# Patient Record
Sex: FEMALE | Race: OTHER | URBAN - METROPOLITAN AREA
[De-identification: names, ages, dates, MRNs, and addresses within clinical notes are randomized per-mention and may not be internally consistent; named-entity substitution may affect disease eponyms.]

---

## 2017-12-06 ENCOUNTER — EMERGENCY (EMERGENCY)
Facility: HOSPITAL | Age: 37
LOS: 0 days | Discharge: HOME | End: 2017-12-06

## 2017-12-06 DIAGNOSIS — R51 HEADACHE: ICD-10-CM

## 2017-12-06 DIAGNOSIS — I10 ESSENTIAL (PRIMARY) HYPERTENSION: ICD-10-CM

## 2017-12-06 DIAGNOSIS — H92.01 OTALGIA, RIGHT EAR: ICD-10-CM

## 2017-12-06 DIAGNOSIS — R20.2 PARESTHESIA OF SKIN: ICD-10-CM

## 2017-12-06 DIAGNOSIS — R42 DIZZINESS AND GIDDINESS: ICD-10-CM

## 2017-12-06 DIAGNOSIS — Z79.899 OTHER LONG TERM (CURRENT) DRUG THERAPY: ICD-10-CM

## 2017-12-13 ENCOUNTER — APPOINTMENT (OUTPATIENT)
Dept: INTERNAL MEDICINE | Facility: CLINIC | Age: 37
End: 2017-12-13

## 2017-12-13 ENCOUNTER — EMERGENCY (EMERGENCY)
Facility: HOSPITAL | Age: 37
LOS: 0 days | Discharge: HOME | End: 2017-12-13

## 2017-12-13 VITALS
TEMPERATURE: 98.3 F | WEIGHT: 246 LBS | BODY MASS INDEX: 41.49 KG/M2 | HEIGHT: 64.5 IN | SYSTOLIC BLOOD PRESSURE: 164 MMHG | DIASTOLIC BLOOD PRESSURE: 104 MMHG

## 2017-12-13 DIAGNOSIS — J18.9 PNEUMONIA, UNSPECIFIED ORGANISM: ICD-10-CM

## 2017-12-13 DIAGNOSIS — R05 COUGH: ICD-10-CM

## 2017-12-13 DIAGNOSIS — Z79.899 OTHER LONG TERM (CURRENT) DRUG THERAPY: ICD-10-CM

## 2017-12-13 DIAGNOSIS — I10 ESSENTIAL (PRIMARY) HYPERTENSION: ICD-10-CM

## 2017-12-13 RX ADMIN — Medication 4 MG: at 00:00

## 2017-12-14 RX ORDER — ASPIRIN 81 MG/1
81 TABLET, CHEWABLE ORAL
Qty: 0 | Refills: 0 | Status: COMPLETED | OUTPATIENT
Start: 2017-12-13

## 2017-12-20 ENCOUNTER — APPOINTMENT (OUTPATIENT)
Dept: INTERNAL MEDICINE | Facility: CLINIC | Age: 37
End: 2017-12-20

## 2017-12-20 ENCOUNTER — OUTPATIENT (OUTPATIENT)
Dept: OUTPATIENT SERVICES | Facility: HOSPITAL | Age: 37
LOS: 1 days | Discharge: HOME | End: 2017-12-20

## 2017-12-20 VITALS
BODY MASS INDEX: 42 KG/M2 | SYSTOLIC BLOOD PRESSURE: 136 MMHG | WEIGHT: 246 LBS | DIASTOLIC BLOOD PRESSURE: 92 MMHG | HEART RATE: 90 BPM | HEIGHT: 64 IN

## 2018-01-19 ENCOUNTER — EMERGENCY (EMERGENCY)
Facility: HOSPITAL | Age: 38
LOS: 0 days | Discharge: HOME | End: 2018-01-19

## 2018-01-19 DIAGNOSIS — B34.9 VIRAL INFECTION, UNSPECIFIED: ICD-10-CM

## 2018-01-19 DIAGNOSIS — I10 ESSENTIAL (PRIMARY) HYPERTENSION: ICD-10-CM

## 2018-01-19 DIAGNOSIS — J02.9 ACUTE PHARYNGITIS, UNSPECIFIED: ICD-10-CM

## 2018-01-19 DIAGNOSIS — Z79.899 OTHER LONG TERM (CURRENT) DRUG THERAPY: ICD-10-CM

## 2018-01-24 ENCOUNTER — APPOINTMENT (OUTPATIENT)
Dept: INTERNAL MEDICINE | Facility: CLINIC | Age: 38
End: 2018-01-24

## 2018-07-02 ENCOUNTER — OUTPATIENT (OUTPATIENT)
Dept: OUTPATIENT SERVICES | Facility: HOSPITAL | Age: 38
LOS: 1 days | Discharge: HOME | End: 2018-07-02

## 2018-07-02 ENCOUNTER — APPOINTMENT (OUTPATIENT)
Dept: OBGYN | Facility: CLINIC | Age: 38
End: 2018-07-02

## 2018-07-02 ENCOUNTER — NON-APPOINTMENT (OUTPATIENT)
Age: 38
End: 2018-07-02

## 2018-07-02 VITALS
DIASTOLIC BLOOD PRESSURE: 88 MMHG | WEIGHT: 248 LBS | SYSTOLIC BLOOD PRESSURE: 138 MMHG | HEIGHT: 64 IN | BODY MASS INDEX: 42.34 KG/M2

## 2018-07-02 DIAGNOSIS — Z87.898 PERSONAL HISTORY OF OTHER SPECIFIED CONDITIONS: ICD-10-CM

## 2018-07-02 DIAGNOSIS — J18.9 PNEUMONIA, UNSPECIFIED ORGANISM: ICD-10-CM

## 2018-07-03 DIAGNOSIS — Z3A.10 10 WEEKS GESTATION OF PREGNANCY: ICD-10-CM

## 2018-07-05 LAB
C TRACH RRNA SPEC QL NAA+PROBE: NOT DETECTED
N GONORRHOEA RRNA SPEC QL NAA+PROBE: NOT DETECTED
SOURCE AMPLIFICATION: NORMAL

## 2018-07-06 ENCOUNTER — APPOINTMENT (OUTPATIENT)
Dept: OBGYN | Facility: CLINIC | Age: 38
End: 2018-07-06

## 2018-07-06 ENCOUNTER — LABORATORY RESULT (OUTPATIENT)
Age: 38
End: 2018-07-06

## 2018-07-11 ENCOUNTER — MESSAGE (OUTPATIENT)
Age: 38
End: 2018-07-11

## 2018-07-11 ENCOUNTER — OTHER (OUTPATIENT)
Age: 38
End: 2018-07-11

## 2018-07-11 LAB
BILIRUB UR QL STRIP: NORMAL
CLARITY UR: CLEAR
COLLECTION METHOD: NORMAL
GLUCOSE UR-MCNC: NEGATIVE
HCG UR QL: 0.2 EU/DL
HGB UR QL STRIP.AUTO: NEGATIVE
KETONES UR-MCNC: NEGATIVE
LEUKOCYTE ESTERASE UR QL STRIP: NEGATIVE
NITRITE UR QL STRIP: NEGATIVE
PH UR STRIP: 6.5
PROT UR STRIP-MCNC: 1
SP GR UR STRIP: 1.01

## 2018-07-19 ENCOUNTER — RESULT REVIEW (OUTPATIENT)
Age: 38
End: 2018-07-19

## 2018-07-19 LAB
ABO + RH PNL BLD: NORMAL
AR GENE MUT ANL BLD/T: POSITIVE
BACTERIA UR CULT: ABNORMAL
BASOPHILS # BLD AUTO: 0.04 K/UL
BASOPHILS NFR BLD AUTO: 0.5 %
BLD GP AB SCN SERPL QL: NORMAL
CFTR MUT TESTED BLD/T: NEGATIVE
EOSINOPHIL # BLD AUTO: 0.14 K/UL
EOSINOPHIL NFR BLD AUTO: 1.6 %
HBV SURFACE AG SER QL: NONREACTIVE
HCT VFR BLD CALC: 40.1 %
HGB A MFR BLD: 97.5 %
HGB A2 MFR BLD: 2.5 %
HGB BLD-MCNC: 12.2 G/DL
HGB FRACT BLD-IMP: NORMAL
HIV1+2 AB SPEC QL IA.RAPID: NONREACTIVE
HPV HIGH+LOW RISK DNA PNL CVX: NOT DETECTED
IMM GRANULOCYTES NFR BLD AUTO: 0.3 %
LEAD BLD-MCNC: <1 UG/DL
LYMPHOCYTES # BLD AUTO: 3.42 K/UL
LYMPHOCYTES NFR BLD AUTO: 38.8 %
MAN DIFF?: NORMAL
MCHC RBC-ENTMCNC: 24.5 PG
MCHC RBC-ENTMCNC: 30.4 G/DL
MCV RBC AUTO: 80.5 FL
MONOCYTES # BLD AUTO: 0.46 K/UL
MONOCYTES NFR BLD AUTO: 5.2 %
NEUTROPHILS # BLD AUTO: 4.73 K/UL
NEUTROPHILS NFR BLD AUTO: 53.6 %
PLATELET # BLD AUTO: 321 K/UL
RBC # BLD: 4.98 M/UL
RBC # FLD: 17.1 %
RUBV IGG FLD-ACNC: 2.3 INDEX
RUBV IGG SER-IMP: POSITIVE
T PALLIDUM AB SER QL IA: NEGATIVE
VZV AB TITR SER: POSITIVE
VZV IGG SER IF-ACNC: >4000 INDEX
WBC # FLD AUTO: 8.82 K/UL

## 2018-07-20 ENCOUNTER — APPOINTMENT (OUTPATIENT)
Dept: OBGYN | Facility: CLINIC | Age: 38
End: 2018-07-20

## 2018-07-23 ENCOUNTER — OUTPATIENT (OUTPATIENT)
Dept: OUTPATIENT SERVICES | Facility: HOSPITAL | Age: 38
LOS: 1 days | Discharge: HOME | End: 2018-07-23

## 2018-07-23 ENCOUNTER — APPOINTMENT (OUTPATIENT)
Dept: ANTEPARTUM | Facility: CLINIC | Age: 38
End: 2018-07-23

## 2018-07-23 ENCOUNTER — APPOINTMENT (OUTPATIENT)
Dept: OBGYN | Facility: CLINIC | Age: 38
End: 2018-07-23

## 2018-07-23 ENCOUNTER — EMERGENCY (EMERGENCY)
Facility: HOSPITAL | Age: 38
LOS: 0 days | Discharge: HOME | End: 2018-07-23
Attending: EMERGENCY MEDICINE | Admitting: EMERGENCY MEDICINE

## 2018-07-23 VITALS — BODY MASS INDEX: 42.55 KG/M2 | HEIGHT: 64 IN | WEIGHT: 249.25 LBS

## 2018-07-23 VITALS
HEART RATE: 85 BPM | RESPIRATION RATE: 18 BRPM | SYSTOLIC BLOOD PRESSURE: 145 MMHG | OXYGEN SATURATION: 99 % | TEMPERATURE: 98 F | DIASTOLIC BLOOD PRESSURE: 68 MMHG

## 2018-07-23 VITALS
OXYGEN SATURATION: 98 % | RESPIRATION RATE: 18 BRPM | HEART RATE: 89 BPM | TEMPERATURE: 98 F | WEIGHT: 247.58 LBS | DIASTOLIC BLOOD PRESSURE: 65 MMHG | SYSTOLIC BLOOD PRESSURE: 138 MMHG

## 2018-07-23 VITALS — OXYGEN SATURATION: 99 % | TEMPERATURE: 98.1 F | HEART RATE: 68 BPM

## 2018-07-23 DIAGNOSIS — O09.291 SUPERVISION OF PREGNANCY WITH OTHER POOR REPRODUCTIVE OR OBSTETRIC HISTORY, FIRST TRIMESTER: ICD-10-CM

## 2018-07-23 DIAGNOSIS — O13.1 GESTATIONAL [PREGNANCY-INDUCED] HYPERTENSION WITHOUT SIGNIFICANT PROTEINURIA, FIRST TRIMESTER: ICD-10-CM

## 2018-07-23 DIAGNOSIS — R51 HEADACHE: ICD-10-CM

## 2018-07-23 DIAGNOSIS — O10.911 UNSPECIFIED PRE-EXISTING HYPERTENSION COMPLICATING PREGNANCY, FIRST TRIMESTER: ICD-10-CM

## 2018-07-23 DIAGNOSIS — O35.1XX1 MATERNAL CARE FOR (SUSPECTED) CHROMOSOMAL ABNORMALITY IN FETUS, FETUS 1: ICD-10-CM

## 2018-07-23 DIAGNOSIS — Z3A.12 12 WEEKS GESTATION OF PREGNANCY: ICD-10-CM

## 2018-07-23 DIAGNOSIS — I10 ESSENTIAL (PRIMARY) HYPERTENSION: ICD-10-CM

## 2018-07-23 DIAGNOSIS — O09.521 SUPERVISION OF ELDERLY MULTIGRAVIDA, FIRST TRIMESTER: ICD-10-CM

## 2018-07-23 DIAGNOSIS — Z36.82 ENCOUNTER FOR ANTENATAL SCREENING FOR NUCHAL TRANSLUCENCY: ICD-10-CM

## 2018-07-23 LAB
ALBUMIN SERPL ELPH-MCNC: 4.4 G/DL — SIGNIFICANT CHANGE UP (ref 3.5–5.2)
ALP SERPL-CCNC: 78 U/L — SIGNIFICANT CHANGE UP (ref 30–115)
ALT FLD-CCNC: 11 U/L — SIGNIFICANT CHANGE UP (ref 0–41)
ANION GAP SERPL CALC-SCNC: 18 MMOL/L — HIGH (ref 7–14)
AST SERPL-CCNC: 14 U/L — SIGNIFICANT CHANGE UP (ref 0–41)
BASOPHILS # BLD AUTO: 0.04 K/UL — SIGNIFICANT CHANGE UP (ref 0–0.2)
BASOPHILS NFR BLD AUTO: 0.4 % — SIGNIFICANT CHANGE UP (ref 0–1)
BILIRUB SERPL-MCNC: 0.2 MG/DL — SIGNIFICANT CHANGE UP (ref 0.2–1.2)
BILIRUB UR QL STRIP: NEGATIVE
BP DIAS: 90 MM HG
BP SYS: 194 MM HG
BUN SERPL-MCNC: 7 MG/DL — LOW (ref 10–20)
CALCIUM SERPL-MCNC: 9.5 MG/DL — SIGNIFICANT CHANGE UP (ref 8.5–10.1)
CHLORIDE SERPL-SCNC: 100 MMOL/L — SIGNIFICANT CHANGE UP (ref 98–110)
CLARITY UR: CLEAR
CO2 SERPL-SCNC: 21 MMOL/L — SIGNIFICANT CHANGE UP (ref 17–32)
COLLECTION METHOD: NORMAL
CREAT SERPL-MCNC: 0.5 MG/DL — LOW (ref 0.7–1.5)
EOSINOPHIL # BLD AUTO: 0.09 K/UL — SIGNIFICANT CHANGE UP (ref 0–0.7)
EOSINOPHIL NFR BLD AUTO: 0.8 % — SIGNIFICANT CHANGE UP (ref 0–8)
ESTIMATED AVERAGE GLUCOSE: 117 MG/DL — HIGH (ref 68–114)
GLUCOSE SERPL-MCNC: 115 MG/DL — HIGH (ref 70–99)
GLUCOSE UR-MCNC: NEGATIVE
HBA1C BLD-MCNC: 5.7 % — HIGH (ref 4–5.6)
HCG UR QL: 0.2 EU/DL
HCT VFR BLD CALC: 38.5 % — SIGNIFICANT CHANGE UP (ref 37–47)
HGB BLD-MCNC: 12.4 G/DL — SIGNIFICANT CHANGE UP (ref 12–16)
HGB UR QL STRIP.AUTO: NEGATIVE
IMM GRANULOCYTES NFR BLD AUTO: 0.4 % — HIGH (ref 0.1–0.3)
KETONES UR-MCNC: NEGATIVE
LDH SERPL L TO P-CCNC: 173 — SIGNIFICANT CHANGE UP (ref 50–242)
LEUKOCYTE ESTERASE UR QL STRIP: NEGATIVE
LYMPHOCYTES # BLD AUTO: 2.64 K/UL — SIGNIFICANT CHANGE UP (ref 1.2–3.4)
LYMPHOCYTES # BLD AUTO: 24.7 % — SIGNIFICANT CHANGE UP (ref 20.5–51.1)
MCHC RBC-ENTMCNC: 24.8 PG — LOW (ref 27–31)
MCHC RBC-ENTMCNC: 32.2 G/DL — SIGNIFICANT CHANGE UP (ref 32–37)
MCV RBC AUTO: 77.2 FL — LOW (ref 81–99)
MONOCYTES # BLD AUTO: 0.57 K/UL — SIGNIFICANT CHANGE UP (ref 0.1–0.6)
MONOCYTES NFR BLD AUTO: 5.3 % — SIGNIFICANT CHANGE UP (ref 1.7–9.3)
NEUTROPHILS # BLD AUTO: 7.32 K/UL — HIGH (ref 1.4–6.5)
NEUTROPHILS NFR BLD AUTO: 68.4 % — SIGNIFICANT CHANGE UP (ref 42.2–75.2)
NITRITE UR QL STRIP: NEGATIVE
NRBC # BLD: 0 /100 WBCS — SIGNIFICANT CHANGE UP (ref 0–0)
OB COMMENTS: NORMAL
PH UR STRIP: 6
PLATELET # BLD AUTO: 254 K/UL — SIGNIFICANT CHANGE UP (ref 130–400)
POTASSIUM SERPL-MCNC: 3.9 MMOL/L — SIGNIFICANT CHANGE UP (ref 3.5–5)
POTASSIUM SERPL-SCNC: 3.9 MMOL/L — SIGNIFICANT CHANGE UP (ref 3.5–5)
PROT SERPL-MCNC: 7.8 G/DL — SIGNIFICANT CHANGE UP (ref 6–8)
PROT UR STRIP-MCNC: NEGATIVE
RBC # BLD: 4.99 M/UL — SIGNIFICANT CHANGE UP (ref 4.2–5.4)
RBC # FLD: 17.1 % — HIGH (ref 11.5–14.5)
SCHEDULED VISIT: YES
SODIUM SERPL-SCNC: 139 MMOL/L — SIGNIFICANT CHANGE UP (ref 135–146)
SP GR UR STRIP: 1.03
URATE SERPL-MCNC: 3.3 MG/DL — SIGNIFICANT CHANGE UP (ref 2.5–7)
URINE ALBUMIN/PROTEIN: NEGATIVE
URINE GLUCOSE: NEGATIVE
URINE KETONES: NEGATIVE
WBC # BLD: 10.7 K/UL — SIGNIFICANT CHANGE UP (ref 4.8–10.8)
WBC # FLD AUTO: 10.7 K/UL — SIGNIFICANT CHANGE UP (ref 4.8–10.8)
WEEKS GESTATION: NORMAL

## 2018-07-23 RX ORDER — PNV NO.95/FERROUS FUM/FOLIC AC 28MG-0.8MG
TABLET ORAL
Refills: 0 | Status: ACTIVE | COMMUNITY
Start: 2018-07-23

## 2018-07-23 RX ORDER — NIFEDIPINE 30 MG
1 TABLET, EXTENDED RELEASE 24 HR ORAL
Qty: 30 | Refills: 0 | OUTPATIENT
Start: 2018-07-23 | End: 2018-08-21

## 2018-07-23 RX ORDER — ACETAMINOPHEN 500 MG
975 TABLET ORAL ONCE
Qty: 0 | Refills: 0 | Status: COMPLETED | OUTPATIENT
Start: 2018-07-23 | End: 2018-07-23

## 2018-07-23 RX ADMIN — Medication 975 MILLIGRAM(S): at 16:02

## 2018-07-23 NOTE — ED PROVIDER NOTE - PHYSICAL EXAMINATION
Non-toxic in appearance. No pallor, no jaundice. Neck supple, no meningeal signs. Lungs CTA b/l. S1 S2 regular, no murmur. ABD soft, no tenderness. 1+ pedal edema b/l (chronic as per patient), no calf tenderness. No skin rash. Neuro: A&O x 3, CN II-XII intact, strength 5/5 b/l, sensation intact and equal, finger-to-nose intact, negative Rhomberg, normal gait.     Imp: BP improved without meds. H/A likely tension. No sign of SAH or meningitis.  A/P: Acetaminophen for H/A. will order labs requested by GYN clinic. Will repeat BP. Will need referral to medicine clinic for BP management.

## 2018-07-23 NOTE — ED PROVIDER NOTE - OBJECTIVE STATEMENT
39 y/o female at 12 wks gestation, with pregestational HTN, but has been off meds x 7 months. Today went for prenatal visit and found to be hypertensive at 190/90. Mild gradual-onset left-sided headache, similar to previous headaches. No neck pain. No photophobia. No N/V. No ABD pain. No vaginal bleeding. (History obtained through  #[id number].)

## 2018-07-23 NOTE — ED PROVIDER NOTE - MEDICAL DECISION MAKING DETAILS
/90. No end organ symptoms. Labs reviewed. Will start Nifedipine. Will refer to rapid referral medicine clinic 7/27/18 @ 12:45 PM. Will also f/u with GYN clinic.

## 2018-07-23 NOTE — ED ADULT NURSE NOTE - OBJECTIVE STATEMENT
pt is 12 weeks pregnant was sent from the OB/GYN office for elevated BP at the office. pt is asymptomatic denies pain or discomfort at this time.

## 2018-07-27 ENCOUNTER — APPOINTMENT (OUTPATIENT)
Dept: INTERNAL MEDICINE | Facility: CLINIC | Age: 38
End: 2018-07-27

## 2018-07-27 ENCOUNTER — OUTPATIENT (OUTPATIENT)
Dept: OUTPATIENT SERVICES | Facility: HOSPITAL | Age: 38
LOS: 1 days | Discharge: HOME | End: 2018-07-27

## 2018-07-27 VITALS
HEART RATE: 71 BPM | WEIGHT: 245 LBS | DIASTOLIC BLOOD PRESSURE: 83 MMHG | BODY MASS INDEX: 41.83 KG/M2 | SYSTOLIC BLOOD PRESSURE: 183 MMHG | HEIGHT: 64 IN | TEMPERATURE: 98.5 F

## 2018-07-27 DIAGNOSIS — Z00.00 ENCOUNTER FOR GENERAL ADULT MEDICAL EXAMINATION W/OUT ABNORMAL FINDINGS: ICD-10-CM

## 2018-07-27 NOTE — REVIEW OF SYSTEMS
[Fever] : no fever [Chills] : no chills [Discharge] : no discharge [Pain] : no pain [Chest Pain] : no chest pain [Palpitations] : no palpitations [Orthopnea] : no orthopnea [Shortness Of Breath] : no shortness of breath [Wheezing] : no wheezing [Abdominal Pain] : no abdominal pain [Nausea] : no nausea [Vomiting] : no vomiting [Joint Pain] : no joint pain [Muscle Pain] : no muscle pain [Headache] : no headache [Dizziness] : no dizziness [Memory Loss] : no memory loss

## 2018-07-27 NOTE — ASSESSMENT
[Obese (BMI >29.9)] : Obese - BMI >29.9 [FreeTextEntry1] : 38 y/o F with a PMH of HTN presents to the clinic\par \par # Positive Spinal Muscular Atrophy on Assay\par - follow up with sunitha/gyn\par \par # HTN, 2/2 to medication noncompliance\par - bp is 183/83\par - prescribed methyldopa 250mg x3 daily \par - f/u with sunitha/gyn\par \par # HCM: \par - Pap up to date\par - Mammo next year\par - rto in 1 month\par

## 2018-07-27 NOTE — HISTORY OF PRESENT ILLNESS
[FreeTextEntry1] : 36 y/o F with a PMH of HTN presents to the clinic for evaluation of high blood pressure.  Patient reports that she was given nifedipine 60mg, however the nifedipine when taken gives her headaches.  The patient is asking for alternatives to nifedipine as she does not want to take this medication anymore.  No dizziness, no palpitations, no recent positives on review of systems other then ones listed.

## 2018-07-27 NOTE — PHYSICAL EXAM
[No Acute Distress] : no acute distress [Well Nourished] : well nourished [No JVD] : no jugular venous distention [No Respiratory Distress] : no respiratory distress  [Clear to Auscultation] : lungs were clear to auscultation bilaterally [Normal Rate] : normal rate  [Regular Rhythm] : with a regular rhythm [Soft] : abdomen soft [Non Tender] : non-tender [No HSM] : no HSM [No Joint Swelling] : no joint swelling [Normal Gait] : normal gait

## 2018-07-31 ENCOUNTER — APPOINTMENT (OUTPATIENT)
Dept: ANTEPARTUM | Facility: CLINIC | Age: 38
End: 2018-07-31

## 2018-07-31 ENCOUNTER — OUTPATIENT (OUTPATIENT)
Dept: OUTPATIENT SERVICES | Facility: HOSPITAL | Age: 38
LOS: 1 days | Discharge: HOME | End: 2018-07-31

## 2018-07-31 VITALS — WEIGHT: 249.19 LBS | BODY MASS INDEX: 42.77 KG/M2

## 2018-07-31 LAB
BILIRUB UR QL STRIP: NEGATIVE
BP DIAS: 84 MM HG
BP SYS: 176 MM HG
CLARITY UR: CLEAR
COLLECTION METHOD: NORMAL
FETAL HEART RATE (BPM): 151
GLUCOSE UR-MCNC: NEGATIVE
HCG UR QL: 0.2 EU/DL
HGB UR QL STRIP.AUTO: NEGATIVE
KETONES UR-MCNC: NEGATIVE
LEUKOCYTE ESTERASE UR QL STRIP: NEGATIVE
NITRITE UR QL STRIP: NEGATIVE
OB COMMENTS: NORMAL
PH UR STRIP: 6
PROT UR STRIP-MCNC: NEGATIVE
SCHEDULED VISIT: YES
SP GR UR STRIP: 1
URINE ALBUMIN/PROTEIN: NEGATIVE
URINE GLUCOSE: NEGATIVE
URINE KETONES: NORMAL
WEEKS GESTATION: NORMAL

## 2018-07-31 RX ORDER — ASPIRIN ENTERIC COATED TABLETS 81 MG 81 MG/1
81 TABLET, DELAYED RELEASE ORAL DAILY
Qty: 30 | Refills: 6 | Status: ACTIVE | COMMUNITY
Start: 2018-07-31 | End: 1900-01-01

## 2018-08-03 ENCOUNTER — LABORATORY RESULT (OUTPATIENT)
Age: 38
End: 2018-08-03

## 2018-08-03 DIAGNOSIS — O09.291 SUPERVISION OF PREGNANCY WITH OTHER POOR REPRODUCTIVE OR OBSTETRIC HISTORY, FIRST TRIMESTER: ICD-10-CM

## 2018-08-03 DIAGNOSIS — E66.9 OBESITY, UNSPECIFIED: ICD-10-CM

## 2018-08-03 DIAGNOSIS — O46.90 ANTEPARTUM HEMORRHAGE, UNSPECIFIED, UNSPECIFIED TRIMESTER: ICD-10-CM

## 2018-08-03 DIAGNOSIS — O10.911 UNSPECIFIED PRE-EXISTING HYPERTENSION COMPLICATING PREGNANCY, FIRST TRIMESTER: ICD-10-CM

## 2018-08-03 DIAGNOSIS — O09.521 SUPERVISION OF ELDERLY MULTIGRAVIDA, FIRST TRIMESTER: ICD-10-CM

## 2018-08-06 LAB
ALBUMIN SERPL ELPH-MCNC: 4.3 G/DL
ALP BLD-CCNC: 81 U/L
ALT SERPL-CCNC: 9 U/L
ANION GAP SERPL CALC-SCNC: 17 MMOL/L
AST SERPL-CCNC: 12 U/L
BASOPHILS # BLD AUTO: 0.03 K/UL
BASOPHILS NFR BLD AUTO: 0.3 %
BILIRUB SERPL-MCNC: 0.3 MG/DL
BUN SERPL-MCNC: 7 MG/DL
CALCIUM SERPL-MCNC: 9.8 MG/DL
CHLORIDE SERPL-SCNC: 98 MMOL/L
CO2 SERPL-SCNC: 22 MMOL/L
CREAT 24H UR-MCNC: 0.5 G/24 HR
CREAT ?TM UR-MCNC: 36 MG/DL
CREAT SERPL-MCNC: 0.6 MG/DL
EOSINOPHIL # BLD AUTO: 0.08 K/UL
EOSINOPHIL NFR BLD AUTO: 0.9 %
GLUCOSE 1H P 50 G GLC PO SERPL-MCNC: 126 MG/DL
GLUCOSE SERPL-MCNC: 118 MG/DL
HCT VFR BLD CALC: 40.1 %
HGB BLD-MCNC: 12.6 G/DL
IMM GRANULOCYTES NFR BLD AUTO: 0.2 %
LYMPHOCYTES # BLD AUTO: 2.66 K/UL
LYMPHOCYTES NFR BLD AUTO: 29.1 %
MAN DIFF?: NORMAL
MCHC RBC-ENTMCNC: 25.3 PG
MCHC RBC-ENTMCNC: 31.4 G/DL
MCV RBC AUTO: 80.4 FL
MONOCYTES # BLD AUTO: 0.59 K/UL
MONOCYTES NFR BLD AUTO: 6.4 %
NEUTROPHILS # BLD AUTO: 5.77 K/UL
NEUTROPHILS NFR BLD AUTO: 63.1 %
PLATELET # BLD AUTO: 286 K/UL
POTASSIUM SERPL-SCNC: 3.8 MMOL/L
PROT 24H UR-MRATE: 18 MG/DL
PROT ?TM UR-MCNC: 24 HR
PROT SERPL-MCNC: 7.5 G/DL
PROT UR-MCNC: 252 MG/24 H
RBC # BLD: 4.99 M/UL
RBC # FLD: 18.2 %
SODIUM SERPL-SCNC: 137 MMOL/L
SPECIMEN VOL 24H UR: 1400 ML
WBC # FLD AUTO: 9.15 K/UL

## 2018-08-07 ENCOUNTER — OUTPATIENT (OUTPATIENT)
Dept: OUTPATIENT SERVICES | Facility: HOSPITAL | Age: 38
LOS: 1 days | Discharge: HOME | End: 2018-08-07

## 2018-08-07 ENCOUNTER — APPOINTMENT (OUTPATIENT)
Dept: ANTEPARTUM | Facility: CLINIC | Age: 38
End: 2018-08-07

## 2018-08-07 VITALS
SYSTOLIC BLOOD PRESSURE: 184 MMHG | HEART RATE: 83 BPM | OXYGEN SATURATION: 99 % | DIASTOLIC BLOOD PRESSURE: 86 MMHG | WEIGHT: 246 LBS | BODY MASS INDEX: 42.23 KG/M2 | TEMPERATURE: 98.5 F

## 2018-08-07 DIAGNOSIS — I10 ESSENTIAL (PRIMARY) HYPERTENSION: ICD-10-CM

## 2018-08-07 LAB
BILIRUB UR QL STRIP: NEGATIVE
CLARITY UR: CLEAR
COLLECTION METHOD: NORMAL
FETAL HEART RATE (BPM): 162
GLUCOSE UR-MCNC: NEGATIVE
HCG UR QL: 0.2 EU/DL
HGB UR QL STRIP.AUTO: NEGATIVE
KETONES UR-MCNC: NEGATIVE
LEUKOCYTE ESTERASE UR QL STRIP: NEGATIVE
NITRITE UR QL STRIP: NEGATIVE
OB COMMENTS: NORMAL
PH UR STRIP: 6
PROT UR STRIP-MCNC: NEGATIVE
SCHEDULED VISIT: YES
SP GR UR STRIP: 1.02
URINE ALBUMIN/PROTEIN: NEGATIVE
URINE GLUCOSE: NEGATIVE
URINE KETONES: NEGATIVE
WEEKS GESTATION: NORMAL

## 2018-08-08 DIAGNOSIS — O10.011 PRE-EXISTING ESSENTIAL HYPERTENSION COMPLICATING PREGNANCY, FIRST TRIMESTER: ICD-10-CM

## 2018-08-08 DIAGNOSIS — E66.9 OBESITY, UNSPECIFIED: ICD-10-CM

## 2018-08-08 DIAGNOSIS — O09.512 SUPERVISION OF ELDERLY PRIMIGRAVIDA, SECOND TRIMESTER: ICD-10-CM

## 2018-08-08 DIAGNOSIS — O09.292 SUPERVISION OF PREGNANCY WITH OTHER POOR REPRODUCTIVE OR OBSTETRIC HISTORY, SECOND TRIMESTER: ICD-10-CM

## 2018-08-14 ENCOUNTER — OUTPATIENT (OUTPATIENT)
Dept: OUTPATIENT SERVICES | Facility: HOSPITAL | Age: 38
LOS: 1 days | Discharge: HOME | End: 2018-08-14

## 2018-08-14 ENCOUNTER — APPOINTMENT (OUTPATIENT)
Dept: ANTEPARTUM | Facility: CLINIC | Age: 38
End: 2018-08-14

## 2018-08-14 ENCOUNTER — APPOINTMENT (OUTPATIENT)
Dept: OBGYN | Facility: CLINIC | Age: 38
End: 2018-08-14

## 2018-08-14 VITALS
TEMPERATURE: 97.9 F | OXYGEN SATURATION: 100 % | WEIGHT: 251 LBS | DIASTOLIC BLOOD PRESSURE: 93 MMHG | SYSTOLIC BLOOD PRESSURE: 160 MMHG | HEART RATE: 84 BPM | BODY MASS INDEX: 43.08 KG/M2

## 2018-08-14 LAB
BILIRUB UR QL STRIP: NEGATIVE
CLARITY UR: CLEAR
COLLECTION METHOD: NORMAL
FETAL HEART DESCRIPTION: NORMAL
FETAL HEART RATE (BPM): 158
GLUCOSE UR-MCNC: NEGATIVE
HCG UR QL: 0.2 EU/DL
HGB UR QL STRIP.AUTO: NEGATIVE
KETONES UR-MCNC: NEGATIVE
LEUKOCYTE ESTERASE UR QL STRIP: NEGATIVE
NITRITE UR QL STRIP: NEGATIVE
PH UR STRIP: 6.5
PROT UR STRIP-MCNC: NEGATIVE
SCHEDULED VISIT: YES
SP GR UR STRIP: 1
URINE ALBUMIN/PROTEIN: NEGATIVE
URINE GLUCOSE: NEGATIVE
URINE KETONES: NEGATIVE
WEEKS GESTATION: NORMAL

## 2018-08-16 DIAGNOSIS — O99.512 DISEASES OF THE RESPIRATORY SYSTEM COMPLICATING PREGNANCY, SECOND TRIMESTER: ICD-10-CM

## 2018-08-16 DIAGNOSIS — O09.292 SUPERVISION OF PREGNANCY WITH OTHER POOR REPRODUCTIVE OR OBSTETRIC HISTORY, SECOND TRIMESTER: ICD-10-CM

## 2018-08-16 DIAGNOSIS — O10.012 PRE-EXISTING ESSENTIAL HYPERTENSION COMPLICATING PREGNANCY, SECOND TRIMESTER: ICD-10-CM

## 2018-08-16 DIAGNOSIS — E66.9 OBESITY, UNSPECIFIED: ICD-10-CM

## 2018-08-28 ENCOUNTER — APPOINTMENT (OUTPATIENT)
Dept: ANTEPARTUM | Facility: CLINIC | Age: 38
End: 2018-08-28

## 2018-08-28 ENCOUNTER — OUTPATIENT (OUTPATIENT)
Dept: OUTPATIENT SERVICES | Facility: HOSPITAL | Age: 38
LOS: 1 days | Discharge: HOME | End: 2018-08-28

## 2018-08-28 ENCOUNTER — APPOINTMENT (OUTPATIENT)
Dept: OBGYN | Facility: CLINIC | Age: 38
End: 2018-08-28

## 2018-08-28 VITALS
HEART RATE: 82 BPM | OXYGEN SATURATION: 98 % | TEMPERATURE: 97.7 F | WEIGHT: 251 LBS | BODY MASS INDEX: 43.08 KG/M2 | DIASTOLIC BLOOD PRESSURE: 69 MMHG | SYSTOLIC BLOOD PRESSURE: 160 MMHG

## 2018-08-28 LAB
BILIRUB UR QL STRIP: NEGATIVE
CLARITY UR: CLEAR
COLLECTION METHOD: NORMAL
FETAL HEART DESCRIPTION: NORMAL
FETAL HEART RATE (BPM): 154
GLUCOSE UR-MCNC: NEGATIVE
HCG UR QL: 0.2 EU/DL
HGB UR QL STRIP.AUTO: NEGATIVE
KETONES UR-MCNC: NEGATIVE
LEUKOCYTE ESTERASE UR QL STRIP: NEGATIVE
NITRITE UR QL STRIP: NEGATIVE
OB COMMENTS: NORMAL
PH UR STRIP: 8
PROT UR STRIP-MCNC: NEGATIVE
SCHEDULED VISIT: YES
SP GR UR STRIP: 1
URINE ALBUMIN/PROTEIN: NEGATIVE
URINE GLUCOSE: NEGATIVE
URINE KETONES: NEGATIVE
WEEKS GESTATION: NORMAL

## 2018-08-30 DIAGNOSIS — O09.512 SUPERVISION OF ELDERLY PRIMIGRAVIDA, SECOND TRIMESTER: ICD-10-CM

## 2018-08-30 DIAGNOSIS — O09.292 SUPERVISION OF PREGNANCY WITH OTHER POOR REPRODUCTIVE OR OBSTETRIC HISTORY, SECOND TRIMESTER: ICD-10-CM

## 2018-08-30 DIAGNOSIS — E66.9 OBESITY, UNSPECIFIED: ICD-10-CM

## 2018-08-30 DIAGNOSIS — O10.012 PRE-EXISTING ESSENTIAL HYPERTENSION COMPLICATING PREGNANCY, SECOND TRIMESTER: ICD-10-CM

## 2018-09-11 ENCOUNTER — OUTPATIENT (OUTPATIENT)
Dept: OUTPATIENT SERVICES | Facility: HOSPITAL | Age: 38
LOS: 1 days | Discharge: HOME | End: 2018-09-11

## 2018-09-11 ENCOUNTER — APPOINTMENT (OUTPATIENT)
Dept: ANTEPARTUM | Facility: CLINIC | Age: 38
End: 2018-09-11

## 2018-09-11 VITALS
HEART RATE: 89 BPM | DIASTOLIC BLOOD PRESSURE: 76 MMHG | SYSTOLIC BLOOD PRESSURE: 150 MMHG | WEIGHT: 254 LBS | BODY MASS INDEX: 43.6 KG/M2 | OXYGEN SATURATION: 98 % | TEMPERATURE: 97.9 F

## 2018-09-11 DIAGNOSIS — O09.92 SUPERVISION OF HIGH RISK PREGNANCY, UNSPECIFIED, SECOND TRIMESTER: ICD-10-CM

## 2018-09-11 DIAGNOSIS — Z3A.19 19 WEEKS GESTATION OF PREGNANCY: ICD-10-CM

## 2018-09-11 DIAGNOSIS — Z87.59 PERSONAL HISTORY OF OTHER COMPLICATIONS OF PREGNANCY, CHILDBIRTH AND THE PUERPERIUM: ICD-10-CM

## 2018-09-11 DIAGNOSIS — Z80.0 FAMILY HISTORY OF MALIGNANT NEOPLASM OF DIGESTIVE ORGANS: ICD-10-CM

## 2018-09-11 DIAGNOSIS — O99.212 OBESITY COMPLICATING PREGNANCY, SECOND TRIMESTER: ICD-10-CM

## 2018-09-11 DIAGNOSIS — O10.012 PRE-EXISTING ESSENTIAL HYPERTENSION COMPLICATING PREGNANCY, SECOND TRIMESTER: ICD-10-CM

## 2018-09-11 DIAGNOSIS — Z82.49 FAMILY HISTORY OF ISCHEMIC HEART DISEASE AND OTHER DISEASES OF THE CIRCULATORY SYSTEM: ICD-10-CM

## 2018-09-11 DIAGNOSIS — I10 ESSENTIAL (PRIMARY) HYPERTENSION: ICD-10-CM

## 2018-09-11 DIAGNOSIS — O09.292 SUPERVISION OF PREGNANCY WITH OTHER POOR REPRODUCTIVE OR OBSTETRIC HISTORY, SECOND TRIMESTER: ICD-10-CM

## 2018-09-11 DIAGNOSIS — O09.512 SUPERVISION OF ELDERLY PRIMIGRAVIDA, SECOND TRIMESTER: ICD-10-CM

## 2018-09-11 DIAGNOSIS — Z87.898 PERSONAL HISTORY OF OTHER SPECIFIED CONDITIONS: ICD-10-CM

## 2018-09-11 LAB
FETAL HEART DESCRIPTION: NORMAL
FETAL HEART RATE (BPM): 158
SCHEDULED VISIT: YES
URINE ALBUMIN/PROTEIN: NEGATIVE
URINE GLUCOSE: NEGATIVE
URINE KETONES: NEGATIVE
WEEKS GESTATION: NORMAL

## 2018-09-11 RX ORDER — NITROFURANTOIN (MONOHYDRATE/MACROCRYSTALS) 25; 75 MG/1; MG/1
100 CAPSULE ORAL
Qty: 14 | Refills: 0 | Status: DISCONTINUED | COMMUNITY
Start: 2018-07-11 | End: 2018-09-11

## 2018-09-11 RX ORDER — AMLODIPINE BESYLATE 10 MG/1
10 TABLET ORAL DAILY
Qty: 30 | Refills: 3 | Status: DISCONTINUED | COMMUNITY
End: 2018-09-11

## 2018-09-17 ENCOUNTER — APPOINTMENT (OUTPATIENT)
Dept: ANTEPARTUM | Facility: CLINIC | Age: 38
End: 2018-09-17

## 2018-09-17 ENCOUNTER — OUTPATIENT (OUTPATIENT)
Dept: OUTPATIENT SERVICES | Facility: HOSPITAL | Age: 38
LOS: 1 days | Discharge: HOME | End: 2018-09-17

## 2018-09-25 ENCOUNTER — OUTPATIENT (OUTPATIENT)
Dept: OUTPATIENT SERVICES | Facility: HOSPITAL | Age: 38
LOS: 1 days | Discharge: HOME | End: 2018-09-25

## 2018-09-25 ENCOUNTER — APPOINTMENT (OUTPATIENT)
Dept: ANTEPARTUM | Facility: CLINIC | Age: 38
End: 2018-09-25

## 2018-09-25 VITALS
DIASTOLIC BLOOD PRESSURE: 74 MMHG | WEIGHT: 256 LBS | BODY MASS INDEX: 43.94 KG/M2 | HEART RATE: 96 BPM | OXYGEN SATURATION: 99 % | SYSTOLIC BLOOD PRESSURE: 142 MMHG | TEMPERATURE: 98.2 F

## 2018-09-25 DIAGNOSIS — Z3A.21 21 WEEKS GESTATION OF PREGNANCY: ICD-10-CM

## 2018-09-25 DIAGNOSIS — O10.012 PRE-EXISTING ESSENTIAL HYPERTENSION COMPLICATING PREGNANCY, SECOND TRIMESTER: ICD-10-CM

## 2018-09-25 DIAGNOSIS — O09.92 SUPERVISION OF HIGH RISK PREGNANCY, UNSPECIFIED, SECOND TRIMESTER: ICD-10-CM

## 2018-09-25 DIAGNOSIS — O99.212 OBESITY COMPLICATING PREGNANCY, SECOND TRIMESTER: ICD-10-CM

## 2018-09-25 DIAGNOSIS — Z87.01 PERSONAL HISTORY OF PNEUMONIA (RECURRENT): ICD-10-CM

## 2018-09-25 DIAGNOSIS — Z34.90 ENCOUNTER FOR SUPERVISION OF NORMAL PREGNANCY, UNSPECIFIED, UNSPECIFIED TRIMESTER: ICD-10-CM

## 2018-09-25 DIAGNOSIS — O09.512 SUPERVISION OF ELDERLY PRIMIGRAVIDA, SECOND TRIMESTER: ICD-10-CM

## 2018-09-25 LAB
BILIRUB UR QL STRIP: NORMAL
CLARITY UR: CLEAR
COLLECTION METHOD: NORMAL
FETAL HEART DESCRIPTION: NORMAL
FETAL HEART RATE (BPM): 152
FETAL MOVEMENT: PRESENT
GLUCOSE UR-MCNC: NEGATIVE
HCG UR QL: 0.2 EU/DL
HGB UR QL STRIP.AUTO: NEGATIVE
KETONES UR-MCNC: NEGATIVE
LEUKOCYTE ESTERASE UR QL STRIP: NEGATIVE
NITRITE UR QL STRIP: NEGATIVE
PH UR STRIP: 6
PROT UR STRIP-MCNC: NEGATIVE
SCHEDULED VISIT: YES
SP GR UR STRIP: 1.01
URINE ALBUMIN/PROTEIN: NEGATIVE
URINE GLUCOSE: NEGATIVE
URINE KETONES: NEGATIVE
WEEKS GESTATION: NORMAL

## 2018-10-09 ENCOUNTER — APPOINTMENT (OUTPATIENT)
Dept: ANTEPARTUM | Facility: CLINIC | Age: 38
End: 2018-10-09

## 2018-10-09 ENCOUNTER — OUTPATIENT (OUTPATIENT)
Dept: OUTPATIENT SERVICES | Facility: HOSPITAL | Age: 38
LOS: 1 days | Discharge: HOME | End: 2018-10-09

## 2018-10-09 VITALS
WEIGHT: 256 LBS | SYSTOLIC BLOOD PRESSURE: 120 MMHG | BODY MASS INDEX: 43.94 KG/M2 | TEMPERATURE: 98.4 F | HEART RATE: 90 BPM | DIASTOLIC BLOOD PRESSURE: 72 MMHG | OXYGEN SATURATION: 96 %

## 2018-10-09 DIAGNOSIS — O09.292 SUPERVISION OF PREGNANCY WITH OTHER POOR REPRODUCTIVE OR OBSTETRIC HISTORY, SECOND TRIMESTER: ICD-10-CM

## 2018-10-09 DIAGNOSIS — O10.012 PRE-EXISTING ESSENTIAL HYPERTENSION COMPLICATING PREGNANCY, SECOND TRIMESTER: ICD-10-CM

## 2018-10-09 LAB
BILIRUB UR QL STRIP: NORMAL
CLARITY UR: CLEAR
COLLECTION METHOD: NORMAL
GLUCOSE UR-MCNC: NORMAL
HCG UR QL: 0.2 EU/DL
HGB UR QL STRIP.AUTO: NORMAL
KETONES UR-MCNC: NORMAL
LEUKOCYTE ESTERASE UR QL STRIP: NORMAL
NITRITE UR QL STRIP: NORMAL
PH UR STRIP: 6
PROT UR STRIP-MCNC: NORMAL
SP GR UR STRIP: 1.01

## 2018-10-25 ENCOUNTER — RESULT CHARGE (OUTPATIENT)
Age: 38
End: 2018-10-25

## 2018-10-25 ENCOUNTER — APPOINTMENT (OUTPATIENT)
Dept: ANTEPARTUM | Facility: CLINIC | Age: 38
End: 2018-10-25

## 2018-10-25 ENCOUNTER — OUTPATIENT (OUTPATIENT)
Dept: OUTPATIENT SERVICES | Facility: HOSPITAL | Age: 38
LOS: 1 days | Discharge: HOME | End: 2018-10-25

## 2018-10-25 VITALS
HEART RATE: 82 BPM | WEIGHT: 262 LBS | SYSTOLIC BLOOD PRESSURE: 136 MMHG | DIASTOLIC BLOOD PRESSURE: 77 MMHG | OXYGEN SATURATION: 99 % | BODY MASS INDEX: 44.97 KG/M2

## 2018-10-25 DIAGNOSIS — O09.292 SUPERVISION OF PREGNANCY WITH OTHER POOR REPRODUCTIVE OR OBSTETRIC HISTORY, SECOND TRIMESTER: ICD-10-CM

## 2018-10-25 DIAGNOSIS — O36.5990 MATERNAL CARE FOR OTHER KNOWN OR SUSPECTED POOR FETAL GROWTH, UNSPECIFIED TRIMESTER, NOT APPLICABLE OR UNSPECIFIED: ICD-10-CM

## 2018-10-25 LAB
BILIRUB UR QL STRIP: NORMAL
CLARITY UR: CLEAR
COLLECTION METHOD: NORMAL
FETAL MOVEMENT: PRESENT
GLUCOSE UR-MCNC: NORMAL
HCG UR QL: 0.2 EU/DL
HGB UR QL STRIP.AUTO: NORMAL
KETONES UR-MCNC: NORMAL
LEUKOCYTE ESTERASE UR QL STRIP: NORMAL
NITRITE UR QL STRIP: NORMAL
OB COMMENTS: NORMAL
PH UR STRIP: 6.5
PROT UR STRIP-MCNC: NORMAL
SCHEDULED VISIT: YES
SP GR UR STRIP: 1.01
URINE ALBUMIN/PROTEIN: NORMAL
URINE GLUCOSE: NORMAL
URINE KETONES: NORMAL
WEEKS GESTATION: NORMAL

## 2018-11-08 ENCOUNTER — APPOINTMENT (OUTPATIENT)
Dept: ANTEPARTUM | Facility: CLINIC | Age: 38
End: 2018-11-08

## 2018-11-12 LAB
BASOPHILS # BLD AUTO: 0.02 K/UL
BASOPHILS NFR BLD AUTO: 0.2 %
EOSINOPHIL # BLD AUTO: 0.1 K/UL
EOSINOPHIL NFR BLD AUTO: 0.9 %
GLUCOSE 1H P 50 G GLC PO SERPL-MCNC: 134 MG/DL
HCT VFR BLD CALC: 34.3 %
HGB BLD-MCNC: 11.3 G/DL
IMM GRANULOCYTES NFR BLD AUTO: 0.5 %
LYMPHOCYTES # BLD AUTO: 2.49 K/UL
LYMPHOCYTES NFR BLD AUTO: 22.7 %
MAN DIFF?: NORMAL
MCHC RBC-ENTMCNC: 28.4 PG
MCHC RBC-ENTMCNC: 32.9 G/DL
MCV RBC AUTO: 86.2 FL
MONOCYTES # BLD AUTO: 0.7 K/UL
MONOCYTES NFR BLD AUTO: 6.4 %
NEUTROPHILS # BLD AUTO: 7.62 K/UL
NEUTROPHILS NFR BLD AUTO: 69.3 %
PLATELET # BLD AUTO: 244 K/UL
RBC # BLD: 3.98 M/UL
RBC # FLD: 15.6 %
WBC # FLD AUTO: 10.99 K/UL

## 2018-11-14 ENCOUNTER — RESULT CHARGE (OUTPATIENT)
Age: 38
End: 2018-11-14

## 2018-11-14 ENCOUNTER — OUTPATIENT (OUTPATIENT)
Dept: OUTPATIENT SERVICES | Facility: HOSPITAL | Age: 38
LOS: 1 days | Discharge: HOME | End: 2018-11-14

## 2018-11-14 ENCOUNTER — APPOINTMENT (OUTPATIENT)
Dept: ANTEPARTUM | Facility: CLINIC | Age: 38
End: 2018-11-14

## 2018-11-14 VITALS
TEMPERATURE: 97.7 F | SYSTOLIC BLOOD PRESSURE: 137 MMHG | BODY MASS INDEX: 45.14 KG/M2 | WEIGHT: 263 LBS | OXYGEN SATURATION: 98 % | DIASTOLIC BLOOD PRESSURE: 86 MMHG | HEART RATE: 84 BPM

## 2018-11-14 DIAGNOSIS — I10 ESSENTIAL (PRIMARY) HYPERTENSION: ICD-10-CM

## 2018-11-14 LAB
BILIRUB UR QL STRIP: NEGATIVE
CLARITY UR: CLEAR
COLLECTION METHOD: NORMAL
FETAL HEART DESCRIPTION: NORMAL
FETAL HEART RATE (BPM): 149
FETAL MOVEMENT: PRESENT
GLUCOSE UR-MCNC: NEGATIVE
HCG UR QL: NEGATIVE EU/DL
HGB UR QL STRIP.AUTO: NEGATIVE
KETONES UR-MCNC: NEGATIVE
LEUKOCYTE ESTERASE UR QL STRIP: NEGATIVE
NITRITE UR QL STRIP: NEGATIVE
PH UR STRIP: 7
PROT UR STRIP-MCNC: NEGATIVE
SCHEDULED VISIT: YES
SP GR UR STRIP: 1.01
URINE ALBUMIN/PROTEIN: NEGATIVE
URINE GLUCOSE: NEGATIVE
URINE KETONES: NEGATIVE
WEEKS GESTATION: NORMAL

## 2018-11-14 RX ORDER — METHYLDOPA 250 MG/1
250 TABLET, FILM COATED ORAL 3 TIMES DAILY
Qty: 30 | Refills: 3 | Status: DISCONTINUED | COMMUNITY
Start: 2018-07-27 | End: 2018-11-14

## 2018-11-14 RX ORDER — ASPIRIN ENTERIC COATED TABLETS 81 MG 81 MG/1
81 TABLET, DELAYED RELEASE ORAL DAILY
Qty: 30 | Refills: 6 | Status: DISCONTINUED | COMMUNITY
Start: 2018-07-23 | End: 2018-11-14

## 2018-11-14 RX ORDER — PNV NO.95/FERROUS FUM/FOLIC AC 28MG-0.8MG
TABLET ORAL DAILY
Qty: 30 | Refills: 6 | Status: DISCONTINUED | COMMUNITY
Start: 2018-10-25 | End: 2018-11-14

## 2018-11-19 DIAGNOSIS — O09.93 SUPERVISION OF HIGH RISK PREGNANCY, UNSPECIFIED, THIRD TRIMESTER: ICD-10-CM

## 2018-11-19 DIAGNOSIS — O10.013 PRE-EXISTING ESSENTIAL HYPERTENSION COMPLICATING PREGNANCY, THIRD TRIMESTER: ICD-10-CM

## 2018-11-19 DIAGNOSIS — O99.213 OBESITY COMPLICATING PREGNANCY, THIRD TRIMESTER: ICD-10-CM

## 2018-11-19 DIAGNOSIS — Z3A.28 28 WEEKS GESTATION OF PREGNANCY: ICD-10-CM

## 2018-11-19 NOTE — ED ADULT NURSE NOTE - PRIMARY CARE PROVIDER
Eliquis refilled; note sent to pharmacy that patient will need follow-up appointment before any future refills as he has not been seen in a year.  
unknown MD

## 2018-11-26 ENCOUNTER — APPOINTMENT (OUTPATIENT)
Dept: ANTEPARTUM | Facility: CLINIC | Age: 38
End: 2018-11-26

## 2018-11-26 ENCOUNTER — OUTPATIENT (OUTPATIENT)
Dept: OUTPATIENT SERVICES | Facility: HOSPITAL | Age: 38
LOS: 1 days | Discharge: HOME | End: 2018-11-26

## 2018-11-26 ENCOUNTER — RESULT CHARGE (OUTPATIENT)
Age: 38
End: 2018-11-26

## 2018-11-26 VITALS
TEMPERATURE: 97.7 F | SYSTOLIC BLOOD PRESSURE: 131 MMHG | WEIGHT: 265 LBS | OXYGEN SATURATION: 98 % | BODY MASS INDEX: 45.49 KG/M2 | DIASTOLIC BLOOD PRESSURE: 82 MMHG | HEART RATE: 77 BPM

## 2018-11-26 LAB
BILIRUB UR QL STRIP: NEGATIVE
CLARITY UR: CLEAR
COLLECTION METHOD: NORMAL
FETAL HEART DESCRIPTION: NORMAL
FETAL HEART RATE (BPM): 133
FETAL MOVEMENT: PRESENT
FETAL PRESENTATION: NORMAL
GLUCOSE UR-MCNC: NEGATIVE
HCG UR QL: 0.2 EU/DL
HGB UR QL STRIP.AUTO: NEGATIVE
KETONES UR-MCNC: NEGATIVE
LEUKOCYTE ESTERASE UR QL STRIP: NEGATIVE
NITRITE UR QL STRIP: NEGATIVE
OB COMMENTS: NORMAL
PH UR STRIP: 5
PROT UR STRIP-MCNC: NORMAL
SCHEDULED VISIT: YES
SP GR UR STRIP: 1
URINE ALBUMIN/PROTEIN: NEGATIVE
URINE GLUCOSE: NEGATIVE
URINE KETONES: NEGATIVE
WEEKS GESTATION: NORMAL

## 2018-11-29 DIAGNOSIS — O99.213 OBESITY COMPLICATING PREGNANCY, THIRD TRIMESTER: ICD-10-CM

## 2018-11-29 DIAGNOSIS — Z3A.29 29 WEEKS GESTATION OF PREGNANCY: ICD-10-CM

## 2018-11-29 DIAGNOSIS — O09.93 SUPERVISION OF HIGH RISK PREGNANCY, UNSPECIFIED, THIRD TRIMESTER: ICD-10-CM

## 2018-11-29 DIAGNOSIS — O10.013 PRE-EXISTING ESSENTIAL HYPERTENSION COMPLICATING PREGNANCY, THIRD TRIMESTER: ICD-10-CM

## 2018-12-03 ENCOUNTER — APPOINTMENT (OUTPATIENT)
Dept: INTERNAL MEDICINE | Facility: CLINIC | Age: 38
End: 2018-12-03

## 2018-12-10 ENCOUNTER — APPOINTMENT (OUTPATIENT)
Dept: ANTEPARTUM | Facility: CLINIC | Age: 38
End: 2018-12-10

## 2018-12-10 ENCOUNTER — OUTPATIENT (OUTPATIENT)
Dept: OUTPATIENT SERVICES | Facility: HOSPITAL | Age: 38
LOS: 1 days | Discharge: HOME | End: 2018-12-10

## 2018-12-10 ENCOUNTER — RESULT CHARGE (OUTPATIENT)
Age: 38
End: 2018-12-10

## 2018-12-10 VITALS — TEMPERATURE: 98 F | SYSTOLIC BLOOD PRESSURE: 135 MMHG | HEART RATE: 91 BPM | DIASTOLIC BLOOD PRESSURE: 70 MMHG

## 2018-12-10 VITALS
SYSTOLIC BLOOD PRESSURE: 147 MMHG | OXYGEN SATURATION: 98 % | TEMPERATURE: 97.8 F | WEIGHT: 270.44 LBS | HEART RATE: 99 BPM | DIASTOLIC BLOOD PRESSURE: 77 MMHG | BODY MASS INDEX: 46.42 KG/M2

## 2018-12-10 VITALS — DIASTOLIC BLOOD PRESSURE: 62 MMHG | SYSTOLIC BLOOD PRESSURE: 131 MMHG | HEART RATE: 77 BPM

## 2018-12-10 DIAGNOSIS — Z98.890 OTHER SPECIFIED POSTPROCEDURAL STATES: Chronic | ICD-10-CM

## 2018-12-10 LAB
ALBUMIN SERPL ELPH-MCNC: 3.8 G/DL — SIGNIFICANT CHANGE UP (ref 3.5–5.2)
ALP SERPL-CCNC: 102 U/L — SIGNIFICANT CHANGE UP (ref 30–115)
ALT FLD-CCNC: 11 U/L — SIGNIFICANT CHANGE UP (ref 0–41)
ANION GAP SERPL CALC-SCNC: 19 MMOL/L — HIGH (ref 7–14)
APPEARANCE UR: ABNORMAL
AST SERPL-CCNC: 16 U/L — SIGNIFICANT CHANGE UP (ref 0–41)
BACTERIA # UR AUTO: ABNORMAL /HPF
BILIRUB SERPL-MCNC: <0.2 MG/DL — SIGNIFICANT CHANGE UP (ref 0.2–1.2)
BILIRUB UR QL STRIP: NEGATIVE
BILIRUB UR-MCNC: NEGATIVE — SIGNIFICANT CHANGE UP
BUN SERPL-MCNC: 8 MG/DL — LOW (ref 10–20)
CALCIUM SERPL-MCNC: 9.2 MG/DL — SIGNIFICANT CHANGE UP (ref 8.5–10.1)
CHLORIDE SERPL-SCNC: 100 MMOL/L — SIGNIFICANT CHANGE UP (ref 98–110)
CLARITY UR: CLEAR
CO2 SERPL-SCNC: 19 MMOL/L — SIGNIFICANT CHANGE UP (ref 17–32)
COLLECTION METHOD: NORMAL
COLOR SPEC: YELLOW — SIGNIFICANT CHANGE UP
CREAT ?TM UR-MCNC: 120 MG/DL — SIGNIFICANT CHANGE UP
CREAT SERPL-MCNC: 0.5 MG/DL — LOW (ref 0.7–1.5)
DIFF PNL FLD: NEGATIVE — SIGNIFICANT CHANGE UP
EPI CELLS # UR: ABNORMAL /HPF
FETAL HEART DESCRIPTION: NORMAL
FETAL HEART RATE (BPM): 147
FETAL MOVEMENT: PRESENT
FETAL PRESENTATION: NORMAL
GLUCOSE SERPL-MCNC: 78 MG/DL — SIGNIFICANT CHANGE UP (ref 70–99)
GLUCOSE UR QL: NEGATIVE MG/DL — SIGNIFICANT CHANGE UP
GLUCOSE UR-MCNC: NEGATIVE
HCG UR QL: 0.2 EU/DL
HCT VFR BLD CALC: 36.9 % — LOW (ref 37–47)
HGB BLD-MCNC: 12.3 G/DL — SIGNIFICANT CHANGE UP (ref 12–16)
HGB UR QL STRIP.AUTO: NEGATIVE
KETONES UR-MCNC: NEGATIVE
KETONES UR-MCNC: NEGATIVE — SIGNIFICANT CHANGE UP
LDH SERPL L TO P-CCNC: 149 — SIGNIFICANT CHANGE UP (ref 50–242)
LEUKOCYTE ESTERASE UR QL STRIP: NEGATIVE
LEUKOCYTE ESTERASE UR-ACNC: ABNORMAL
MCHC RBC-ENTMCNC: 28.3 PG — SIGNIFICANT CHANGE UP (ref 27–31)
MCHC RBC-ENTMCNC: 33.3 G/DL — SIGNIFICANT CHANGE UP (ref 32–37)
MCV RBC AUTO: 85 FL — SIGNIFICANT CHANGE UP (ref 81–99)
NITRITE UR QL STRIP: NEGATIVE
NITRITE UR-MCNC: POSITIVE
NRBC # BLD: 0 /100 WBCS — SIGNIFICANT CHANGE UP (ref 0–0)
OB COMMENTS: NORMAL
PH UR STRIP: 6
PH UR: 6.5 — SIGNIFICANT CHANGE UP (ref 5–8)
PLATELET # BLD AUTO: 254 K/UL — SIGNIFICANT CHANGE UP (ref 130–400)
POTASSIUM SERPL-MCNC: 4.3 MMOL/L — SIGNIFICANT CHANGE UP (ref 3.5–5)
POTASSIUM SERPL-SCNC: 4.3 MMOL/L — SIGNIFICANT CHANGE UP (ref 3.5–5)
PROT ?TM UR-MCNC: 40 MG/DLG/24H — SIGNIFICANT CHANGE UP
PROT SERPL-MCNC: 6.9 G/DL — SIGNIFICANT CHANGE UP (ref 6–8)
PROT UR STRIP-MCNC: 30
PROT UR-MCNC: ABNORMAL MG/DL
PROT/CREAT UR-RTO: 0.3 RATIO — HIGH (ref 0–0.2)
RBC # BLD: 4.34 M/UL — SIGNIFICANT CHANGE UP (ref 4.2–5.4)
RBC # FLD: 15 % — HIGH (ref 11.5–14.5)
SCHEDULED VISIT: YES
SODIUM SERPL-SCNC: 138 MMOL/L — SIGNIFICANT CHANGE UP (ref 135–146)
SP GR SPEC: 1.02 — SIGNIFICANT CHANGE UP (ref 1.01–1.03)
SP GR UR STRIP: 1.03
URATE SERPL-MCNC: 3.8 MG/DL — SIGNIFICANT CHANGE UP (ref 2.5–7)
URINE ALBUMIN/PROTEIN: 30
URINE GLUCOSE: NEGATIVE
URINE KETONES: NEGATIVE
UROBILINOGEN FLD QL: 1 MG/DL (ref 0.2–0.2)
WBC # BLD: 10.62 K/UL — SIGNIFICANT CHANGE UP (ref 4.8–10.8)
WBC # FLD AUTO: 10.62 K/UL — SIGNIFICANT CHANGE UP (ref 4.8–10.8)
WBC UR QL: SIGNIFICANT CHANGE UP /HPF
WEEKS GESTATION: NORMAL

## 2018-12-10 NOTE — PROGRESS NOTE ADULT - ASSESSMENT
37 yo  at 31w6d w/ ROSE 2019 by LMP consistent with 1st trimester sonogram, with CHTN s/p prolonged BP monitoring to r/o superimposed preeclampsia with positive nitrites on UA asymptomatic  - To be sent home with 24hr UTP  - Macrobid rx  - F/u UCX  - Cont home BP monitoring  - F/u with [facility] at next visit (in 2 days)  - PTL precautions  - Oral hydration encouraged  - [facility] instructions    Dr. Batista aware

## 2018-12-10 NOTE — OB PROVIDER TRIAGE NOTE - PLAN OF CARE
Normal blood pressures Discharge home  Labor Precautions given  Oral hydration encouraged  Tylenol PRN for pain  FKC instructions  F/u with OB at next scheduled appt  If you have headaches, vision changes, chest pain, shortness of breath, pain in your right upper abdomen, new swelling please call your doctor and go to the emergency room.  Take your prescribed antibiotics as directed  Do another 24 hour urine collection

## 2018-12-10 NOTE — OB PROVIDER TRIAGE NOTE - ADDITIONAL INSTRUCTIONS
Discharge home  Labor Precautions given  Oral hydration encouraged  Tylenol PRN for pain  FKC instructions  F/u with OB at next scheduled appt  If you have headaches, vision changes, chest pain, shortness of breath, pain in your right upper abdomen, new swelling please call your doctor and go to the emergency room.  Take your prescribed antibiotics as directed  Do another 24 hour urine collection

## 2018-12-10 NOTE — PROGRESS NOTE ADULT - SUBJECTIVE AND OBJECTIVE BOX
PGY 3 Note:      Patient s/p prolonged BP monitoring. Feeling well. Denies headache, vision changes, chest pain, shortness of breath, right upper or epigastric abdominal pain, new swelling.       Vital Signs Last 24 Hrs  T(C): 36.7 (10 Dec 2018 13:36), Max: 36.7 (10 Dec 2018 13:15)  T(F): 98 (10 Dec 2018 13:36), Max: 98 (10 Dec 2018 13:15)  HR: 77 (10 Dec 2018 19:29) (64 - 96)  BP: 135/64 (10 Dec 2018 19:29) (127/58 - 162/79)  RR: 18 (10 Dec 2018 13:36) (18 - 18)      Gen: NAD, A&OX3  Heart: S1S2, RRR  Lungs: CTAB  Abd: Gravid, soft, NT, no palpable ctx, no RUQ/epigastric tenderness  VE: Deferred  Ext: No edema or calf tenderness  EFM: 155/mod/+accels  [place]: Occ, not palpable    Labs:                        12.3   10.62 )-----------( 254      ( 10 Dec 2018 15:30 )             36.9     12-10    138  |  100  |  8<L>  ----------------------------<  78  4.3   |  19  |  0.5<L>    Ca    9.2      10 Dec 2018 15:30    TPro  6.9  /  Alb  3.8  /  TBili  <0.2  /  DBili  x   /  AST  16  /  ALT  11  /  AlkPhos  102  12-10    Lactate Dehydrogenase, Serum (12.10.18 @ 15:30)    Lactate Dehydrogenase, Serum: 149    Uric Acid, Serum (12.10.18 @ 15:30)    Uric Acid, Serum: 3.8 mg/dL    Urinalysis Basic - ( 10 Dec 2018 16:00 )  Color: Yellow / Appearance: Cloudy / S.025 / pH: x  Gluc: x / Ketone: Negative  / Bili: Negative / Urobili: 1.0 mg/dL   Blood: x / Protein: Trace mg/dL / Nitrite: Positive   Leuk Esterase: Trace / RBC: x / WBC 3-5 /HPF   Sq Epi: x / Non Sq Epi: Moderate /HPF / Bacteria: Many /HPF

## 2018-12-10 NOTE — OB PROVIDER TRIAGE NOTE - HISTORY OF PRESENT ILLNESS
39 yo  at 31w6d w/ ROSE 2019 by ... here because she was sent from the [facility] for elevated BP of 147/77 in the office today. Pt has h/o cHTN and h/o preeclampsia in her previous pregnancy. Denies contractions, LOF and VB. Feels good FM. Denies HA, blurry vision, SOB, chest pain, palpitations, RUQ/epigastric pain and N/V. No other complications in this pregnancy.    Allergies:  Meds: 37 yo  at 31w6d w/ ROSE 2019 by ... here because she was sent from the [facility] for elevated BP of 147/77 in the office today. Pt has h/o cHTN and h/o preeclampsia in her previous pregnancy. BPs well controlled on medication, as per pt 128-131/67-79 per pt, Udip in the office today also neg. Denies contractions, LOF and VB. Feels good FM. Denies HA, blurry vision, SOB, chest pain, palpitations, RUQ/epigastric pain and N/V. No other complications in this pregnancy.    Allergies:   Meds: ASA 81 mg per day, methyldopa 500 mg bid 39 yo  at 31w6d w/ ROSE 2019 by ... here because she was sent from the [facility] for elevated BP of 147/77 in the office today. Pt has h/o cHTN and h/o preeclampsia in her previous pregnancy. BPs well controlled on medication, as per pt 128-131/67-79 per pt, Udip in the office today also neg. Denies contractions, LOF and VB. Feels good FM. Denies HA, blurry vision, SOB, chest pain, palpitations, RUQ/epigastric pain and N/V. No other complications in this pregnancy.    Allergies:   Meds: ASA 81 mg per day, methyldopa 500 mg bid      number [id number] 39 yo  at 31w6d w/ ROSE 2019 by LMP consistent with 1st trimester sonogram here because she was sent from the [facility] for elevated BP of 147/77 in the office today. Pt has h/o cHTN and h/o preeclampsia in her previous pregnancy. BPs well controlled on medication, as per pt 128-131/67-79 per pt, Udip in the office today also neg. Denies contractions, LOF and VB. Feels good FM. Denies HA, blurry vision, SOB, chest pain, palpitations, RUQ/epigastric pain and N/V. No other complications in this pregnancy.    Allergies: NKDA  Meds: ASA 81 mg per day, methyldopa 500 mg bid      number [id number] 37 yo  at 31w6d w/ ROSE 2019 by LMP consistent with 1st trimester sonogram here because she was sent from the [facility] for elevated BP of 147/77 in the office today. Pt has h/o cHTN and h/o preeclampsia in her previous pregnancy. BPs well controlled on medication, as per pt 127-131/72-80 per pt, Udip in the office today also neg. Denies contractions, LOF and VB. Feels good FM. Denies HA, blurry vision, SOB, chest pain, palpitations, RUQ/epigastric pain and N/V. No other complications in this pregnancy.    Allergies: NKDA  Meds: ASA 81 mg per day (last dose 9 am), methyldopa 500 mg bid (last dose 9 am)     number [id number] and [id number]

## 2018-12-10 NOTE — OB PROVIDER TRIAGE NOTE - NSHPLABSRESULTS_GEN_ALL_CORE
24 hour protein 252 mg (08/06/2018)   (11/12/2018)  (08/03/2018)  24 hour protein 252 mg (08/06/2018)   (11/12/2018)  Hep B (07/06/2018): nonreactive  Rubella (07/06/2018): immune  Varicella (07/06/2018): immune  RPR (07/06/2018): neg  T&S (07/06/2018): O pos, no reactive antibodies  HIV (07/06/2018): neg  GC/CG (07/06/2018): neg    [place]:  (08/03/2018)  24 hour protein 252 mg (08/06/2018)   (11/12/2018)  Hep B (07/06/2018): nonreactive  Rubella (07/06/2018): immune  Varicella (07/06/2018): immune  RPR (07/06/2018): neg  T&S (07/06/2018): O pos, no reactive antibodies  HIV (07/06/2018): neg  GC/CG (07/06/2018): neg     SMA positive - appt w/ genetic counselor at [facility] low risk     [place]:  11w6d: fundal placenta, nuchal wnl, single viable IUP  19w6d:  grams, breech, 3vc, fundal placenta, MVP 67mm, anatomy wnl (hard to visualize due to maternal habitus), CL 46mm  24w6d: fetal echo wnl   25w2d:  grams (66%),  bpm, transverse, anterior placenta, MVP 57mm, anatomy wnl  29w6d: EFW 1666 grams (63%),  bpm, cephalic, anterior placenta, MVP 62mm  31w6d: cephalic, anterior placenta, MVP 53mm, BPP 8/8, umbilical artery dopplers wnl, abnormal uterine artery dopplers    08/07/2018 TTE: mildly increased LV wall thickness, mild concentric left ventricular hyperthrophy, normal ejection function with normal ventricular systolic function, mild tricuspid regurg  08/07/2018 EKG wnl   08/03/2018: PELs wnl  (08/03/2018)  24 hour protein 252 mg (08/06/2018)   (11/12/2018)  Hep B (07/06/2018): nonreactive  Rubella (07/06/2018): immune  Varicella (07/06/2018): immune  RPR (07/06/2018): neg  T&S (07/06/2018): O pos, no reactive antibodies  HIV (07/06/2018): neg  GC/CG (07/06/2018): neg     SMA positive - appt w/ genetic counselor at [facility] low risk     [place]:  11w6d: fundal placenta, nuchal wnl, single viable IUP  19w6d:  grams, breech, 3vc, fundal placenta, MVP 67mm, anatomy wnl (hard to visualize due to maternal habitus), CL 46mm  24w6d: fetal echo wnl   25w2d:  grams (66%),  bpm, transverse, anterior placenta, MVP 57mm, anatomy wnl  29w6d: EFW 1666 grams (63%),  bpm, cephalic, anterior placenta, MVP 62mm  31w6d (12/10/2018): cephalic, anterior placenta, MVP 53mm, BPP 8/8, umbilical artery dopplers wnl, abnormal uterine artery dopplers    08/07/2018 TTE: mildly increased LV wall thickness, mild concentric left ventricular hyperthrophy, normal ejection function with normal ventricular systolic function, mild tricuspid regurg  08/07/2018 EKG wnl   08/03/2018: PELs wnl  (08/03/2018)  24 hour protein 252 mg (08/06/2018)   (11/12/2018)  Hep B (07/06/2018): nonreactive  Rubella (07/06/2018): immune  Varicella (07/06/2018): immune  RPR (07/06/2018): neg  T&S (07/06/2018): O pos, no reactive antibodies  HIV (07/06/2018): neg  GC/CG (07/06/2018): neg     SMA carrier - appt w/ genetic counselor at [facility] low risk     [place]:  11w6d: fundal placenta, nuchal wnl, single viable IUP  19w6d:  grams, breech, 3vc, fundal placenta, MVP 67mm, anatomy wnl (hard to visualize due to maternal habitus), CL 46mm  24w6d: fetal echo wnl   25w2d:  grams (66%),  bpm, transverse, anterior placenta, MVP 57mm, anatomy wnl  29w6d: EFW 1666 grams (63%),  bpm, cephalic, anterior placenta, MVP 62mm  31w6d (12/10/2018): cephalic, anterior placenta, MVP 53mm, BPP 8/8, umbilical artery dopplers wnl, abnormal uterine artery dopplers    08/07/2018 TTE: mildly increased LV wall thickness, mild concentric left ventricular hyperthrophy, normal ejection function with normal ventricular systolic function, mild tricuspid regurg  08/07/2018 EKG wnl   08/03/2018: PELs wnl

## 2018-12-10 NOTE — OB PROVIDER TRIAGE NOTE - NS_OBGYNHISTORY_OBGYN_ALL_OB_FT
OB Hx:  GYN Hx: denies h/o fibroids, abnormal paps, ovarian cysts and STIs OB Hx: FT c/s x2,   GYN Hx: denies h/o fibroids, abnormal paps, ovarian cysts and STIs OB Hx: FT c/s x2, 1st for failure to dilate and HRFHR and second one for IOL for preeclampsia, second 6-8 lb (first baby's weight unknown but bigger)  GYN Hx: denies h/o fibroids, abnormal paps, ovarian cysts and STIs OB Hx: FT c/s x2, 1st (39w - [place]) for failure to dilate and NRFHR and second one (38w - [place]) for IOL for preeclampsia (no Magnesium), second 6-8 lb (first baby's weight unknown but bigger), pt believes that they were both classical   GYN Hx: denies h/o fibroids, abnormal paps, ovarian cysts and STIs OB Hx: FT c/s x2, 1st (39w - [place]) for failure to dilate and NRFHR and second one (38w - [place]) for IOL for preeclampsia (no Magnesium), second 6-8 lb (first baby's weight unknown but bigger), vertical skin incision, unknown uterine incision.  GYN Hx: denies h/o fibroids, abnormal paps, ovarian cysts and STIs

## 2018-12-10 NOTE — OB PROVIDER TRIAGE NOTE - NSOBPROVIDERNOTE_OBGYN_ALL_OB_FT
37 yo  at 31w6d, GBS unknown, AMA, SMA pos, cHTN, h/o preeclampsia in prev pregnancy, here for BP monitoring,     -PEL labs  -24 hour urine protein  -BPs q15min  -Monitor for PEL symptoms  -Continuous EFM/toco    Dr. Padilla and Dr. Andres to be made aware. 37 yo  at 31w6d, GBS unknown, AMA, SMA pos, cHTN, h/o preeclampsia in prev pregnancy, here for BP monitoring, r/o superimposed preeclampsia,     -PEL labs  -BPs q15min  -Monitor for PEL symptoms  -Continuous EFM/toco  -Regular diet  -Activity: bedrest    Dr. Padilla and Dr. Andres aware. 39 yo  at 31w6d, GBS unknown, AMA, SMA carrier, cHTN, h/o preeclampsia in prev pregnancy, here for BP monitoring, r/o superimposed preeclampsia,     -PEL labs  -BPs q15min  -Monitor for PEL symptoms  -Continuous EFM/toco  -Regular diet  -Activity: bedrest    Dr. Padilla and Dr. Andres aware. 37 yo  at 31w6d, GBS unknown, AMA, SMA carrier, cHTN, h/o preeclampsia in prev pregnancy, here for BP monitoring, r/o superimposed preeclampsia,     -PEL labs  -BPs q15min  -Monitor for PEL symptoms  -Continuous EFM/toco  -NPO  -Activity: bedrest w/ bathroom privileges    Dr. Padilla and Dr. Andres aware. 39 yo  at 31w6d, GBS unknown, AMA, SMA carrier, cHTN, h/o preeclampsia in prev pregnancy, here for BP monitoring, r/o superimposed preeclampsia,     -PEL labs  -BPs q15min  -Monitor for PEL symptoms  -Continuous EFM/toco  -NPO  -Activity: bedrest w/ bathroom privileges      Attending: Pt seen and examined with resident and agree with resident note and plan of care    nst reactive  after 4hrs bp monitoring does not meet criteris for bp elevation above baseline  f/u 2 days bp check  Dr. Padilla and Dr. Andres aware.

## 2018-12-10 NOTE — OB PROVIDER TRIAGE NOTE - NSHPPHYSICALEXAM_GEN_ALL_CORE
Vital Signs Last 24 Hrs  T(C): 36.7 (10 Dec 2018 13:15), Max: 36.7 (10 Dec 2018 13:15)  T(F): 98 (10 Dec 2018 13:15), Max: 98 (10 Dec 2018 13:15)  HR: 91 (10 Dec 2018 13:15) (91 - 91)  BP: 135/70 (10 Dec 2018 13:15) (135/70 - 135/70)    Udip: neg  EFM:   [place]:   SVE: deferred  Abd: NT, gravid, no palpable contractions Vital Signs Last 24 Hrs  T(C): 36.7 (10 Dec 2018 13:15), Max: 36.7 (10 Dec 2018 13:15)  T(F): 98 (10 Dec 2018 13:15), Max: 98 (10 Dec 2018 13:15)  HR: 91 (10 Dec 2018 13:15) (91 - 91)  BP: 135/70 (10 Dec 2018 13:15) (135/70 - 135/70)    Udip: neg  EFM: 140/mod/accel+  [place]: none  SVE: deferred  Abd: NT, gravid, no palpable contractions, no RUQ/epigatric tenderness upon palpation, no incisional tenderness  Chest: RRR, no extra heart sounds or murmurs, lungs clear to auscultation bilaterally Vital Signs Last 24 Hrs  T(C): 36.7 (10 Dec 2018 13:15), Max: 36.7 (10 Dec 2018 13:15)  T(F): 98 (10 Dec 2018 13:15), Max: 98 (10 Dec 2018 13:15)  HR: 91 (10 Dec 2018 13:15) (91 - 91)  BP: 135/70 (10 Dec 2018 13:15) (135/70 - 135/70)    Udip: neg  EFM: 140/mod/accel+  [place]: none  SVE: deferred  Abd: NT, gravid, no palpable contractions, no RUQ/epigatric tenderness upon palpation, no incisional tenderness  Chest: RRR, no extra heart sounds or murmurs, lungs clear to auscultation bilaterally  Neuro: UE reflexes 2+ bilaterally Vital Signs Last 24 Hrs  T(C): 36.7 (10 Dec 2018 13:15), Max: 36.7 (10 Dec 2018 13:15)  T(F): 98 (10 Dec 2018 13:15), Max: 98 (10 Dec 2018 13:15)  HR: 91 (10 Dec 2018 13:15) (91 - 91)  BP: 135/70 (10 Dec 2018 13:15) (135/70 - 135/70)    ros: Negative for constitutional, cardio, pulmonary, gi, gu, ext, skin, neuro, psychiatric issues    Udip: neg  EFM: 140/mod/accel+  general Nad  lung Ctabl  cardio;rrr  [place]: none  SVE: deferred  Abd: NT, gravid, no palpable contractions, no RUQ/epigatric tenderness upon palpation, no incisional tenderness  Chest: RRR, no extra heart sounds or murmurs, lungs clear to auscultation bilaterally  Neuro: UE reflexes 2+ bilaterally  ext: noedema, neg homans

## 2018-12-11 DIAGNOSIS — O09.93 SUPERVISION OF HIGH RISK PREGNANCY, UNSPECIFIED, THIRD TRIMESTER: ICD-10-CM

## 2018-12-11 DIAGNOSIS — Z3A.31 31 WEEKS GESTATION OF PREGNANCY: ICD-10-CM

## 2018-12-11 DIAGNOSIS — O10.013 PRE-EXISTING ESSENTIAL HYPERTENSION COMPLICATING PREGNANCY, THIRD TRIMESTER: ICD-10-CM

## 2018-12-11 DIAGNOSIS — O99.213 OBESITY COMPLICATING PREGNANCY, THIRD TRIMESTER: ICD-10-CM

## 2018-12-12 ENCOUNTER — APPOINTMENT (OUTPATIENT)
Dept: ANTEPARTUM | Facility: CLINIC | Age: 38
End: 2018-12-12

## 2018-12-14 ENCOUNTER — OUTPATIENT (OUTPATIENT)
Dept: OUTPATIENT SERVICES | Facility: HOSPITAL | Age: 38
LOS: 1 days | Discharge: HOME | End: 2018-12-14

## 2018-12-14 ENCOUNTER — LABORATORY RESULT (OUTPATIENT)
Age: 38
End: 2018-12-14

## 2018-12-14 DIAGNOSIS — Z98.890 OTHER SPECIFIED POSTPROCEDURAL STATES: Chronic | ICD-10-CM

## 2018-12-14 DIAGNOSIS — Z34.90 ENCOUNTER FOR SUPERVISION OF NORMAL PREGNANCY, UNSPECIFIED, UNSPECIFIED TRIMESTER: ICD-10-CM

## 2018-12-14 PROBLEM — I10 ESSENTIAL (PRIMARY) HYPERTENSION: Chronic | Status: ACTIVE | Noted: 2018-12-10

## 2018-12-17 ENCOUNTER — APPOINTMENT (OUTPATIENT)
Dept: ANTEPARTUM | Facility: CLINIC | Age: 38
End: 2018-12-17

## 2018-12-17 ENCOUNTER — OUTPATIENT (OUTPATIENT)
Dept: OUTPATIENT SERVICES | Facility: HOSPITAL | Age: 38
LOS: 1 days | Discharge: HOME | End: 2018-12-17

## 2018-12-17 ENCOUNTER — RESULT CHARGE (OUTPATIENT)
Age: 38
End: 2018-12-17

## 2018-12-17 VITALS
HEART RATE: 86 BPM | BODY MASS INDEX: 46.41 KG/M2 | SYSTOLIC BLOOD PRESSURE: 133 MMHG | DIASTOLIC BLOOD PRESSURE: 69 MMHG | WEIGHT: 270.38 LBS | TEMPERATURE: 97.6 F | OXYGEN SATURATION: 96 %

## 2018-12-17 DIAGNOSIS — Z98.890 OTHER SPECIFIED POSTPROCEDURAL STATES: Chronic | ICD-10-CM

## 2018-12-17 LAB
BILIRUB UR QL STRIP: NEGATIVE
CLARITY UR: CLEAR
COLLECTION METHOD: NORMAL
FETAL MOVEMENT: PRESENT
GLUCOSE UR-MCNC: NEGATIVE
HCG UR QL: 0.2 EU/DL
HGB UR QL STRIP.AUTO: NEGATIVE
KETONES UR-MCNC: NEGATIVE
LEUKOCYTE ESTERASE UR QL STRIP: NEGATIVE
NITRITE UR QL STRIP: NEGATIVE
OB COMMENTS: NORMAL
PH UR STRIP: 7.5
PROT UR STRIP-MCNC: NEGATIVE
SCHEDULED VISIT: YES
SP GR UR STRIP: 1.01
URINE ALBUMIN/PROTEIN: NEGATIVE
URINE GLUCOSE: NEGATIVE
URINE KETONES: NEGATIVE
WEEKS GESTATION: NORMAL

## 2018-12-24 ENCOUNTER — APPOINTMENT (OUTPATIENT)
Dept: ANTEPARTUM | Facility: CLINIC | Age: 38
End: 2018-12-24

## 2018-12-28 ENCOUNTER — RX RENEWAL (OUTPATIENT)
Age: 38
End: 2018-12-28

## 2019-01-02 ENCOUNTER — RESULT CHARGE (OUTPATIENT)
Age: 39
End: 2019-01-02

## 2019-01-02 ENCOUNTER — NON-APPOINTMENT (OUTPATIENT)
Age: 39
End: 2019-01-02

## 2019-01-02 ENCOUNTER — APPOINTMENT (OUTPATIENT)
Dept: ANTEPARTUM | Facility: CLINIC | Age: 39
End: 2019-01-02

## 2019-01-02 ENCOUNTER — OUTPATIENT (OUTPATIENT)
Dept: OUTPATIENT SERVICES | Facility: HOSPITAL | Age: 39
LOS: 1 days | Discharge: HOME | End: 2019-01-02

## 2019-01-02 VITALS
OXYGEN SATURATION: 97 % | HEART RATE: 89 BPM | TEMPERATURE: 98.1 F | SYSTOLIC BLOOD PRESSURE: 145 MMHG | DIASTOLIC BLOOD PRESSURE: 92 MMHG | BODY MASS INDEX: 47.72 KG/M2 | WEIGHT: 278 LBS

## 2019-01-02 DIAGNOSIS — Z98.890 OTHER SPECIFIED POSTPROCEDURAL STATES: Chronic | ICD-10-CM

## 2019-01-02 LAB
BILIRUB UR QL STRIP: NORMAL
CLARITY UR: CLEAR
COLLECTION METHOD: NORMAL
FETAL HEART DESCRIPTION: NORMAL
FETAL HEART RATE (BPM): 142
FETAL MOVEMENT: PRESENT
GLUCOSE UR-MCNC: NORMAL
HCG UR QL: NORMAL EU/DL
HGB UR QL STRIP.AUTO: NORMAL
KETONES UR-MCNC: NORMAL
LEUKOCYTE ESTERASE UR QL STRIP: NORMAL
NITRITE UR QL STRIP: NORMAL
OB COMMENTS: NORMAL
PH UR STRIP: 5
PROT UR STRIP-MCNC: NORMAL
SCHEDULED VISIT: YES
SP GR UR STRIP: 1.01
URINE ALBUMIN/PROTEIN: NORMAL
URINE GLUCOSE: NEGATIVE
URINE KETONES: NEGATIVE
WEEKS GESTATION: 35.1

## 2019-01-02 RX ORDER — LABETALOL HYDROCHLORIDE 100 MG/1
100 TABLET, FILM COATED ORAL
Qty: 60 | Refills: 3 | Status: ACTIVE | COMMUNITY
Start: 2019-01-02 | End: 1900-01-01

## 2019-01-02 RX ORDER — METHYLDOPA 500 MG/1
500 TABLET, FILM COATED ORAL
Qty: 60 | Refills: 6 | Status: DISCONTINUED | COMMUNITY
Start: 2018-08-14 | End: 2019-01-02

## 2019-01-03 DIAGNOSIS — O09.93 SUPERVISION OF HIGH RISK PREGNANCY, UNSPECIFIED, THIRD TRIMESTER: ICD-10-CM

## 2019-01-03 DIAGNOSIS — O10.013 PRE-EXISTING ESSENTIAL HYPERTENSION COMPLICATING PREGNANCY, THIRD TRIMESTER: ICD-10-CM

## 2019-01-03 DIAGNOSIS — O99.213 OBESITY COMPLICATING PREGNANCY, THIRD TRIMESTER: ICD-10-CM

## 2019-01-03 DIAGNOSIS — Z3A.35 35 WEEKS GESTATION OF PREGNANCY: ICD-10-CM

## 2019-01-09 ENCOUNTER — APPOINTMENT (OUTPATIENT)
Dept: ANTEPARTUM | Facility: CLINIC | Age: 39
End: 2019-01-09

## 2019-01-09 ENCOUNTER — OUTPATIENT (OUTPATIENT)
Dept: OUTPATIENT SERVICES | Facility: HOSPITAL | Age: 39
LOS: 1 days | Discharge: HOME | End: 2019-01-09

## 2019-01-09 ENCOUNTER — NON-APPOINTMENT (OUTPATIENT)
Age: 39
End: 2019-01-09

## 2019-01-09 VITALS — HEART RATE: 75 BPM | BODY MASS INDEX: 47.89 KG/M2 | OXYGEN SATURATION: 97 % | WEIGHT: 279 LBS | TEMPERATURE: 98.1 F

## 2019-01-09 DIAGNOSIS — Z98.890 OTHER SPECIFIED POSTPROCEDURAL STATES: Chronic | ICD-10-CM

## 2019-01-09 LAB
BILIRUB UR QL STRIP: NEGATIVE
BP DIAS: 67 MM HG
BP SYS: 137 MM HG
CLARITY UR: CLEAR
COLLECTION METHOD: NORMAL
FETAL HEART DESCRIPTION: NORMAL
FETAL HEART RATE (BPM): 148
FETAL MOVEMENT: PRESENT
FETAL PRESENTATION: NORMAL
GLUCOSE UR-MCNC: NEGATIVE
HCG UR QL: 0.2 EU/DL
HGB UR QL STRIP.AUTO: NEGATIVE
KETONES UR-MCNC: NEGATIVE
LEUKOCYTE ESTERASE UR QL STRIP: NEGATIVE
NITRITE UR QL STRIP: NEGATIVE
OB COMMENTS: NORMAL
PH UR STRIP: 6.5
PROT UR STRIP-MCNC: NORMAL
SCHEDULED VISIT: YES
SP GR UR STRIP: 1.02
URINE ALBUMIN/PROTEIN: NORMAL
URINE GLUCOSE: NEGATIVE
URINE KETONES: NEGATIVE
WEEKS GESTATION: 36.1

## 2019-01-10 DIAGNOSIS — O99.213 OBESITY COMPLICATING PREGNANCY, THIRD TRIMESTER: ICD-10-CM

## 2019-01-10 DIAGNOSIS — O09.93 SUPERVISION OF HIGH RISK PREGNANCY, UNSPECIFIED, THIRD TRIMESTER: ICD-10-CM

## 2019-01-10 DIAGNOSIS — O10.013 PRE-EXISTING ESSENTIAL HYPERTENSION COMPLICATING PREGNANCY, THIRD TRIMESTER: ICD-10-CM

## 2019-01-16 ENCOUNTER — APPOINTMENT (OUTPATIENT)
Dept: ANTEPARTUM | Facility: CLINIC | Age: 39
End: 2019-01-16

## 2019-01-16 ENCOUNTER — NON-APPOINTMENT (OUTPATIENT)
Age: 39
End: 2019-01-16

## 2019-01-16 ENCOUNTER — OUTPATIENT (OUTPATIENT)
Dept: OUTPATIENT SERVICES | Facility: HOSPITAL | Age: 39
LOS: 1 days | Discharge: HOME | End: 2019-01-16

## 2019-01-16 ENCOUNTER — RESULT CHARGE (OUTPATIENT)
Age: 39
End: 2019-01-16

## 2019-01-16 VITALS
OXYGEN SATURATION: 100 % | HEART RATE: 72 BPM | DIASTOLIC BLOOD PRESSURE: 76 MMHG | TEMPERATURE: 98.1 F | BODY MASS INDEX: 48.28 KG/M2 | SYSTOLIC BLOOD PRESSURE: 144 MMHG | WEIGHT: 281.25 LBS

## 2019-01-16 DIAGNOSIS — Z98.890 OTHER SPECIFIED POSTPROCEDURAL STATES: Chronic | ICD-10-CM

## 2019-01-16 DIAGNOSIS — O09.93 SUPERVISION OF HIGH RISK PREGNANCY, UNSPECIFIED, THIRD TRIMESTER: ICD-10-CM

## 2019-01-16 DIAGNOSIS — O34.211 MATERNAL CARE FOR LOW TRANSVERSE SCAR FROM PREVIOUS CESAREAN DELIVERY: ICD-10-CM

## 2019-01-16 LAB
BILIRUB UR QL STRIP: NEGATIVE
CLARITY UR: CLEAR
COLLECTION METHOD: NORMAL
FETAL MOVEMENT: PRESENT
GLUCOSE UR-MCNC: NEGATIVE
GP B STREP DNA SPEC QL NAA+PROBE: NORMAL
GP B STREP DNA SPEC QL NAA+PROBE: NOT DETECTED
HCG UR QL: 0.2 EU/DL
HGB UR QL STRIP.AUTO: NEGATIVE
KETONES UR-MCNC: NEGATIVE
LEUKOCYTE ESTERASE UR QL STRIP: NEGATIVE
NITRITE UR QL STRIP: NEGATIVE
OB COMMENTS: NORMAL
PH UR STRIP: 6
PROT UR STRIP-MCNC: 30
SCHEDULED VISIT: YES
SOURCE GBS: NORMAL
SP GR UR STRIP: 1.03
URINE ALBUMIN/PROTEIN: 30
URINE GLUCOSE: NEGATIVE
URINE KETONES: NEGATIVE
WEEKS GESTATION: NORMAL

## 2019-01-17 DIAGNOSIS — O09.93 SUPERVISION OF HIGH RISK PREGNANCY, UNSPECIFIED, THIRD TRIMESTER: ICD-10-CM

## 2019-01-17 DIAGNOSIS — Z3A.37 37 WEEKS GESTATION OF PREGNANCY: ICD-10-CM

## 2019-01-17 DIAGNOSIS — O34.211 MATERNAL CARE FOR LOW TRANSVERSE SCAR FROM PREVIOUS CESAREAN DELIVERY: ICD-10-CM

## 2019-01-17 DIAGNOSIS — O10.013 PRE-EXISTING ESSENTIAL HYPERTENSION COMPLICATING PREGNANCY, THIRD TRIMESTER: ICD-10-CM

## 2019-01-17 DIAGNOSIS — O99.213 OBESITY COMPLICATING PREGNANCY, THIRD TRIMESTER: ICD-10-CM

## 2019-01-24 ENCOUNTER — NON-APPOINTMENT (OUTPATIENT)
Age: 39
End: 2019-01-24

## 2019-01-24 ENCOUNTER — INPATIENT (INPATIENT)
Facility: HOSPITAL | Age: 39
LOS: 5 days | Discharge: HOME HEALTH | End: 2019-01-30
Attending: OBSTETRICS & GYNECOLOGY | Admitting: OBSTETRICS & GYNECOLOGY

## 2019-01-24 ENCOUNTER — RESULT REVIEW (OUTPATIENT)
Age: 39
End: 2019-01-24

## 2019-01-24 ENCOUNTER — APPOINTMENT (OUTPATIENT)
Dept: ANTEPARTUM | Facility: CLINIC | Age: 39
End: 2019-01-24

## 2019-01-24 ENCOUNTER — RESULT CHARGE (OUTPATIENT)
Age: 39
End: 2019-01-24

## 2019-01-24 ENCOUNTER — OUTPATIENT (OUTPATIENT)
Dept: OUTPATIENT SERVICES | Facility: HOSPITAL | Age: 39
LOS: 1 days | Discharge: HOME | End: 2019-01-24

## 2019-01-24 VITALS
OXYGEN SATURATION: 98 % | TEMPERATURE: 98.1 F | SYSTOLIC BLOOD PRESSURE: 162 MMHG | WEIGHT: 286 LBS | DIASTOLIC BLOOD PRESSURE: 86 MMHG | BODY MASS INDEX: 49.09 KG/M2 | HEART RATE: 76 BPM

## 2019-01-24 VITALS — DIASTOLIC BLOOD PRESSURE: 81 MMHG | SYSTOLIC BLOOD PRESSURE: 165 MMHG

## 2019-01-24 VITALS — TEMPERATURE: 98 F

## 2019-01-24 DIAGNOSIS — Z98.890 OTHER SPECIFIED POSTPROCEDURAL STATES: Chronic | ICD-10-CM

## 2019-01-24 LAB
ALBUMIN SERPL ELPH-MCNC: 3.6 G/DL — SIGNIFICANT CHANGE UP (ref 3.5–5.2)
ALP SERPL-CCNC: 136 U/L — HIGH (ref 30–115)
ALT FLD-CCNC: 11 U/L — SIGNIFICANT CHANGE UP (ref 0–41)
AMPHET UR-MCNC: NEGATIVE — SIGNIFICANT CHANGE UP
ANION GAP SERPL CALC-SCNC: 21 MMOL/L — HIGH (ref 7–14)
APPEARANCE UR: CLEAR — SIGNIFICANT CHANGE UP
AST SERPL-CCNC: 21 U/L — SIGNIFICANT CHANGE UP (ref 0–41)
BARBITURATES UR SCN-MCNC: NEGATIVE — SIGNIFICANT CHANGE UP
BASOPHILS # BLD AUTO: 0.02 K/UL — SIGNIFICANT CHANGE UP (ref 0–0.2)
BASOPHILS NFR BLD AUTO: 0.2 % — SIGNIFICANT CHANGE UP (ref 0–1)
BENZODIAZ UR-MCNC: NEGATIVE — SIGNIFICANT CHANGE UP
BILIRUB SERPL-MCNC: 0.2 MG/DL — SIGNIFICANT CHANGE UP (ref 0.2–1.2)
BILIRUB UR QL STRIP: NEGATIVE
BILIRUB UR-MCNC: NEGATIVE — SIGNIFICANT CHANGE UP
BLD GP AB SCN SERPL QL: SIGNIFICANT CHANGE UP
BUN SERPL-MCNC: 10 MG/DL — SIGNIFICANT CHANGE UP (ref 10–20)
BUPRENORPHINE SCREEN, URINE RESULT: NEGATIVE — SIGNIFICANT CHANGE UP
CALCIUM SERPL-MCNC: 9.9 MG/DL — SIGNIFICANT CHANGE UP (ref 8.5–10.1)
CHLORIDE SERPL-SCNC: 99 MMOL/L — SIGNIFICANT CHANGE UP (ref 98–110)
CLARITY UR: CLEAR
CO2 SERPL-SCNC: 19 MMOL/L — SIGNIFICANT CHANGE UP (ref 17–32)
COCAINE METAB.OTHER UR-MCNC: NEGATIVE — SIGNIFICANT CHANGE UP
COLLECTION METHOD: NORMAL
COLOR SPEC: YELLOW — SIGNIFICANT CHANGE UP
CREAT ?TM UR-MCNC: 72 MG/DL — SIGNIFICANT CHANGE UP
CREAT SERPL-MCNC: 0.6 MG/DL — LOW (ref 0.7–1.5)
DIFF PNL FLD: NEGATIVE — SIGNIFICANT CHANGE UP
EOSINOPHIL # BLD AUTO: 0.07 K/UL — SIGNIFICANT CHANGE UP (ref 0–0.7)
EOSINOPHIL NFR BLD AUTO: 0.6 % — SIGNIFICANT CHANGE UP (ref 0–8)
EPI CELLS # UR: ABNORMAL /HPF
FETAL HEART RATE (BPM): 141
FETAL MOVEMENT: PRESENT
GLUCOSE SERPL-MCNC: 63 MG/DL — LOW (ref 70–99)
GLUCOSE UR QL: NEGATIVE MG/DL — SIGNIFICANT CHANGE UP
GLUCOSE UR-MCNC: NEGATIVE
HCG UR QL: 0.2 EU/DL
HCT VFR BLD CALC: 38.7 % — SIGNIFICANT CHANGE UP (ref 37–47)
HGB BLD-MCNC: 12.7 G/DL — SIGNIFICANT CHANGE UP (ref 12–16)
HGB UR QL STRIP.AUTO: NEGATIVE
HIV 1 & 2 AB SERPL IA.RAPID: SIGNIFICANT CHANGE UP
IMM GRANULOCYTES NFR BLD AUTO: 0.4 % — HIGH (ref 0.1–0.3)
KETONES UR-MCNC: NEGATIVE
KETONES UR-MCNC: NEGATIVE — SIGNIFICANT CHANGE UP
L&D DRUG SCREEN, URINE: SIGNIFICANT CHANGE UP
LDH SERPL L TO P-CCNC: 168 — SIGNIFICANT CHANGE UP (ref 50–242)
LEUKOCYTE ESTERASE UR QL STRIP: NEGATIVE
LEUKOCYTE ESTERASE UR-ACNC: NEGATIVE — SIGNIFICANT CHANGE UP
LYMPHOCYTES # BLD AUTO: 2.65 K/UL — SIGNIFICANT CHANGE UP (ref 1.2–3.4)
LYMPHOCYTES # BLD AUTO: 23.4 % — SIGNIFICANT CHANGE UP (ref 20.5–51.1)
MCHC RBC-ENTMCNC: 28 PG — SIGNIFICANT CHANGE UP (ref 27–31)
MCHC RBC-ENTMCNC: 32.8 G/DL — SIGNIFICANT CHANGE UP (ref 32–37)
MCV RBC AUTO: 85.4 FL — SIGNIFICANT CHANGE UP (ref 81–99)
METHADONE UR-MCNC: NEGATIVE — SIGNIFICANT CHANGE UP
MONOCYTES # BLD AUTO: 0.63 K/UL — HIGH (ref 0.1–0.6)
MONOCYTES NFR BLD AUTO: 5.6 % — SIGNIFICANT CHANGE UP (ref 1.7–9.3)
NEUTROPHILS # BLD AUTO: 7.9 K/UL — HIGH (ref 1.4–6.5)
NEUTROPHILS NFR BLD AUTO: 69.8 % — SIGNIFICANT CHANGE UP (ref 42.2–75.2)
NITRITE UR QL STRIP: NEGATIVE
NITRITE UR-MCNC: NEGATIVE — SIGNIFICANT CHANGE UP
NRBC # BLD: 0 /100 WBCS — SIGNIFICANT CHANGE UP (ref 0–0)
OB COMMENTS: NORMAL
OPIATES UR-MCNC: NEGATIVE — SIGNIFICANT CHANGE UP
OXYCODONE UR-MCNC: NEGATIVE — SIGNIFICANT CHANGE UP
PCP UR-MCNC: NEGATIVE — SIGNIFICANT CHANGE UP
PH UR STRIP: 6.5
PH UR: 6.5 — SIGNIFICANT CHANGE UP (ref 5–8)
PLATELET # BLD AUTO: 189 K/UL — SIGNIFICANT CHANGE UP (ref 130–400)
POTASSIUM SERPL-MCNC: 4.2 MMOL/L — SIGNIFICANT CHANGE UP (ref 3.5–5)
POTASSIUM SERPL-SCNC: 4.2 MMOL/L — SIGNIFICANT CHANGE UP (ref 3.5–5)
PROPOXYPHENE QUALITATIVE URINE RESULT: NEGATIVE — SIGNIFICANT CHANGE UP
PROT ?TM UR-MCNC: 27 MG/DLG/24H — SIGNIFICANT CHANGE UP
PROT SERPL-MCNC: 6.9 G/DL — SIGNIFICANT CHANGE UP (ref 6–8)
PROT UR STRIP-MCNC: 30
PROT UR-MCNC: ABNORMAL MG/DL
PROT/CREAT UR-RTO: 0.4 RATIO — HIGH (ref 0–0.2)
RBC # BLD: 4.53 M/UL — SIGNIFICANT CHANGE UP (ref 4.2–5.4)
RBC # FLD: 15.5 % — HIGH (ref 11.5–14.5)
SCHEDULED VISIT: YES
SODIUM SERPL-SCNC: 139 MMOL/L — SIGNIFICANT CHANGE UP (ref 135–146)
SP GR SPEC: 1.01 — SIGNIFICANT CHANGE UP (ref 1.01–1.03)
SP GR UR STRIP: 1.01
TYPE + AB SCN PNL BLD: SIGNIFICANT CHANGE UP
URATE SERPL-MCNC: 4 MG/DL — SIGNIFICANT CHANGE UP (ref 2.5–7)
URINE ALBUMIN/PROTEIN: 30
URINE GLUCOSE: NEGATIVE
URINE KETONES: NEGATIVE
UROBILINOGEN FLD QL: 0.2 MG/DL — SIGNIFICANT CHANGE UP (ref 0.2–0.2)
WBC # BLD: 11.32 K/UL — HIGH (ref 4.8–10.8)
WBC # FLD AUTO: 11.32 K/UL — HIGH (ref 4.8–10.8)
WEEKS GESTATION: NORMAL

## 2019-01-24 RX ORDER — OXYCODONE HYDROCHLORIDE 5 MG/1
10 TABLET ORAL
Qty: 0 | Refills: 0 | Status: DISCONTINUED | OUTPATIENT
Start: 2019-01-24 | End: 2019-01-25

## 2019-01-24 RX ORDER — MORPHINE SULFATE 50 MG/1
0.3 CAPSULE, EXTENDED RELEASE ORAL ONCE
Qty: 0 | Refills: 0 | Status: DISCONTINUED | OUTPATIENT
Start: 2019-01-24 | End: 2019-01-25

## 2019-01-24 RX ORDER — NALOXONE HYDROCHLORIDE 4 MG/.1ML
0.1 SPRAY NASAL
Qty: 0 | Refills: 0 | Status: DISCONTINUED | OUTPATIENT
Start: 2019-01-24 | End: 2019-01-25

## 2019-01-24 RX ORDER — KETOROLAC TROMETHAMINE 30 MG/ML
30 SYRINGE (ML) INJECTION EVERY 6 HOURS
Qty: 0 | Refills: 0 | Status: DISCONTINUED | OUTPATIENT
Start: 2019-01-24 | End: 2019-01-25

## 2019-01-24 RX ORDER — MAGNESIUM SULFATE 500 MG/ML
4 VIAL (ML) INJECTION ONCE
Qty: 0 | Refills: 0 | Status: COMPLETED | OUTPATIENT
Start: 2019-01-24 | End: 2019-01-25

## 2019-01-24 RX ORDER — SODIUM CHLORIDE 9 MG/ML
300 INJECTION, SOLUTION INTRAVENOUS ONCE
Qty: 0 | Refills: 0 | Status: DISCONTINUED | OUTPATIENT
Start: 2019-01-24 | End: 2019-01-25

## 2019-01-24 RX ORDER — MAGNESIUM SULFATE 500 MG/ML
2 VIAL (ML) INJECTION
Qty: 40 | Refills: 0 | Status: DISCONTINUED | OUTPATIENT
Start: 2019-01-24 | End: 2019-01-25

## 2019-01-24 RX ORDER — LABETALOL HCL 100 MG
100 TABLET ORAL
Qty: 0 | Refills: 0 | Status: DISCONTINUED | OUTPATIENT
Start: 2019-01-24 | End: 2019-01-25

## 2019-01-24 RX ORDER — OXYTOCIN 10 UNIT/ML
333.33 VIAL (ML) INJECTION
Qty: 20 | Refills: 0 | Status: DISCONTINUED | OUTPATIENT
Start: 2019-01-24 | End: 2019-01-25

## 2019-01-24 RX ORDER — OXYCODONE HYDROCHLORIDE 5 MG/1
5 TABLET ORAL
Qty: 0 | Refills: 0 | Status: DISCONTINUED | OUTPATIENT
Start: 2019-01-24 | End: 2019-01-25

## 2019-01-24 RX ORDER — SODIUM CHLORIDE 9 MG/ML
1000 INJECTION, SOLUTION INTRAVENOUS
Qty: 0 | Refills: 0 | Status: DISCONTINUED | OUTPATIENT
Start: 2019-01-24 | End: 2019-01-25

## 2019-01-24 RX ORDER — HYDROMORPHONE HYDROCHLORIDE 2 MG/ML
1 INJECTION INTRAMUSCULAR; INTRAVENOUS; SUBCUTANEOUS
Qty: 0 | Refills: 0 | Status: DISCONTINUED | OUTPATIENT
Start: 2019-01-24 | End: 2019-01-25

## 2019-01-24 RX ORDER — ONDANSETRON 8 MG/1
4 TABLET, FILM COATED ORAL EVERY 6 HOURS
Qty: 0 | Refills: 0 | Status: DISCONTINUED | OUTPATIENT
Start: 2019-01-24 | End: 2019-01-30

## 2019-01-24 RX ORDER — CEFAZOLIN SODIUM 1 G
3000 VIAL (EA) INJECTION ONCE
Qty: 0 | Refills: 0 | Status: COMPLETED | OUTPATIENT
Start: 2019-01-24 | End: 2019-01-24

## 2019-01-24 RX ADMIN — Medication 200 MILLIGRAM(S): at 22:37

## 2019-01-24 NOTE — OB PROVIDER H&P - HISTORY OF PRESENT ILLNESS
37 yo  at 38w2d by LMP c/w first trim sonogram, CHTN on labetalol 100mg BID, sent from [facility] today for severely elevated BPs and decreased FM and abdominal pain. C/o lower abdominal pain especially when standing 9/10, feels lower abd cramping when sitting that is milder. Reports vaginal spotting in the AM, that resolved. Decreased FM since yesterday. Denies LOF. Reports new swelling in feet since yesterday and swelling in the hands that she's had for weeks. Denies headache, vision changes, CP, SOB. BP in clinic today 162/86 -> 165/81 repeat. + Epigastric tenderness and + incisional tenderness. BPP 8/8, breech presentation. Forgot her BP logs, but states that they are usually 140-150s/70-80s. NPO since 1330, instructed not to eat. Was previously taking aldomet 500mg BID but was switched at 35wks due to nation wide aldomet shortage to labetalol 100mg BID. Echo done 55-60% EF, EKG NSR, last EFW at 35wks 2846g (31%ile).     H/o C/S x 2, first in  for arrest of dilation. Second in  repeat. Scheduled for repeat C/S at 39wks.

## 2019-01-24 NOTE — OB RN INTRAOPERATIVE NOTE - NS_DURAMORPH_OBGYN_ALL_OB_DT
PULMONARY Medicine Consult HISTORY AND PHYSICAL    aTTENDING Physician    Patient is being seen at the request of Dr. Tina  Reason for consultation is dyspnea    HISTORY OF PRESENT ILLNESS    Lizbeth Macias is a 89 year old female who is complaining of dyspnea on exertion. Is currently to minimal activities she lives at home by herself and use to be very active and currently is very annoyed with her dyspnea. Her chest x-ray over the last few years has been showing prominent interstitial marking suggestive of pulmonary fibrosis. She has cough. No significant sputum production. No wheezing. She is lifetime nonsmoker.  The patient has atrial fibrillation and she was at some point on amiodarone. She believes she took it for a short period of time, it did not agree with her but does not remember specific side effects.  She has no pets.  She was prescribed Symbicort but it does not help. She has no history of asthma. She never had lung function test done. No fever or chills no chest pain no weight loss.    Current Outpatient Prescriptions   Medication Sig Dispense Refill   • budesonide/formoterol (SYMBICORT) 80-4.5 MCG/ACT inhaler Inhale 2 puffs into the lungs 2 times daily.     • sertraline (ZOLOFT) 50 MG tablet Take 1 tablet by mouth daily. With breakfast for depression and anxiety 30 tablet 2   • simvastatin (ZOCOR) 20 MG tablet Take 1 tablet by mouth nightly. For cholesterol. 90 tablet 0   • apixaban (ELIQUIS) 2.5 MG Tab Take 1 tablet by mouth every 12 hours. 60 tablet 12   • acetaminophen (TYLENOL) 500 MG tablet Take 500 mg by mouth every 6 hours as needed for Pain.     • CALCIUM CARBONATE-VITAMIN D 500-200 MG-UNIT PO TABS ONE TABLET TWO TIMES A DAY (Patient taking differently: ONE TABLET DAILY) 0 0   • MULTIVITAMINS CAPS   PO 1 TAB PO QD (Patient taking differently: 1 TAB BY MOUTH DAILY)  0   • VITAMIN C TABS 500 MG PO 1 TAB PO QD (Patient taking differently: 1 TAB BY MOUTH DAILY) 0 0     No current  facility-administered medications for this visit.          Current Outpatient Prescriptions   Medication Sig   • budesonide/formoterol (SYMBICORT) 80-4.5 MCG/ACT inhaler Inhale 2 puffs into the lungs 2 times daily.   • sertraline (ZOLOFT) 50 MG tablet Take 1 tablet by mouth daily. With breakfast for depression and anxiety   • simvastatin (ZOCOR) 20 MG tablet Take 1 tablet by mouth nightly. For cholesterol.   • apixaban (ELIQUIS) 2.5 MG Tab Take 1 tablet by mouth every 12 hours.   • acetaminophen (TYLENOL) 500 MG tablet Take 500 mg by mouth every 6 hours as needed for Pain.   • CALCIUM CARBONATE-VITAMIN D 500-200 MG-UNIT PO TABS ONE TABLET TWO TIMES A DAY (Patient taking differently: ONE TABLET DAILY)   • MULTIVITAMINS CAPS   PO 1 TAB PO QD (Patient taking differently: 1 TAB BY MOUTH DAILY)   • VITAMIN C TABS 500 MG PO 1 TAB PO QD (Patient taking differently: 1 TAB BY MOUTH DAILY)     No current facility-administered medications for this visit.          Past Medical History:   Diagnosis Date   • Abnormal mammogram, unspecified 02/02/06    right breast, 03/08/06 breast bx, path: benign fibrocystic changes without atypia.   • Atrial fibrillation (CMS/HCC) 6/01    Post-op atrial fibrillation.   • CORONARY ATHEROSCLER UNSPEC VESSEL 8/10/2001   • Herpes zoster without mention of complication 1993    Shingles - R upper back.   • Irritable bowel syndrome     Irritable Bowel Syndrome   • Mitral valve disorders(424.0) Since age 18    Severe mitral regurgitation and mitral valve prolapse.  Valve replacement 6-22-01.   • Osteoporosis, unspecified    • Other and unspecified hyperlipidemia    • Rheumatic fever without mention of heart involvement As a child    Told she had this as a child but is not certain.   • S/P AV mariela ablation 05/25/2017    Modification   • S/P mitral valve replacement 2001     Porcine valve          Past Surgical History:   Procedure Laterality Date   • BIOPSY OF UTERUS LINING  1991    Endometrial Biopsy  - negative   • CABG, VEIN, SINGLE  01    CABG one vessel bypass LIMA to the first diagonal.  Dr. William   • CARDIAC DUAL CHAMBER PACEMAKER PLACEMENT  2017    [employer]   • CATARACT EXTRACTION W/  INTRAOCULAR LENS IMPLANT  13    left   • CATARACT EXTRACTION W/  INTRAOCULAR LENS IMPLANT  13    right   • CLOSED RX DIST RAD ULNA FX MANIPUL  3/25/06    Dr. Cruz;right   • COLONOSCOPY DIAGNOSTIC  04    Dr Gale normal colonosocpy  f/u 5yrs   • ECHO HEART RESTING      Echocardiac, Resting, abnormal see report   • ECHOCARDIOGRAM  2016    EJF 68%, NSR, left atrial enlargement, see report   • EXCISE BREAST LES W XRAY MARKER  06    Dr. Arteaga rt brst wire localized bx, path: benign fibrocystic changes without atypia.  6 mo f/u rt mammo recommended.   • FLEXIBLE SIGMOIDOSCOPY DX  98    Flex Sig w/o Bx, to 30 cm - normal.  Dr. Finney   • LAPAROSCOPIC INCIS HERNIA REPR      hernia encasing bowel   • LEFT HEART CATH,PERCUTANEOUS  01    Cardiac Cath:  60 percent stenosis of the first diagonal.  Normal functioning L ventricle with 75 percent EF and 4 plus mitral regurgitation.   • MITRAL VALVE REPLACEMENT      Porcine valve   • REPLACEMENT OF MITRAL VALVE  01    Medtronic Owens II porcine valve.  Dr. William         Family History   Problem Relation Age of Onset   • Heart Father      MI age 47,    • Heart Brother      MI age 66   • Alcohol/Drug Brother       of alchol abuse   • Cancer Brother      Prostate cancer   • Lipids Sister      High cholesterol   • Psychiatry Son      Schizophrenia   • Heart Sister           Social History     Social History   • Marital status:      Spouse name: N/A   • Number of children: 3   • Years of education: N/A     Occupational History   • Homemaker      Social History Main Topics   • Smoking status: Former Smoker     Packs/day: 0.25     Years: 5.00     Types: Cigarettes     Quit date: 1966   • Smokeless  tobacco: Never Used      Comment: Was a minimal smoker in remote past   • Alcohol use 0.0 oz/week      Comment: rare   • Drug use: No   • Sexual activity: Not Currently     Other Topics Concern   • None     Social History Narrative   • None          ALLERGIES:   Allergen Reactions   • Opioid Analgesics NAUSEA     nausea   • Latex    • Adhesive RASH     tape,redness   • Sulfa Antibiotics NAUSEA     nausea         REVIEW OF SYSTEMS    Review of Systems  Constitutional: Negative for anorexia, chills, fevers, sweats and weight loss  Eyes: Positive for contacts/glasses  Ears, nose, mouth, throat, and face: Negative for hearing loss, hoarseness, nasal congestion, snoring, sore mouth, sore throat and postnasal drip and sinus congestion  Respiratory: As above  Cardiovascular: Negative for chest pain, lower extremity edema, near-syncope, palpitations and syncope  Gastrointestinal: Negative for abdominal pain, constipation, diarrhea, dysphagia, odynophagia and reflux symptoms  Genitourinary: Negative for dysuria, frequency, hesitancy and nocturia  Integument/Breasts: Negative for breast lump and nipple discharge  Hematologic/Lymphatic: Negative for easy bruising  Musculoskeletal: Negative for arthralgias, bone pain and stiff joints  Neurological: Negative for dizziness, gait problems, paresthesia and weakness  Behavioral/Psychiatric: Negative for anxiety and depression  Endocrine: Negative for fertility problems and temperature intolerance  Allergic/Immunologic: Negative for anaphylaxis, angioedema, hay fever and urticaria      All other Review of Systems negative.      ================================================================    PHYSICAL EXAM   Objective:    Visit Vitals  /74   Pulse 94   Resp 14   Wt 40.6 kg   SpO2 94%   BMI 18.73 kg/m²      PHYSICAL EXAM:  General: Alert, in no acute distress  Skin: Warm with normal turgor  Head: Atraumatic and normocephalic  Nose: no flaring, no discharge seen  Throat:  oropharynx is clear, no redness or exudate seen, good dental hygiene  Neck: Supple without lymphadenopathy, jugular venous distention, or thyromegaly. Trachea midline  Lymph Nodes: No palpable lymphadenopathy  Lungs: chest symmetric with normal A/P diameter, no chest deformities noted, normal respiratory rate and rhythm, no chest wall tenderness, diaphragmatic excursion normal, percussion normal, lungs clear to auscultation     Heart: Regular rhythm, no murmur present, PMI (point of maximum impulse) is not displaced  Abdomen: Soft, no guarding or masses, no organomegaly or CVA (costovertebral angle) tenderness  Extremities: No cyanosis, clubbing and No edema  Neurologic Exam: The patient is alert and oriented x3. The patient moves all 4 extremities with good strength and tone. The patient has a normal gait.  Normal mood and affect      IMAGING AND OTHER STUDIES    Chest x-ray: 9/30/17     IMPRESSION:   1. Chest radiograph again showing diffuse bilateral interstitial disease.  2. There has been no significant change from 5/26/17, except for a left  retrocardiac density currently appearing mildly decreased in size.  3. Mild cardiomegaly is again seen, along with evidence of a previous  mitral valve replacement and a stable cardiac pacemaker device.    Assessment/plan:    ASSESSMENT: (J84.9) Interstitial lung disease (CMS/HCC)  (primary encounter diagnosis)  Comment: This is a 89-year-old lady who complains about this exertion with any physical activity. It has been progressing over the years. She used to be a very active person and she, be any more. Chest x-ray for the lost a few years has been showing prominent interstitial markings suggestive of pulmonary fibrosis. She has kyphoscoliosis which could be contributing factor. I recommend high-resolution CT scan and lung function test for further evaluation. This was discussed with the patient. She is somewhat hesitant to do these testing because of her age. She is  advised that it really up to her how much workup she wants. Neither one of these 2 tests is invasive.  She is on Symbicort, the patient feels that it does not help. She has no history of asthma or significant smoking.  Plan: SERVICE TO PULMONARY TESTING, CT CHEST          (R06.09) ANDERSON (dyspnea on exertion)  Comment: Please see discussion above    (M41.20) Idiopathic scoliosis and kyphoscoliosis    Follow-up after lung function test and CT scan    Thank you very much for this consultation    Linda Young MD  Pulmonary & Critical Care Medicine  Pager: [phone]         24-Jan-2019 22:48

## 2019-01-24 NOTE — OB PROVIDER H&P - ATTENDING COMMENTS
Using    risks explained of c/s-Anesthesia, bleeding, transfusion, infection, injury to other organs-bowel/bladder, dvt/pe  all questions answered

## 2019-01-24 NOTE — OB PROVIDER H&P - NSHPPHYSICALEXAM_GEN_ALL_CORE
T(F): 98.2 (24 Jan 2019 17:28), Max: 98.2 (24 Jan 2019 17:18)  HR: 75 (24 Jan 2019 17:37) (75 - 81)  BP: 140/91 -> 168/85 -> 140/75  RR: 18 (24 Jan 2019 17:28) (18 - 18)  Gen: NAD, A&Ox3  Abd: Gravid, obese, soft, NT, palpable ctxm, epigastric tenderness and lower abdominal pain   SVE: , breech by sono, intact  Ext: 1+ edema, no calf tenderness  EFM: 150/mod/+accels  [place]: Q3-4m

## 2019-01-24 NOTE — PRE-ANESTHESIA EVALUATION ADULT - NSANTHOSAYNRD_GEN_A_CORE
No. MARGOT screening performed.  STOP BANG Legend: 0-2 = LOW Risk; 3-4 = INTERMEDIATE Risk; 5-8 = HIGH Risk

## 2019-01-24 NOTE — PRE-ANESTHESIA EVALUATION ADULT - NSANTHADDINFOFT_GEN_ALL_CORE
Pt evaluated, consulted and consented for Spinal/Epidural/General Anesthesia.  Pt's preferred Language is Mauritian.   phone used

## 2019-01-25 DIAGNOSIS — Z02.9 ENCOUNTER FOR ADMINISTRATIVE EXAMINATIONS, UNSPECIFIED: ICD-10-CM

## 2019-01-25 LAB
ALBUMIN SERPL ELPH-MCNC: 3 G/DL — LOW (ref 3.5–5.2)
ALP SERPL-CCNC: 109 U/L — SIGNIFICANT CHANGE UP (ref 30–115)
ALT FLD-CCNC: 9 U/L — SIGNIFICANT CHANGE UP (ref 0–41)
ANION GAP SERPL CALC-SCNC: 16 MMOL/L — HIGH (ref 7–14)
AST SERPL-CCNC: 21 U/L — SIGNIFICANT CHANGE UP (ref 0–41)
BILIRUB SERPL-MCNC: 0.2 MG/DL — SIGNIFICANT CHANGE UP (ref 0.2–1.2)
BUN SERPL-MCNC: 6 MG/DL — LOW (ref 10–20)
CALCIUM SERPL-MCNC: 8.4 MG/DL — LOW (ref 8.5–10.1)
CHLORIDE SERPL-SCNC: 95 MMOL/L — LOW (ref 98–110)
CO2 SERPL-SCNC: 23 MMOL/L — SIGNIFICANT CHANGE UP (ref 17–32)
CREAT SERPL-MCNC: 0.5 MG/DL — LOW (ref 0.7–1.5)
GLUCOSE SERPL-MCNC: 86 MG/DL — SIGNIFICANT CHANGE UP (ref 70–99)
HCT VFR BLD CALC: 36.3 % — LOW (ref 37–47)
HGB BLD-MCNC: 12.2 G/DL — SIGNIFICANT CHANGE UP (ref 12–16)
LDH SERPL L TO P-CCNC: 199 — SIGNIFICANT CHANGE UP (ref 50–242)
MAGNESIUM SERPL-MCNC: 3.5 MG/DL — CRITICAL HIGH (ref 1.8–2.4)
MCHC RBC-ENTMCNC: 28.4 PG — SIGNIFICANT CHANGE UP (ref 27–31)
MCHC RBC-ENTMCNC: 33.6 G/DL — SIGNIFICANT CHANGE UP (ref 32–37)
MCV RBC AUTO: 84.4 FL — SIGNIFICANT CHANGE UP (ref 81–99)
NRBC # BLD: 0 /100 WBCS — SIGNIFICANT CHANGE UP (ref 0–0)
PLATELET # BLD AUTO: 183 K/UL — SIGNIFICANT CHANGE UP (ref 130–400)
POTASSIUM SERPL-MCNC: 4.3 MMOL/L — SIGNIFICANT CHANGE UP (ref 3.5–5)
POTASSIUM SERPL-SCNC: 4.3 MMOL/L — SIGNIFICANT CHANGE UP (ref 3.5–5)
PROT SERPL-MCNC: 5.6 G/DL — LOW (ref 6–8)
RBC # BLD: 4.3 M/UL — SIGNIFICANT CHANGE UP (ref 4.2–5.4)
RBC # FLD: 15.2 % — HIGH (ref 11.5–14.5)
SODIUM SERPL-SCNC: 134 MMOL/L — LOW (ref 135–146)
URATE SERPL-MCNC: 4.2 MG/DL — SIGNIFICANT CHANGE UP (ref 2.5–7)
WBC # BLD: 19.02 K/UL — HIGH (ref 4.8–10.8)
WBC # FLD AUTO: 19.02 K/UL — HIGH (ref 4.8–10.8)

## 2019-01-25 RX ORDER — LABETALOL HCL 100 MG
100 TABLET ORAL
Qty: 0 | Refills: 0 | Status: DISCONTINUED | OUTPATIENT
Start: 2019-01-25 | End: 2019-01-26

## 2019-01-25 RX ORDER — LABETALOL HCL 100 MG
100 TABLET ORAL ONCE
Qty: 0 | Refills: 0 | Status: COMPLETED | OUTPATIENT
Start: 2019-01-25 | End: 2019-01-25

## 2019-01-25 RX ORDER — DOCUSATE SODIUM 100 MG
100 CAPSULE ORAL
Qty: 0 | Refills: 0 | Status: DISCONTINUED | OUTPATIENT
Start: 2019-01-25 | End: 2019-01-30

## 2019-01-25 RX ORDER — IBUPROFEN 200 MG
600 TABLET ORAL EVERY 6 HOURS
Qty: 0 | Refills: 0 | Status: DISCONTINUED | OUTPATIENT
Start: 2019-01-25 | End: 2019-01-25

## 2019-01-25 RX ORDER — OXYTOCIN 10 UNIT/ML
125 VIAL (ML) INJECTION
Qty: 20 | Refills: 0 | Status: DISCONTINUED | OUTPATIENT
Start: 2019-01-25 | End: 2019-01-25

## 2019-01-25 RX ORDER — IBUPROFEN 200 MG
600 TABLET ORAL EVERY 6 HOURS
Qty: 0 | Refills: 0 | Status: DISCONTINUED | OUTPATIENT
Start: 2019-01-25 | End: 2019-01-30

## 2019-01-25 RX ORDER — MAGNESIUM SULFATE 500 MG/ML
3 VIAL (ML) INJECTION
Qty: 40 | Refills: 0 | Status: DISCONTINUED | OUTPATIENT
Start: 2019-01-25 | End: 2019-01-25

## 2019-01-25 RX ORDER — LABETALOL HCL 100 MG
20 TABLET ORAL ONCE
Qty: 0 | Refills: 0 | Status: COMPLETED | OUTPATIENT
Start: 2019-01-25 | End: 2019-01-25

## 2019-01-25 RX ORDER — KETOROLAC TROMETHAMINE 30 MG/ML
30 SYRINGE (ML) INJECTION ONCE
Qty: 0 | Refills: 0 | Status: DISCONTINUED | OUTPATIENT
Start: 2019-01-25 | End: 2019-01-25

## 2019-01-25 RX ORDER — ACETAMINOPHEN 500 MG
650 TABLET ORAL EVERY 6 HOURS
Qty: 0 | Refills: 0 | Status: DISCONTINUED | OUTPATIENT
Start: 2019-01-25 | End: 2019-01-30

## 2019-01-25 RX ORDER — OXYCODONE AND ACETAMINOPHEN 5; 325 MG/1; MG/1
1 TABLET ORAL EVERY 4 HOURS
Qty: 0 | Refills: 0 | Status: DISCONTINUED | OUTPATIENT
Start: 2019-01-25 | End: 2019-01-30

## 2019-01-25 RX ORDER — GLYCERIN ADULT
1 SUPPOSITORY, RECTAL RECTAL AT BEDTIME
Qty: 0 | Refills: 0 | Status: DISCONTINUED | OUTPATIENT
Start: 2019-01-25 | End: 2019-01-30

## 2019-01-25 RX ORDER — ENOXAPARIN SODIUM 100 MG/ML
40 INJECTION SUBCUTANEOUS DAILY
Qty: 0 | Refills: 0 | Status: DISCONTINUED | OUTPATIENT
Start: 2019-01-25 | End: 2019-01-30

## 2019-01-25 RX ORDER — HYDRALAZINE HCL 50 MG
10 TABLET ORAL ONCE
Qty: 0 | Refills: 0 | Status: COMPLETED | OUTPATIENT
Start: 2019-01-25 | End: 2019-01-25

## 2019-01-25 RX ORDER — OXYCODONE AND ACETAMINOPHEN 5; 325 MG/1; MG/1
2 TABLET ORAL EVERY 6 HOURS
Qty: 0 | Refills: 0 | Status: DISCONTINUED | OUTPATIENT
Start: 2019-01-25 | End: 2019-01-30

## 2019-01-25 RX ORDER — SIMETHICONE 80 MG/1
80 TABLET, CHEWABLE ORAL EVERY 6 HOURS
Qty: 0 | Refills: 0 | Status: DISCONTINUED | OUTPATIENT
Start: 2019-01-25 | End: 2019-01-30

## 2019-01-25 RX ORDER — CEFAZOLIN SODIUM 1 G
2000 VIAL (EA) INJECTION EVERY 8 HOURS
Qty: 0 | Refills: 0 | Status: COMPLETED | OUTPATIENT
Start: 2019-01-25 | End: 2019-01-25

## 2019-01-25 RX ORDER — LANOLIN
1 OINTMENT (GRAM) TOPICAL
Qty: 0 | Refills: 0 | Status: DISCONTINUED | OUTPATIENT
Start: 2019-01-25 | End: 2019-01-30

## 2019-01-25 RX ORDER — LABETALOL HCL 100 MG
20 TABLET ORAL ONCE
Qty: 0 | Refills: 0 | Status: DISCONTINUED | OUTPATIENT
Start: 2019-01-25 | End: 2019-01-25

## 2019-01-25 RX ORDER — DIPHENHYDRAMINE HCL 50 MG
25 CAPSULE ORAL EVERY 6 HOURS
Qty: 0 | Refills: 0 | Status: DISCONTINUED | OUTPATIENT
Start: 2019-01-25 | End: 2019-01-30

## 2019-01-25 RX ORDER — SODIUM CHLORIDE 9 MG/ML
1000 INJECTION, SOLUTION INTRAVENOUS
Qty: 0 | Refills: 0 | Status: DISCONTINUED | OUTPATIENT
Start: 2019-01-25 | End: 2019-01-26

## 2019-01-25 RX ORDER — FERROUS SULFATE 325(65) MG
325 TABLET ORAL DAILY
Qty: 0 | Refills: 0 | Status: DISCONTINUED | OUTPATIENT
Start: 2019-01-25 | End: 2019-01-30

## 2019-01-25 RX ADMIN — Medication 10 MILLIGRAM(S): at 02:46

## 2019-01-25 RX ADMIN — OXYCODONE AND ACETAMINOPHEN 2 TABLET(S): 5; 325 TABLET ORAL at 23:01

## 2019-01-25 RX ADMIN — Medication 650 MILLIGRAM(S): at 12:21

## 2019-01-25 RX ADMIN — Medication 300 GRAM(S): at 01:21

## 2019-01-25 RX ADMIN — Medication 100 MILLIGRAM(S): at 03:11

## 2019-01-25 RX ADMIN — Medication 100 MILLIGRAM(S): at 14:20

## 2019-01-25 RX ADMIN — Medication 20 MILLIGRAM(S): at 01:50

## 2019-01-25 RX ADMIN — OXYCODONE AND ACETAMINOPHEN 2 TABLET(S): 5; 325 TABLET ORAL at 22:04

## 2019-01-25 RX ADMIN — Medication 1 TABLET(S): at 12:11

## 2019-01-25 RX ADMIN — Medication 650 MILLIGRAM(S): at 13:00

## 2019-01-25 RX ADMIN — Medication 650 MILLIGRAM(S): at 18:03

## 2019-01-25 RX ADMIN — SIMETHICONE 80 MILLIGRAM(S): 80 TABLET, CHEWABLE ORAL at 18:00

## 2019-01-25 RX ADMIN — Medication 50 GM/HR: at 01:43

## 2019-01-25 RX ADMIN — Medication 10 MILLIGRAM(S): at 02:19

## 2019-01-25 RX ADMIN — ENOXAPARIN SODIUM 40 MILLIGRAM(S): 100 INJECTION SUBCUTANEOUS at 12:54

## 2019-01-25 RX ADMIN — SIMETHICONE 80 MILLIGRAM(S): 80 TABLET, CHEWABLE ORAL at 12:12

## 2019-01-25 RX ADMIN — Medication 100 MILLIGRAM(S): at 06:29

## 2019-01-25 RX ADMIN — Medication 30 MILLIGRAM(S): at 23:25

## 2019-01-25 RX ADMIN — Medication 100 MILLIGRAM(S): at 17:56

## 2019-01-25 RX ADMIN — Medication 325 MILLIGRAM(S): at 12:12

## 2019-01-25 NOTE — PROGRESS NOTE ADULT - ASSESSMENT
37 y/o P3 s/p repeat  #3 with vertical skin incision, pod1, CHTN now with superimposed preeclampsia, s/p IV pushes of labetalol and hydralazine, now with blood pressures in the non-severe range, on magnesium for seizure prophylaxis, with subtherapeutic magnesium levels, doing well.     - Raise magnesium to 3g/h  - f/u BPs  - labetalol 100 mg BID  - strict I/Os  - neuro checks q2h  - pain management PRN  - routine postop care  - incentive spirometer encouraged  - lovenox at 1300    Dr. Costello aware. Dr. Cm to be made aware

## 2019-01-25 NOTE — PROGRESS NOTE ADULT - SUBJECTIVE AND OBJECTIVE BOX
PGY1 Mag Note    Subjective:   Pt evaluated at bedside for magnesium check. She denies visual disturbances, headache and right upper quadrant pain. Also denies nausea/vomiting/chest pain/epigastric pain/shortness of breath. Pain well controlled.     Objective:   Vital signs: T(F): 97.7 (01-25-19 @ 00:58), Max: 97.7 (01-25-19 @ 00:58)  HR: 86 (01-25-19 @ 08:33) (47 - 96)  BP: 134/62 (01-25-19 @ 08:21) (68/39 - 173/80)  SpO2: 94% (01-25-19 @ 08:33) (86% - 98%)  BP:  (68/39 - 173/80)    01-24-19 @ 07:01  -  01-25-19 @ 07:00  -------------------------------------------------------- UO 350cc/h 8668-6254 adequate 65cc/h  IN: 850 mL / OUT: 1595 mL / NET: -745 mL        Gen: NAD, AAOx3  CV: S1S2, RRR, no M/R/G  Pulm: CTAB, no rhonchi or rales or wheezing  Abd: soft, gravid, nontender, no rebound or guarding, no epigastric tenderness  : [place]   Ext: +1 pitting upper and lower extremity edema. per patient increased than during pregnancy  Neuro: +2 reflexes UE b/l      Medications:  ceFAZolin   IVPB: 100 (01-25-19 @ 06:29)  ceFAZolin   IVPB: 200 (01-24-19 @ 22:37)  hydrALAZINE Injectable: 10 (01-25-19 @ 02:19)  hydrALAZINE Injectable: 10 (01-25-19 @ 02:46)  labetalol: 100 (01-25-19 @ 03:11)  labetalol Injectable: 20 (01-25-19 @ 01:50)  magnesium sulfate  IVPB: 300 (01-25-19 @ 01:21)  magnesium sulfate Infusion: 50 (01-24-19 @ 23:52)        Labs:                        12.7   11.32 )-----------( 189      ( 24 Jan 2019 19:00 )             38.7     01-24    139  |  99  |  10  ----------------------------<  63<L>  4.2   |  19  |  0.6<L>    Ca    9.9      24 Jan 2019 19:00    TPro  6.9  /  Alb  3.6  /  TBili  0.2  /  DBili  x   /  AST  21  /  ALT  11  /  AlkPhos  136<H>  01-24    Uric Acid, Serum: 4.0 mg/dL (01-24-19 @ 19:00) PGY1 Mag Note    Subjective:   Pt evaluated at bedside for magnesium check. She denies visual disturbances, headache and right upper quadrant pain. Also denies nausea/vomiting/chest pain/epigastric pain/shortness of breath. Pain well controlled.     Objective:   Vital signs: T(F): 97.7 (01-25-19 @ 00:58), Max: 97.7 (01-25-19 @ 00:58)  HR: 86 (01-25-19 @ 08:33) (47 - 96)  BP: 134/62 (01-25-19 @ 08:21) (68/39 - 173/80)  SpO2: 94% (01-25-19 @ 08:33) (86% - 98%)  BP:  (68/39 - 173/80)    01-24-19 @ 07:01  -  01-25-19 @ 07:00  -------------------------------------------------------- UO 350cc/h 6975-3348 adequate 65cc/h  IN: 850 mL / OUT: 1595 mL / NET: -745 mL        Gen: NAD, AAOx3  CV: S1S2, RRR, no M/R/G  Pulm: CTAB, no rhonchi or rales or wheezing  Abd: soft, gravid, nontender, no rebound or guarding, mild right upper quadrant tenderness on deep palpation  : [place]   Ext: +1 pitting upper and lower extremity edema. per patient increased than during pregnancy  Neuro: +2 reflexes UE b/l      Medications:  ceFAZolin   IVPB: 100 (01-25-19 @ 06:29)  ceFAZolin   IVPB: 200 (01-24-19 @ 22:37)  hydrALAZINE Injectable: 10 (01-25-19 @ 02:19)  hydrALAZINE Injectable: 10 (01-25-19 @ 02:46)  labetalol: 100 (01-25-19 @ 03:11)  labetalol Injectable: 20 (01-25-19 @ 01:50)  magnesium sulfate  IVPB: 300 (01-25-19 @ 01:21)  magnesium sulfate Infusion: 50 (01-24-19 @ 23:52)        Labs:                        12.7   11.32 )-----------( 189      ( 24 Jan 2019 19:00 )             38.7     01-24    139  |  99  |  10  ----------------------------<  63<L>  4.2   |  19  |  0.6<L>    Ca    9.9      24 Jan 2019 19:00    TPro  6.9  /  Alb  3.6  /  TBili  0.2  /  DBili  x   /  AST  21  /  ALT  11  /  AlkPhos  136<H>  01-24    Uric Acid, Serum: 4.0 mg/dL (01-24-19 @ 19:00) PGY1 Mag Note    Subjective:   Pt evaluated at bedside for magnesium check. She denies visual disturbances, headache and right upper quadrant pain. Also denies nausea/vomiting/chest pain/epigastric pain/shortness of breath. Pain well controlled.     Objective:   Vital signs: T(F): 97.7 (01-25-19 @ 00:58), Max: 97.7 (01-25-19 @ 00:58)  HR: 86 (01-25-19 @ 08:33) (47 - 96)  BP: 134/62 (01-25-19 @ 08:21) (68/39 - 173/80)  SpO2: 94% (01-25-19 @ 08:33) (86% - 98%)      01-24-19 @ 07:01  -  01-25-19 @ 07:00  -------------------------------------------------------- UO 350cc/h 3784-4573 adequate 65cc/h  IN: 850 mL / OUT: 1595 mL / NET: -745 mL        Gen: NAD, AAOx3  CV: S1S2, RRR, no M/R/G  Pulm: CTAB, no rhonchi or rales or wheezing  Abd: soft, nontender, no rebound or guarding, mild right upper quadrant tenderness on deep palpation. Fundus firm below umbilicus  : [place]   Ext: +1 pitting upper and lower extremity edema. per patient increased than during pregnancy  Neuro: +2 reflexes UE b/l      Medications:  ceFAZolin   IVPB: 100 (01-25-19 @ 06:29)  ceFAZolin   IVPB: 200 (01-24-19 @ 22:37)  hydrALAZINE Injectable: 10 (01-25-19 @ 02:19)  hydrALAZINE Injectable: 10 (01-25-19 @ 02:46)  labetalol: 100 (01-25-19 @ 03:11)  labetalol Injectable: 20 (01-25-19 @ 01:50)  magnesium sulfate  IVPB: 300 (01-25-19 @ 01:21)  magnesium sulfate Infusion: 50 (01-24-19 @ 23:52)        Labs:                        12.7   11.32 )-----------( 189      ( 24 Jan 2019 19:00 )             38.7     01-24    139  |  99  |  10  ----------------------------<  63<L>  4.2   |  19  |  0.6<L>    Ca    9.9      24 Jan 2019 19:00    TPro  6.9  /  Alb  3.6  /  TBili  0.2  /  DBili  x   /  AST  21  /  ALT  11  /  AlkPhos  136<H>  01-24    Uric Acid, Serum: 4.0 mg/dL (01-24-19 @ 19:00)

## 2019-01-25 NOTE — BRIEF OPERATIVE NOTE - POST-OP DX
Adhesion of abdominal wall  2019    Larry Luciano  Chronic hypertension  2019    Larry Luciano  Pre-eclampsia in third trimester  2019    Larry Luciano  Previous  section  2019    Larry Luciano

## 2019-01-25 NOTE — PROGRESS NOTE ADULT - SUBJECTIVE AND OBJECTIVE BOX
PGY 4 Note    s/p c/s #3 via vertical skin incision, sheldon dressing in place, patient now cHTN with superimposed pre-eclampsia. Magnesium started.   IV labetalol administered dur to persistently elevated BPs of 168/78 and 170/83. Labetalol 20 mg IV administered.     Vital Signs Last 24 Hrs  T(C): 36.5 (25 Jan 2019 00:58), Max: 36.8 (24 Jan 2019 17:18)  T(F): 97.7 (25 Jan 2019 00:58), Max: 98.2 (24 Jan 2019 17:18)  HR: 70 (25 Jan 2019 02:56) (47 - 86)  BP: 166/91 (25 Jan 2019 02:42) (109/66 - 173/80)  RR: 16 (24 Jan 2019 18:56) (16 - 18)  SpO2: 95% (25 Jan 2019 02:56) (86% - 98%)    Will continue to observe. If Patient HR is below HR 65, will administered IV hydralazine instead.     Will inform Dr. Cm

## 2019-01-25 NOTE — BRIEF OPERATIVE NOTE - OPERATION/FINDINGS
normal uterus, normal tubes and ovaries b/l, adhesions of bladder to lower segment, mesh and bowel to lower abdominal wall

## 2019-01-25 NOTE — PROGRESS NOTE ADULT - SUBJECTIVE AND OBJECTIVE BOX
PGY1 Mag Note    Subjective:   Pt evaluated at bedside for magnesium check. She denies visual disturbances, headache and right upper quadrant pain. Also denies nausea/vomiting/chest pain/epigastric pain/shortness of breath. Pain well controlled.     Objective:   Vital signs: T(F): 97.9 (01-25-19 @ 07:30), Max: 97.9 (01-25-19 @ 07:30)  HR: 85 (01-25-19 @ 14:26) (60 - 96)  BP: 116/54 (01-25-19 @ 14:17) (68/39 - 166/91)  SpO2: 94% (01-25-19 @ 14:26) (89% - 98%)        Gen: NAD, AAOx3  CV: S1S2, RRR, no M/R/G  Pulm: CTAB, no rhonchi or rales or wheezing  Abd: BS+, soft, nontender, no rebound or guarding, no RUQ or epigastric tenderness. CHANG dressing in place. Fundus firm below umbilicus  : Pinto UO [id number] 280cc/h  Ext: +1 LE and UE edema  Neuro: +2 UE reflexes b/l        Medications:  acetaminophen   Tablet ..: 650 (01-25-19 @ 12:21)  ceFAZolin   IVPB: 100 (01-25-19 @ 14:20),  100 (01-25-19 @ 06:29)  ceFAZolin   IVPB: 200 (01-24-19 @ 22:37)  enoxaparin Injectable: 40 (01-25-19 @ 12:54)  ferrous    sulfate: 325 (01-25-19 @ 12:12)  hydrALAZINE Injectable: 10 (01-25-19 @ 02:46)  hydrALAZINE Injectable: 10 (01-25-19 @ 02:19)  labetalol: 100 (01-25-19 @ 03:11)  labetalol Injectable: 20 (01-25-19 @ 01:50)  magnesium sulfate  IVPB: 300 (01-25-19 @ 01:21)  magnesium sulfate Infusion: 50 (01-24-19 @ 23:52)  prenatal multivitamin: 1 (01-25-19 @ 12:11)  simethicone: 80 (01-25-19 @ 12:12)        Labs:                        12.2   19.02 )-----------( 183      ( 25 Jan 2019 09:30 )             36.3     01-25    134<L>  |  95<L>  |  6<L>  ----------------------------<  86  4.3   |  23  |  0.5<L>    Ca    8.4<L>      25 Jan 2019 09:30  Mg     3.5     01-25    TPro  5.6<L>  /  Alb  3.0<L>  /  TBili  0.2  /  DBili  x   /  AST  21  /  ALT  9   /  AlkPhos  109  01-25    Magnesium, Serum: 3.5 mg/dL (01-25-19 @ 09:30)  Uric Acid, Serum: 4.2 mg/dL (01-25-19 @ 09:30)  Uric Acid, Serum: 4.0 mg/dL (01-24-19 @ 19:00) PGY1 Mag Note    Subjective:   Pt evaluated at bedside for magnesium check. She denies visual disturbances, headache and right upper quadrant pain. Also denies nausea/vomiting/chest pain/epigastric pain/shortness of breath. Pain well controlled.     Objective:   Vital signs: T(F): 97.9 (01-25-19 @ 07:30), Max: 97.9 (01-25-19 @ 07:30)  HR: 85 (01-25-19 @ 14:26) (60 - 96)  BP: 116/54 (01-25-19 @ 14:17) (68/39 - 166/91)  SpO2: 94% (01-25-19 @ 14:26) (89% - 98%)        Gen: NAD, AAOx3  CV: S1S2, RRR, no M/R/G  Pulm: CTAB, no rhonchi or rales or wheezing  Abd: BS+, soft, nontender, no rebound or guarding, no RUQ or epigastric tenderness. CHANG dressing in place. Fundus firm above umbilicus  : Pinto UO [id number] 280cc/h  Ext: +1 LE and UE edema  Neuro: +2 UE reflexes b/l        Medications:  acetaminophen   Tablet ..: 650 (01-25-19 @ 12:21)  ceFAZolin   IVPB: 100 (01-25-19 @ 14:20),  100 (01-25-19 @ 06:29)  ceFAZolin   IVPB: 200 (01-24-19 @ 22:37)  enoxaparin Injectable: 40 (01-25-19 @ 12:54)  ferrous    sulfate: 325 (01-25-19 @ 12:12)  hydrALAZINE Injectable: 10 (01-25-19 @ 02:46)  hydrALAZINE Injectable: 10 (01-25-19 @ 02:19)  labetalol: 100 (01-25-19 @ 03:11)  labetalol Injectable: 20 (01-25-19 @ 01:50)  magnesium sulfate  IVPB: 300 (01-25-19 @ 01:21)  magnesium sulfate Infusion: 50 (01-24-19 @ 23:52)  prenatal multivitamin: 1 (01-25-19 @ 12:11)  simethicone: 80 (01-25-19 @ 12:12)        Labs:                        12.2   19.02 )-----------( 183      ( 25 Jan 2019 09:30 )             36.3     01-25    134<L>  |  95<L>  |  6<L>  ----------------------------<  86  4.3   |  23  |  0.5<L>    Ca    8.4<L>      25 Jan 2019 09:30  Mg     3.5     01-25    TPro  5.6<L>  /  Alb  3.0<L>  /  TBili  0.2  /  DBili  x   /  AST  21  /  ALT  9   /  AlkPhos  109  01-25    Magnesium, Serum: 3.5 mg/dL (01-25-19 @ 09:30)  Uric Acid, Serum: 4.2 mg/dL (01-25-19 @ 09:30)  Uric Acid, Serum: 4.0 mg/dL (01-24-19 @ 19:00)

## 2019-01-25 NOTE — PROGRESS NOTE ADULT - ASSESSMENT
37 y/o P3 s/p repeat  #3, vertical skin incision, CHTN now with superimposed preeclampsia with BPs in severe range s/p IV pushes of labetalol and hydralazine, on magnesium for seizure prophylaxis, doing well.   - f/u BPs  - labetalol 100 mg BID  - strict I/Os  - neuro checks q2h  - preeclamptic labs and magnesium @0700  - pain management PRN  - routine postop care  - incentive spirometer encouraged  - lovenox at 1300    will inform Dr. Cm

## 2019-01-25 NOTE — PROGRESS NOTE ADULT - SUBJECTIVE AND OBJECTIVE BOX
PGY 4 Note    [place] #[id number]    Subjective: s/p repeat  section via vertical skin incision with transverse uterine incision, staples on incision, CHANG dressing in place. Significant history of cHTN, now with superimposed pre-eclampsia, started on Magnesium with multiple anti-hypertensive medication IV pushes, now BPs normalized. Pt evaluated at bedside for magnesium check. She denies visual disturbances, headache and right upper quadrant pain. Also denies nausea/vomiting/chest pain/epigastric pain/shortness of breath. Pain well controlled.     Objective:   Vital signs: T(F): 97.7 (19 @ 00:58), Max: 98.2 (19 @ 17:28)  HR: 85 (19 @ 05:25) (47 - 93)  BP: 132/60 (19 @ 05:25) (85/49 - 173/80)  RR: 16 (19 @ 18:56) (16 - 18)  SpO2: 94% (19 @ 05:21) (86% - 98%)    19 @ 07:  -  19 @ 05:26  --------------------------------------------------------  IN: 475 mL / OUT: 1020 mL / NET: -545 mL      Gen: NAD, AAOx3  CV: S1S2, RRR, no M/R/G  Pulm: CTAB, no rhonchi or rales or wheezing  Abd: soft, gravid, nontender, no rebound or guarding, no epigastric tenderness; fundus below umbilicus (firm and nontender)  Incision: CHANG in place, clean and dry  : Pinto, clear urine output  Ext: SCDs in place, no lower extremity tenderness  Neuro: 1+ Upper extremity reflexes, 4/5 in strength    Medications:  ceFAZolin   IVPB: 200 (19 @ 22:37)  hydrALAZINE Injectable: 10 (19 @ 02:19)  hydrALAZINE Injectable: 10 (19 @ 02:46)  labetalol: 100 (19 @ 03:11)  labetalol Injectable: 20 (19 @ 01:50)  magnesium sulfate  IVPB: 300 (19 @ 01:21)  magnesium sulfate Infusion: 50 (19 @ 23:52)    Labs:                        12.7   11.32 )-----------( 189      ( 2019 19:00 )             38.7         139  |  99  |  10  ----------------------------<  63<L>  4.2   |  19  |  0.6<L>    Ca    9.9      2019 19:00    TPro  6.9  /  Alb  3.6  /  TBili  0.2  /  DBili  x   /  AST  21  /  ALT  11  /  AlkPhos  136<H>      Uric Acid, Serum: 4.0 mg/dL (19 @ 19:00)

## 2019-01-25 NOTE — PROGRESS NOTE ADULT - SUBJECTIVE AND OBJECTIVE BOX
PGY1 Note    S: Pt evaluated at bedside for magnesium check. She denies visual disturbances, headache, right upper quadrant/epigastric pain, chest pain, N/V and SOB. Also denies flushing/palpitations. No flatus, not yet ambulating, tolerates clear liquid diet.     O:  T(C): 37 (19 @ 14:36), Max: 37 (19 @ 14:33)  HR: 75 (19 @ 21:21) (72 - 92)  BP: 99/51 (19 @ 21:16) (80/48 - 140/64)  RR: 16 (19 @ 16:10) (16 - 16)  SpO2: 95% (19 @ 21:21) (89% - 96%)    Gen: NAD, AAOx3  CV: RRR, no M/R/G  Pulm: CTAB, no R/R/W  Abd: soft, nontender, no rebound or guarding, no epigastric and RUQ tenderness, liver nonpalpable, +BS, fundus ___, dressing in place, dry, clean and intact  : Pinto in place, lochia rubra minimal and non foul smell   Ext: +1 edema bilaterally, SCDs in place  Neuro: UE reflexes 2+ bilaterally   Urine output: 250cc/hr ([id number]) - adequate as min 65cc/hr     Medication:  acetaminophen   Tablet .. 650 milliGRAM(s) Oral every 6 hours  diphenhydrAMINE 25 milliGRAM(s) Oral every 6 hours PRN  docusate sodium 100 milliGRAM(s) Oral two times a day PRN  enoxaparin Injectable 40 milliGRAM(s) SubCutaneous daily  ferrous    sulfate 325 milliGRAM(s) Oral daily  glycerin Suppository - Adult 1 Suppository(s) Rectal at bedtime PRN  ibuprofen  Tablet. 600 milliGRAM(s) Oral every 6 hours PRN  labetalol 100 milliGRAM(s) Oral two times a day  lactated ringers. 1000 milliLiter(s) IV Continuous <Continuous>  lanolin Ointment 1 Application(s) Topical every 3 hours PRN  magnesium sulfate Infusion 3 Gm/Hr IV Continuous <Continuous>  ondansetron Injectable 4 milliGRAM(s) IV Push every 6 hours PRN  oxyCODONE    5 mG/acetaminophen 325 mG 1 Tablet(s) Oral every 4 hours PRN  oxyCODONE    5 mG/acetaminophen 325 mG 2 Tablet(s) Oral every 6 hours PRN  prenatal multivitamin 1 Tablet(s) Oral daily  simethicone 80 milliGRAM(s) Chew every 6 hours    No Known Allergies    Laboratory results:                        12.2   19.02 )-----------( 183      ( 2019 09:30 )             36.3         134<L>  |  95<L>  |  6<L>  ----------------------------<  86  4.3   |  23  |  0.5<L>    Ca    8.4<L>      2019 09:30  Mg     3.5         TPro  5.6<L>  /  Alb  3.0<L>  /  TBili  0.2  /  DBili  x   /  AST  21  /  ALT  9   /  AlkPhos  109      Magnesium, Serum: 3.5 mg/dL (19 @ 09:30)    Urine protein/Cr ratio 0.4    A/P   37 y/o P3 s/p repeat  #3 with vertical skin incision, POD#1, cHTN now with superimposed preeclampsia, s/p IV pushes of labetalol and hydralazine, now with blood pressures in the non-severe range, on magnesium for seizure prophylaxis, doing well,    - Continue with magnesium till 2330 PM  - f/u BPs  - labetalol 100 mg BID  - strict I/Os  - neuro checks q2h  - pain management PRN  - routine postop care  - incentive spirometer encouraged  - lovenox for dvt ppx    Dr. Licona and Dr. Delacruz to be made aware.

## 2019-01-25 NOTE — PROGRESS NOTE ADULT - ASSESSMENT
39 y/o P3 s/p repeat  #3 with vertical skin incision, pod1, CHTN now with superimposed preeclampsia, s/p IV pushes of labetalol and hydralazine, now with blood pressures in the non-severe range, on magnesium for seizure prophylaxis, doing well.     - Continue with magnesium for seizure ppx  - f/u BPs  - labetalol 100 mg BID  - strict I/Os  - neuro checks q2h  - pain management PRN  - routine postop care  - incentive spirometer encouraged  - lovenox for dvt ppx    Dr. Costello aware. Savanah chang

## 2019-01-25 NOTE — BRIEF OPERATIVE NOTE - PRE-OP DX
Chronic hypertension affecting pregnancy  2019    Active  LesterLarry waterman  Pre-eclampsia in third trimester  2019    Active  Larry Batista  Previous  section  2019    Active  Larry Batista

## 2019-01-25 NOTE — PROGRESS NOTE ADULT - ASSESSMENT
37 y/o P3 at 38w2d ,s/p repeat  #3, CHTN on labetalol 100mg BID, with superimposed preeclampsia with BPs in severe range s/p IV pushes of labetalol and hydralazine, on magnesium for seizure prophylaxis, doing well.   - f/u BPs  - labetalol 100 mg BID  - strict I/Os  - neuro checks q2h  - preeclamptic labs and magnesium @0700  - pain management PRN  - routine postop care    Dr. Licona and Dr. Cm aware.

## 2019-01-25 NOTE — PROGRESS NOTE ADULT - SUBJECTIVE AND OBJECTIVE BOX
PGY1 Mag Note    Subjective:   Pt evaluated at bedside for magnesium check. She denies visual disturbances, headache and right upper quadrant pain. Also denies nausea/vomiting/chest pain/epigastric pain/shortness of breath. Pain well controlled.     Objective:   Vital signs: T(F): 97.7 (01-25-19 @ 00:58), Max: 97.7 (01-25-19 @ 00:58)  HR: 85 (01-25-19 @ 11:16) (47 - 96)  BP: 124/71 (01-25-19 @ 09:40) (68/39 - 173/80)  SpO2: 90% (01-25-19 @ 11:21) (86% - 98%) 95% at bedside. Patient desaturates to high 80's- low 90's when asleep. Likely due to positioning and body habitus.      Gen: NAD, AAOx3  CV: S1S2, RRR, no M/R/G  Pulm: CTAB, no rhonchi or rales or wheezing  Abd: soft, nontender, no rebound or guarding, no epigastric tenderness, BS+. Fundus firm below umbilicus  : Pinto UO 2638-1811 350cc/h (adequate)  Ext: +1 LE and UE edema b/l  Neuro: +2 UE reflexes b/l        Medications:  ceFAZolin   IVPB: 100 (01-25-19 @ 06:29)  ceFAZolin   IVPB: 200 (01-24-19 @ 22:37)  hydrALAZINE Injectable: 10 (01-25-19 @ 02:19)  hydrALAZINE Injectable: 10 (01-25-19 @ 02:46)  labetalol: 100 (01-25-19 @ 03:11)  labetalol Injectable: 20 (01-25-19 @ 01:50)  magnesium sulfate  IVPB: 300 (01-25-19 @ 01:21)  magnesium sulfate Infusion: 50 (01-24-19 @ 23:52)        Labs:                        12.2   19.02 )-----------( 183      ( 25 Jan 2019 09:30 )             36.3     01-25    134<L>  |  95<L>  |  6<L>  ----------------------------<  86  4.3   |  23  |  0.5<L>    Ca    8.4<L>      25 Jan 2019 09:30  Mg     3.5     01-25 @0930    TPro  5.6<L>  /  Alb  3.0<L>  /  TBili  0.2  /  DBili  x   /  AST  21  /  ALT  9   /  AlkPhos  109  01-25    Magnesium, Serum: 3.5 mg/dL (01-25-19 @ 09:30)  Uric Acid, Serum: 4.2 mg/dL (01-25-19 @ 09:30)  Uric Acid, Serum: 4.0 mg/dL (01-24-19 @ 19:00)

## 2019-01-25 NOTE — PROGRESS NOTE ADULT - SUBJECTIVE AND OBJECTIVE BOX
PGY1 Mag Note    Subjective:   Pt evaluated at bedside for magnesium check. She denies visual disturbances, headache and right upper quadrant pain. Also denies nausea/vomiting/chest pain/epigastric pain/shortness of breath. Pain well controlled.     Objective:   Vital signs: T(F): 98.6 (01-25-19 @ 14:36), Max: 98.6 (01-25-19 @ 14:33)  HR: 77 (01-25-19 @ 15:41) (67 - 96)  BP: 81/44 (01-25-19 @ 15:31) (68/39 - 161/74)  SpO2: 95% (01-25-19 @ 15:41) (89% - 98%)      Gen: NAD, AAOx3  CV: S1S2, RRR, no M/R/G  Pulm: CTAB, no rhonchi or rales or wheezing  Abd: soft, gravid, mild tenderness on deep palpation in the RUQ, no rebound or guarding, no epigastric tenderness. Fundus firm above the umbilicus. Incision: CHANG in place clean/dry/intact  : Pinto UO 4737-4673 350cc/h adequate  Ext: +1 UE and LE edema b/l  Neuro: +2 reflexes UE b/l   Abdominal sonogram at bedside. Gallbladder [id number] x 4.48 cm. with multiple stones seen in the gallabladder.  No fluid noted in the abdomen      Medications:  acetaminophen   Tablet ..: 650 (01-25-19 @ 12:21)  ceFAZolin   IVPB: 100 (01-25-19 @ 14:20),  100 (01-25-19 @ 06:29)  ceFAZolin   IVPB: 200 (01-24-19 @ 22:37)  enoxaparin Injectable: 40 (01-25-19 @ 12:54)  ferrous    sulfate: 325 (01-25-19 @ 12:12)  hydrALAZINE Injectable: 10 (01-25-19 @ 02:46)  hydrALAZINE Injectable: 10 (01-25-19 @ 02:19)  labetalol: 100 (01-25-19 @ 03:11)  labetalol Injectable: 20 (01-25-19 @ 01:50)  magnesium sulfate  IVPB: 300 (01-25-19 @ 01:21)  magnesium sulfate Infusion: 50 (01-24-19 @ 23:52)  prenatal multivitamin: 1 (01-25-19 @ 12:11)  simethicone: 80 (01-25-19 @ 12:12)        Labs:                        12.2   19.02 )-----------( 183      ( 25 Jan 2019 09:30 )             36.3     01-25    134<L>  |  95<L>  |  6<L>  ----------------------------<  86  4.3   |  23  |  0.5<L>    Ca    8.4<L>      25 Jan 2019 09:30  Mg     3.5     01-25    TPro  5.6<L>  /  Alb  3.0<L>  /  TBili  0.2  /  DBili  x   /  AST  21  /  ALT  9   /  AlkPhos  109  01-25    Magnesium, Serum: 3.5 mg/dL (01-25-19 @ 09:30)  Uric Acid, Serum: 4.2 mg/dL (01-25-19 @ 09:30)  Uric Acid, Serum: 4.0 mg/dL (01-24-19 @ 19:00)

## 2019-01-25 NOTE — PROGRESS NOTE ADULT - ASSESSMENT
39 y/o P3 s/p repeat  #3 with vertical skin incision, pod1, CHTN now with superimposed preeclampsia, s/p IV pushes of labetalol and hydralazine, now with blood pressures in the non-severe range, on magnesium for seizure prophylaxis, doing well,  - Continue with magnesium till 2330 PM  - f/u BPs  - labetalol 100 mg BID  - strict I/Os  - neuro checks q2h  - pain management PRN  - routine postop care  - incentive spirometer encouraged  - lovenox for dvt ppx

## 2019-01-25 NOTE — PROGRESS NOTE ADULT - ASSESSMENT
39 y/o P3 s/p repeat  #3 with vertical skin incision, pod1, CHTN now with superimposed preeclampsia, s/p IV pushes of labetalol and hydralazine, now with blood pressures in the non-severe range, on magnesium for seizure prophylaxis, doing well.     - Continue with magnesium  - f/u BPs  - labetalol 100 mg BID  - strict I/Os  - neuro checks q2h  - pain management PRN  - routine postop care  - incentive spirometer encouraged  - lovenox for dvt ppx    Dr. Costello aware. Dr. Cm to be made aware 39 y/o P3 s/p repeat  #3 with vertical skin incision, pod1, CHTN now with superimposed preeclampsia, s/p IV pushes of labetalol and hydralazine, now with blood pressures in the non-severe range, on magnesium for seizure prophylaxis, doing well.     - Continue with magnesium  - f/u BPs  - labetalol 100 mg BID  - strict I/Os  - neuro checks q2h  - pain management PRN  - routine postop care  - incentive spirometer encouraged  - lovenox for dvt ppx    Dr. Costello and Dr. Pavon aware.

## 2019-01-25 NOTE — PROGRESS NOTE ADULT - SUBJECTIVE AND OBJECTIVE BOX
PGY 4 Note    Subjective: Pt seen and evaluated at bedside for Magnesium check. Patient is breast pumping. She denies visual disturbances, headache and right upper quadrant pain. Also denies nausea/vomiting/chest pain/epigastric pain/shortness of breath. She indicates that she had occasional gas pains, written for PO pain meds and gas X pills. No additional complaints at this time.     Objective:   Vital signs: T(F): 98.6 (01-25-19 @ 14:36), Max: 98.6 (01-25-19 @ 14:33)  HR: 80 (01-25-19 @ 18:46) (71 - 94)  BP: 136/61 (01-25-19 @ 18:46) (68/39 - 140/64)  RR: 16 (01-25-19 @ 16:10) (16 - 16)  SpO2: 94% (01-25-19 @ 18:46) (89% - 97%)    01-24-19 @ 07:01  -  01-25-19 @ 07:00  --------------------------------------------------------  IN: 850 mL / OUT: 1595 mL / NET: -745 mL    01-25-19 @ 07:01  -  01-25-19 @ 18:50  --------------------------------------------------------  IN: 3305 mL / OUT: 3030 mL / NET: 275 mL      Gen: NAD, AAOx3  CV: S1S2, RRR, no M/R/G  Pulm: CTAB, no rhonchi or rales or wheezing  Abd: soft, nontender, mild to moderate distension, +BS, no rebound or guarding, no epigastric tenderness  : Pinto in place, clear fluid  Neuro: +1 UE reflexes  Ext: nontender, SCDs in place     Medications:  acetaminophen   Tablet ..: 650 (01-25-19 @ 18:03),  650 (01-25-19 @ 12:21)  ceFAZolin   IVPB: 100 (01-25-19 @ 14:20),  100 (01-25-19 @ 06:29)  ceFAZolin   IVPB: 200 (01-24-19 @ 22:37)  enoxaparin Injectable: 40 (01-25-19 @ 12:54)  ferrous    sulfate: 325 (01-25-19 @ 12:12)  hydrALAZINE Injectable: 10 (01-25-19 @ 02:46)  hydrALAZINE Injectable: 10 (01-25-19 @ 02:19)  labetalol: 100 (01-25-19 @ 03:11)  labetalol: 100 (01-25-19 @ 17:56)  labetalol Injectable: 20 (01-25-19 @ 01:50)  magnesium sulfate  IVPB: 300 (01-25-19 @ 01:21)  magnesium sulfate Infusion: 50 (01-24-19 @ 23:52)  prenatal multivitamin: 1 (01-25-19 @ 12:11)  simethicone: 80 (01-25-19 @ 18:00),  80 (01-25-19 @ 12:12)    Labs:                        12.2   19.02 )-----------( 183      ( 25 Jan 2019 09:30 )             36.3     01-25    134<L>  |  95<L>  |  6<L>  ----------------------------<  86  4.3   |  23  |  0.5<L>    Ca    8.4<L>      25 Jan 2019 09:30  Mg     3.5     01-25    TPro  5.6<L>  /  Alb  3.0<L>  /  TBili  0.2  /  DBili  x   /  AST  21  /  ALT  9   /  AlkPhos  109  01-25    Magnesium, Serum: 3.5 mg/dL (01-25-19 @ 09:30)  Uric Acid, Serum: 4.2 mg/dL (01-25-19 @ 09:30)  Uric Acid, Serum: 4.0 mg/dL (01-24-19 @ 19:00)

## 2019-01-25 NOTE — PROGRESS NOTE ADULT - SUBJECTIVE AND OBJECTIVE BOX
Subjective:   Pt evaluated at bedside for magnesium check. She denies visual disturbances, headache and right upper quadrant pain. Also denies nausea/vomiting/chest pain/epigastric pain/shortness of breath. Pain well controlled.   Patient is s/p multiple IV pushes of hydralazine and labetalol for severe range BPs:  168/78 @0125 -> 173/80 @0139 -> labetalol 20 mg IVP @0150 -> 164/87 @0203 -> hydralazine 10 mg @0219 -> 166/91 @ 0242 -> hydralazine 10 mg @0246 -> 165/75 @0302 -> labetalol 20 mg - > 144/75 @0328    Objective:   T(F): 97.7 (01-25 @ 00:58), Max: 98.2 (01-24 @ 17:18)  HR: 78 (01-25 @ 03:36)  BP: 144/75 (01-25 @ 03:28) (109/66 - 173/80)  RR: 16 (01-24 @ 18:56)  SpO2: 96% (01-25 @ 03:36)  Vital Signs Last 24 Hrs  BP: 144/75 (25 Jan 2019 03:28) (109/66 - 173/80)      Daily Height in cm: 157.48 (24 Jan 2019 18:56)    Daily Baby A: Weight (gm) Delivery: 4130 (24 Jan 2019 23:20)    01-24 @ 07:01  -  01-25 @ 03:38  --------------------------------------------------------  IN: 225 mL / OUT: 565 mL / NET: -340 mL      Gen: NAD, AAOx3  CV: RRR, no M/R/G  Pulm: CTAB, no R/R/W  Abd: soft, obese, nontender, no rebound or guarding, no epigastric tenderness, CHNAG dressing clean and intact  : Pinto in place with clear urine  Ext: +1 edema bilateral edema, SCDs in place  Neuro: Reflexes 2+ bilaterally upper extremities    Medications:  magnesium now at 2 gr/hr  labetalol 20 mg IVP @0150   hydralazine 10 mg @0219   hydralazine 10 mg @0246  labetalol 20 mg @0316  labetalol 100 mg PO @0311    Labs:                        12.7   11.32 )-----------( 189      ( 24 Jan 2019 19:00 )             38.7     01-24    139  |  99  |  10  ----------------------------<  63<L>  4.2   |  19  |  0.6<L>    Ca    9.9      24 Jan 2019 19:00    TPro  6.9  /  Alb  3.6  /  TBili  0.2  /  DBili  x   /  AST  21  /  ALT  11  /  AlkPhos  136<H>  01-24    Uric Acid, Serum: 4.0 mg/dL (01-24 @ 19:00)    Upr/cr 0.4

## 2019-01-25 NOTE — PROGRESS NOTE ADULT - SUBJECTIVE AND OBJECTIVE BOX
Patient seen at bedside for severe range blood pressures.  168/78 @0125 -> 173/80 @0139 -> labetalol 20 mg IVP @0150 -> 164/87 @0203 161/84 @0212     Due to low HR 51 hydralazine 10 mg IVP was given at 0221. Will continue giving IV medications as needed per protocol.    Dr. Licona and Dr. Cm aware.

## 2019-01-25 NOTE — PROGRESS NOTE ADULT - ASSESSMENT
37 y/o P3 s/p repeat  #3 with vertical skin incision, CHTN now with superimposed preeclampsia, s/p IV pushes of labetalol and hydralazine, now with blood pressures in the non-severe range, on magnesium for seizure prophylaxis, doing well.   - f/u BPs  - labetalol 100 mg BID  - strict I/Os  - neuro checks q2h  - preeclamptic labs and magnesium  - pain management PRN  - routine postop care  - incentive spirometer encouraged  - lovenox at 1300    Dr. Costello aware. Dr. Cm to be made aware 37 y/o P3 s/p repeat  #3 with vertical skin incision, pod1, CHTN now with superimposed preeclampsia, s/p IV pushes of labetalol and hydralazine, now with blood pressures in the non-severe range, on magnesium for seizure prophylaxis, doing well.   - f/u BPs  - labetalol 100 mg BID  - strict I/Os  - neuro checks q2h  - preeclamptic labs and magnesium  - pain management PRN  - routine postop care  - incentive spirometer encouraged  - lovenox at 1300    Dr. Costello aware. Dr. Cm to be made aware

## 2019-01-25 NOTE — PROGRESS NOTE ADULT - SUBJECTIVE AND OBJECTIVE BOX
PGY1 Mag Note    Subjective:   Pt evaluated at bedside for magnesium check. She denies visual disturbances, headache and right upper quadrant pain. Also denies nausea/vomiting/chest pain/epigastric pain/shortness of breath. Pain well controlled.     Objective:   Vital signs: T(F): 97.9 (01-25-19 @ 07:30), Max: 97.9 (01-25-19 @ 07:30)  HR: 77 (01-25-19 @ 12:51) (47 - 96)  BP: 124/71 (01-25-19 @ 09:40) (68/39 - 173/80)   SpO2: 92% (01-25-19 @ 12:51) (86% - 98%) Patient desaturates to high 80's- low 90's when asleep. Likely due to positioning and body habitus.  BP:  (68/39 - 173/80)        Gen: NAD, AAOx3  CV: S1S2, RRR, no M/R/G  Pulm: CTAB, no rhonchi or rales or wheezing  Abd: BS+, soft, nontender, no rebound or guarding, no epigastric or RUQ tenderness. Fundus firm below the umbilicus. CHANG dressing in place: clean/dry/intact  : Pinto UO [id number] 225cc/h -adequate  Ext: +1 LE and UE edema b/l  Neuro: AAOx3, UE reflex b/l +2        Medications:  acetaminophen   Tablet ..: 650 (01-25-19 @ 12:21)  ceFAZolin   IVPB: 100 (01-25-19 @ 06:29)  ceFAZolin   IVPB: 200 (01-24-19 @ 22:37)  enoxaparin Injectable: 40 (01-25-19 @ 12:54)  ferrous    sulfate: 325 (01-25-19 @ 12:12)  hydrALAZINE Injectable: 10 (01-25-19 @ 02:46)  hydrALAZINE Injectable: 10 (01-25-19 @ 02:19)  labetalol: 100 (01-25-19 @ 03:11)  labetalol Injectable: 20 (01-25-19 @ 01:50)  magnesium sulfate  IVPB: 300 (01-25-19 @ 01:21)  magnesium sulfate Infusion: 50 (01-24-19 @ 23:52)  prenatal multivitamin: 1 (01-25-19 @ 12:11)  simethicone: 80 (01-25-19 @ 12:12)        Labs:                        12.2   19.02 )-----------( 183      ( 25 Jan 2019 09:30 )             36.3     01-25    134<L>  |  95<L>  |  6<L>  ----------------------------<  86  4.3   |  23  |  0.5<L>    Ca    8.4<L>      25 Jan 2019 09:30  Mg     3.5     01-25    TPro  5.6<L>  /  Alb  3.0<L>  /  TBili  0.2  /  DBili  x   /  AST  21  /  ALT  9   /  AlkPhos  109  01-25    Magnesium, Serum: 3.5 mg/dL (01-25-19 @ 09:30)  Uric Acid, Serum: 4.2 mg/dL (01-25-19 @ 09:30)  Uric Acid, Serum: 4.0 mg/dL (01-24-19 @ 19:00) PGY1 Mag Note    Subjective:   Pt evaluated at bedside for magnesium check. She denies visual disturbances, headache and right upper quadrant pain. Also denies nausea/vomiting/chest pain/epigastric pain/shortness of breath. Pain well controlled.     Objective:   Vital signs: T(F): 97.9 (01-25-19 @ 07:30), Max: 97.9 (01-25-19 @ 07:30)  HR: 77 (01-25-19 @ 12:51) (47 - 96)  BP: 124/71 (01-25-19 @ 09:40) (68/39 - 173/80)   SpO2: 92% (01-25-19 @ 12:51) (86% - 98%) Patient desaturates to high 80's- low 90's when asleep. Likely due to positioning and body habitus.  BP:  (68/39 - 173/80)        Gen: NAD, AAOx3  CV: S1S2, RRR, no M/R/G  Pulm: CTAB, no rhonchi or rales or wheezing  Abd: BS+, soft, nontender, no rebound or guarding, no epigastric or RUQ tenderness. Fundus firm above the umbilicus. CHANG dressing in place: clean/dry/intact  : Pinto UO [id number] 225cc/h -adequate  Ext: +1 LE and UE edema b/l  Neuro: AAOx3, UE reflex b/l +2        Medications:  acetaminophen   Tablet ..: 650 (01-25-19 @ 12:21)  ceFAZolin   IVPB: 100 (01-25-19 @ 06:29)  ceFAZolin   IVPB: 200 (01-24-19 @ 22:37)  enoxaparin Injectable: 40 (01-25-19 @ 12:54)  ferrous    sulfate: 325 (01-25-19 @ 12:12)  hydrALAZINE Injectable: 10 (01-25-19 @ 02:46)  hydrALAZINE Injectable: 10 (01-25-19 @ 02:19)  labetalol: 100 (01-25-19 @ 03:11)  labetalol Injectable: 20 (01-25-19 @ 01:50)  magnesium sulfate  IVPB: 300 (01-25-19 @ 01:21)  magnesium sulfate Infusion: 50 (01-24-19 @ 23:52)  prenatal multivitamin: 1 (01-25-19 @ 12:11)  simethicone: 80 (01-25-19 @ 12:12)        Labs:                        12.2   19.02 )-----------( 183      ( 25 Jan 2019 09:30 )             36.3     01-25    134<L>  |  95<L>  |  6<L>  ----------------------------<  86  4.3   |  23  |  0.5<L>    Ca    8.4<L>      25 Jan 2019 09:30  Mg     3.5     01-25    TPro  5.6<L>  /  Alb  3.0<L>  /  TBili  0.2  /  DBili  x   /  AST  21  /  ALT  9   /  AlkPhos  109  01-25    Magnesium, Serum: 3.5 mg/dL (01-25-19 @ 09:30)  Uric Acid, Serum: 4.2 mg/dL (01-25-19 @ 09:30)  Uric Acid, Serum: 4.0 mg/dL (01-24-19 @ 19:00)

## 2019-01-26 LAB
ALBUMIN SERPL ELPH-MCNC: 3 G/DL — LOW (ref 3.5–5.2)
ALP SERPL-CCNC: 99 U/L — SIGNIFICANT CHANGE UP (ref 30–115)
ALT FLD-CCNC: 8 U/L — SIGNIFICANT CHANGE UP (ref 0–41)
ANION GAP SERPL CALC-SCNC: 16 MMOL/L — HIGH (ref 7–14)
AST SERPL-CCNC: 17 U/L — SIGNIFICANT CHANGE UP (ref 0–41)
BILIRUB SERPL-MCNC: 0.2 MG/DL — SIGNIFICANT CHANGE UP (ref 0.2–1.2)
BUN SERPL-MCNC: 4 MG/DL — LOW (ref 10–20)
CALCIUM SERPL-MCNC: 7.4 MG/DL — LOW (ref 8.5–10.1)
CHLORIDE SERPL-SCNC: 98 MMOL/L — SIGNIFICANT CHANGE UP (ref 98–110)
CO2 SERPL-SCNC: 24 MMOL/L — SIGNIFICANT CHANGE UP (ref 17–32)
CREAT SERPL-MCNC: 0.5 MG/DL — LOW (ref 0.7–1.5)
GLUCOSE SERPL-MCNC: 100 MG/DL — HIGH (ref 70–99)
HCT VFR BLD CALC: 32.6 % — LOW (ref 37–47)
HGB BLD-MCNC: 11 G/DL — LOW (ref 12–16)
MCHC RBC-ENTMCNC: 28.6 PG — SIGNIFICANT CHANGE UP (ref 27–31)
MCHC RBC-ENTMCNC: 33.7 G/DL — SIGNIFICANT CHANGE UP (ref 32–37)
MCV RBC AUTO: 84.7 FL — SIGNIFICANT CHANGE UP (ref 81–99)
NRBC # BLD: 0 /100 WBCS — SIGNIFICANT CHANGE UP (ref 0–0)
PLATELET # BLD AUTO: 168 K/UL — SIGNIFICANT CHANGE UP (ref 130–400)
POTASSIUM SERPL-MCNC: 4.1 MMOL/L — SIGNIFICANT CHANGE UP (ref 3.5–5)
POTASSIUM SERPL-SCNC: 4.1 MMOL/L — SIGNIFICANT CHANGE UP (ref 3.5–5)
PROT SERPL-MCNC: 5.6 G/DL — LOW (ref 6–8)
RBC # BLD: 3.85 M/UL — LOW (ref 4.2–5.4)
RBC # FLD: 15.6 % — HIGH (ref 11.5–14.5)
SODIUM SERPL-SCNC: 138 MMOL/L — SIGNIFICANT CHANGE UP (ref 135–146)
WBC # BLD: 13.21 K/UL — HIGH (ref 4.8–10.8)
WBC # FLD AUTO: 13.21 K/UL — HIGH (ref 4.8–10.8)

## 2019-01-26 RX ORDER — LABETALOL HCL 100 MG
100 TABLET ORAL ONCE
Qty: 0 | Refills: 0 | Status: COMPLETED | OUTPATIENT
Start: 2019-01-26 | End: 2019-01-26

## 2019-01-26 RX ORDER — HYDRALAZINE HCL 50 MG
10 TABLET ORAL ONCE
Qty: 0 | Refills: 0 | Status: COMPLETED | OUTPATIENT
Start: 2019-01-26 | End: 2019-01-26

## 2019-01-26 RX ORDER — LABETALOL HCL 100 MG
200 TABLET ORAL
Qty: 0 | Refills: 0 | Status: DISCONTINUED | OUTPATIENT
Start: 2019-01-26 | End: 2019-01-28

## 2019-01-26 RX ADMIN — Medication 1 TABLET(S): at 11:46

## 2019-01-26 RX ADMIN — SIMETHICONE 80 MILLIGRAM(S): 80 TABLET, CHEWABLE ORAL at 00:16

## 2019-01-26 RX ADMIN — Medication 650 MILLIGRAM(S): at 18:35

## 2019-01-26 RX ADMIN — SIMETHICONE 80 MILLIGRAM(S): 80 TABLET, CHEWABLE ORAL at 11:46

## 2019-01-26 RX ADMIN — SIMETHICONE 80 MILLIGRAM(S): 80 TABLET, CHEWABLE ORAL at 23:49

## 2019-01-26 RX ADMIN — Medication 200 MILLIGRAM(S): at 19:14

## 2019-01-26 RX ADMIN — Medication 10 MILLIGRAM(S): at 07:03

## 2019-01-26 RX ADMIN — ENOXAPARIN SODIUM 40 MILLIGRAM(S): 100 INJECTION SUBCUTANEOUS at 11:49

## 2019-01-26 RX ADMIN — Medication 650 MILLIGRAM(S): at 00:35

## 2019-01-26 RX ADMIN — SIMETHICONE 80 MILLIGRAM(S): 80 TABLET, CHEWABLE ORAL at 07:06

## 2019-01-26 RX ADMIN — OXYCODONE AND ACETAMINOPHEN 1 TABLET(S): 5; 325 TABLET ORAL at 04:41

## 2019-01-26 RX ADMIN — Medication 650 MILLIGRAM(S): at 11:43

## 2019-01-26 RX ADMIN — Medication 325 MILLIGRAM(S): at 11:45

## 2019-01-26 RX ADMIN — Medication 100 MILLIGRAM(S): at 07:06

## 2019-01-26 RX ADMIN — Medication 650 MILLIGRAM(S): at 23:50

## 2019-01-26 RX ADMIN — SIMETHICONE 80 MILLIGRAM(S): 80 TABLET, CHEWABLE ORAL at 18:35

## 2019-01-26 RX ADMIN — Medication 100 MILLIGRAM(S): at 09:15

## 2019-01-26 NOTE — PROGRESS NOTE ADULT - ASSESSMENT
39 y/o P3 s/p repeat  #3 with vertical skin incision, pod1, CHTN now with superimposed preeclampsia, s/p IV pushes of labetalol and hydralazine, now with blood pressures in the non-severe range, s/p magnesium for seizure prophylaxis, doing well  - transfer to 4D  - f/u BPs q4h   - labetalol 100 mg BID  - TOV by 1100  - advance to regular diet  - pain management PRN  - routine postop care  - incentive spirometer encouraged  - lovenox for dvt ppx    will inform Dr. Pavon

## 2019-01-26 NOTE — CHART NOTE - NSCHARTNOTEFT_GEN_A_CORE
Called to pt bedside for elevated /87 @0530 w/ repeat 175/85 @0632. 10mg IVP hydralazine administered bedside. Pt asymptomatic. Denies HA, blurry vision, CP, SOB, N/V, RUQ/epigastric pain, LE pain. Repeat BP @0707 157/79.    Dr. Alas and Dr. Delacruz aware.

## 2019-01-26 NOTE — OB POSTPARTUM EVENT NOTE - NS_EVENTSUMMARY1_OBGYN_ALL_OB_FT
Patient admitted to floor post-op c/s hypertension on Magnesium in L&D  On admission b/p 181/87 HR 67 c/o headache patient taken to bathroom voided 400 ml urine rechecked B/P 15 minutes 175/85 HR 79  Dr. JUAN Richardson first year came to assess patient and administered Hydralazine 10 mg at 06:30 AM   0648am-Labetalol PO given as prescribed by MD  07:07am- Rechecked blood pressure 157/79 HR 83

## 2019-01-26 NOTE — PROGRESS NOTE ADULT - SUBJECTIVE AND OBJECTIVE BOX
PGY 4 Post Partum note    Subjective: pt seen and evaluated at bedside, indicates that she has a headache, frontal, denies blurry vision; denies fevers, chills, nausea, vomiting CP, SOB, RUQ/epigastric s/p magnesium. Blood on perineum, dark red blood, clotted. No active bleeding.     Physical exam:    Vital Signs Last 24 Hrs  T(C): 37 (25 Jan 2019 14:36), Max: 37 (25 Jan 2019 14:33)  T(F): 98.6 (25 Jan 2019 14:36), Max: 98.6 (25 Jan 2019 14:33)  HR: 82 (26 Jan 2019 04:41) (67 - 96)  BP: 137/55 (26 Jan 2019 04:32) (68/39 - 156/67)  RR: 16 (25 Jan 2019 16:10) (16 - 16)  SpO2: 96% (26 Jan 2019 04:41) (89% - 98%)      01-24-19 @ 07:01  -  01-25-19 @ 07:00  --------------------------------------------------------  IN: 850 mL / OUT: 1595 mL / NET: -745 mL    01-25-19 @ 07:01  -  01-26-19 @ 04:50  --------------------------------------------------------  IN: 4030 mL / OUT: 4680 mL / NET: -650 mL      Gen: NAD, AAOx3  CVS: s1s2, rrr  Lungs: ctab, no rhonchi or rales  Abdomen: Soft, appropriate postoperative tenderness, mild distension, +BS,  firm uterine fundus at umbilicus.  INcision: CHANG clean and dry  Pelvic: Normal lochia noted  Ext: No calf tenderness, SCDs in place    Diet: advanced to regular diet    meds:   acetaminophen   Tablet ..   650 milliGRAM(s) Oral (01-26-19 @ 00:35)   650 milliGRAM(s) Oral (01-25-19 @ 18:03)   650 milliGRAM(s) Oral (01-25-19 @ 12:21)    ceFAZolin   IVPB   100 mL/Hr IV Intermittent (01-25-19 @ 14:20)   100 mL/Hr IV Intermittent (01-25-19 @ 06:29)    enoxaparin Injectable   40 milliGRAM(s) SubCutaneous (01-25-19 @ 12:54)    ferrous    sulfate   325 milliGRAM(s) Oral (01-25-19 @ 12:12)    ketorolac   Injectable   30 milliGRAM(s) IV Push (01-25-19 @ 23:25)    labetalol   100 milliGRAM(s) Oral (01-25-19 @ 17:56)    oxyCODONE    5 mG/acetaminophen 325 mG   1 Tablet(s) Oral (01-26-19 @ 04:41)    oxyCODONE    5 mG/acetaminophen 325 mG   2 Tablet(s) Oral (01-25-19 @ 22:04)    prenatal multivitamin   1 Tablet(s) Oral (01-25-19 @ 12:11)    simethicone   80 milliGRAM(s) Chew (01-26-19 @ 00:16)   80 milliGRAM(s) Chew (01-25-19 @ 18:00)   80 milliGRAM(s) Chew (01-25-19 @ 12:12)        LABS:                        11.0   13.21 )-----------( 168      ( 25 Jan 2019 23:10 )             32.6                         12.2   19.02 )-----------( 183      ( 25 Jan 2019 09:30 )             36.3                         12.7   11.32 )-----------( 189      ( 24 Jan 2019 19:00 )             38.7     Magnesium, Serum: 3.5 mg/dL (01-25 @ 09:30)    01-25-19 @ 23:10      138  |  98  |  4<L>  ----------------------------<  100<H>  4.1   |  24  |  0.5<L>    01-25-19 @ 09:30      134<L>  |  95<L>  |  6<L>  ----------------------------<  86  4.3   |  23  |  0.5<L>    01-24-19 @ 19:00      139  |  99  |  10  ----------------------------<  63<L>  4.2   |  19  |  0.6<L>        Ca    7.4<L>      25 Jan 2019 23:10  Ca    8.4<L>      25 Jan 2019 09:30  Ca    9.9      24 Jan 2019 19:00  Mg     3.5<HH>     01-25    TPro  5.6<L>  /  Alb  3.0<L>  /  TBili  0.2  /  DBili  x   /  AST  17  /  ALT  8   /  AlkPhos  99  01-25-19 @ 23:10  TPro  5.6<L>  /  Alb  3.0<L>  /  TBili  0.2  /  DBili  x   /  AST  21  /  ALT  9   /  AlkPhos  109  01-25-19 @ 09:30  TPro  6.9  /  Alb  3.6  /  TBili  0.2  /  DBili  x   /  AST  21  /  ALT  11  /  AlkPhos  136<H>  01-24-19 @ 19:00

## 2019-01-27 ENCOUNTER — TRANSCRIPTION ENCOUNTER (OUTPATIENT)
Age: 39
End: 2019-01-27

## 2019-01-27 RX ORDER — LABETALOL HCL 100 MG
1 TABLET ORAL
Qty: 60 | Refills: 1 | OUTPATIENT
Start: 2019-01-27 | End: 2019-03-27

## 2019-01-27 RX ORDER — OXYCODONE AND ACETAMINOPHEN 5; 325 MG/1; MG/1
1 TABLET ORAL
Qty: 24 | Refills: 0
Start: 2019-01-27 | End: 2019-01-30

## 2019-01-27 RX ORDER — LABETALOL HCL 100 MG
1 TABLET ORAL
Qty: 0 | Refills: 0 | COMMUNITY

## 2019-01-27 RX ORDER — DOCUSATE SODIUM 100 MG
1 CAPSULE ORAL
Qty: 60 | Refills: 0
Start: 2019-01-27 | End: 2019-02-25

## 2019-01-27 RX ORDER — SIMETHICONE 80 MG/1
1 TABLET, CHEWABLE ORAL
Qty: 120 | Refills: 0
Start: 2019-01-27 | End: 2019-02-25

## 2019-01-27 RX ADMIN — Medication 200 MILLIGRAM(S): at 06:02

## 2019-01-27 RX ADMIN — Medication 1 TABLET(S): at 12:01

## 2019-01-27 RX ADMIN — Medication 325 MILLIGRAM(S): at 12:01

## 2019-01-27 RX ADMIN — ENOXAPARIN SODIUM 40 MILLIGRAM(S): 100 INJECTION SUBCUTANEOUS at 12:40

## 2019-01-27 RX ADMIN — SIMETHICONE 80 MILLIGRAM(S): 80 TABLET, CHEWABLE ORAL at 23:42

## 2019-01-27 RX ADMIN — Medication 650 MILLIGRAM(S): at 11:58

## 2019-01-27 RX ADMIN — Medication 650 MILLIGRAM(S): at 23:42

## 2019-01-27 RX ADMIN — Medication 200 MILLIGRAM(S): at 18:12

## 2019-01-27 RX ADMIN — SIMETHICONE 80 MILLIGRAM(S): 80 TABLET, CHEWABLE ORAL at 06:02

## 2019-01-27 RX ADMIN — Medication 650 MILLIGRAM(S): at 18:09

## 2019-01-27 RX ADMIN — Medication 650 MILLIGRAM(S): at 06:02

## 2019-01-27 RX ADMIN — SIMETHICONE 80 MILLIGRAM(S): 80 TABLET, CHEWABLE ORAL at 18:12

## 2019-01-27 RX ADMIN — SIMETHICONE 80 MILLIGRAM(S): 80 TABLET, CHEWABLE ORAL at 12:01

## 2019-01-27 NOTE — DISCHARGE NOTE OB - CARE PROVIDER_API CALL
Larry Felix (MD), Obstetrics and Gynecology  [address] [place] [address]  Phone: [phone]  Fax: [phone]

## 2019-01-27 NOTE — DISCHARGE NOTE OB - HOSPITAL COURSE
DATE OF DISCHARGE: 19 @ 08:42    HISTORY OF PRESENT ILLNESS/HOSPITAL COURSE: HPI:  37 yo  at 38w2d by LMP c/w first trim sonogram, CHTN on labetalol 100mg BID, [facility] for severely elevated BPs and decreased FM and abdominal pain. C/o lower abdominal pain especially when standing 9/10, feels lower abd cramping when sitting that is milder. Reports vaginal spotting in the AM, that resolved. Decreased FM since yesterday. Denies LOF. Reports new swelling in feet since yesterday and swelling in the hands that she's had for weeks. Denies headache, vision changes, CP, SOB. BP in clinic today 162/86 -> 165/81 repeat. + Epigastric tenderness and + incisional tenderness. BPP 8/8, breech presentation. Forgot her BP logs, but states that they are usually 140-150s/70-80s. NPO since 1330, instructed not to eat. Was previously taking aldomet 500mg BID but was switched at 35wks due to [place] aldomet shortage to labetalol 100mg BID. Echo done 55-60% EF, EKG NSR, last EFW at 35wks 2846g (31%ile).     H/o C/S x 2, first in  for arrest of dilation. Second in  repeat. Scheduled for repeat C/S at 39wks. (2019 17:51)    PAST MEDICAL & SURGICAL HISTORY:  Obesity  Hypertension  H/O left inguinal hernia repair   delivery delivered      PROCEDURES PERFORMED:  repeat  section, vertical incision   COMPLICATIONS: chtn with superimposed preeclampsia   POST PARTUM COURSE: discharged home on POD#3  FINAL DIAGNOSIS:  repeat  section, vertical incision     DISCHARGE CBC:                       11.0   13.21 )-----------( 168      ( 2019 23:10 )             32.6     DISCHARGE INSTRUCTIONS:  If you have severe abdominal pain, heavy vaginal bleeding, shortness of breath or chest pain please call your doctor or come to the emergency room.   DIET:  Advance as tolerated.  ACTIVITY:  Advance as tolerated.  Pelvic rest for 6 weeks.  Nothing to be inserted into the vagina for 6 weeks, i.e. no tampons, douching, sexual relations, or tub baths.   FOLLOW UP: Make an appointment to see your doctor as instructed   PRESCRIPTIONS: Prescriptions:  docusate sodium 100 mg oral capsule: 1 cap(s) orally 2 times a day, As needed, Stool Softening (2019 08:38)  oxycodone-acetaminophen 5 mg-325 mg oral tablet: 1 tab(s) orally every 4 hours, As needed, Moderate Pain (4 - 6) MDD:6 (2019 08:38)  simethicone 80 mg oral tablet, chewable: 1 tab(s) orally every 6 hours, As Needed  (2019 08:38)  Trandate 200 mg oral tablet: 1 tab(s) orally 2 times a day (2019 08:39) DATE OF DISCHARGE: 19 @ 08:42    HISTORY OF PRESENT ILLNESS/HOSPITAL COURSE: HPI:  37 yo  at 38w2d by LMP c/w first trim sonogram, CHTN on labetalol 100mg BID, [facility] for severely elevated BPs and decreased FM and abdominal pain. C/o lower abdominal pain especially when standing 9/10, feels lower abd cramping when sitting that is milder. Reports vaginal spotting in the AM, that resolved. Decreased FM since yesterday. Denies LOF. Reports new swelling in feet since yesterday and swelling in the hands that she's had for weeks. Denies headache, vision changes, CP, SOB. BP in clinic today 162/86 -> 165/81 repeat. + Epigastric tenderness and + incisional tenderness. BPP 8/8, breech presentation. Forgot her BP logs, but states that they are usually 140-150s/70-80s. NPO since 1330, instructed not to eat. Was previously taking aldomet 500mg BID but was switched at 35wks due to [place] wide aldomet shortage to labetalol 100mg BID. Echo done 55-60% EF, EKG NSR, last EFW at 35wks 2846g (31%ile).     H/o C/S x 2, first in  for arrest of dilation. Second in  repeat. Scheduled for repeat C/S at 39wks. (2019 17:51)    PROCEDURES PERFORMED:  repeat  section, vertical incision   COMPLICATIONS: chtn with superimposed preeclampsia   POST PARTUM COURSE: During postpartum, patient underwent magnesium for seizure prophylaxis with multiple IV pushes of medication. After Magnesium was completed for 24 hours, her PO labetalol was gradually increased due to increasing BPs. On POD 4 her PO labetalol was increased to 300 mg BID with improvement in BPs. On  @0230 patient complained of abdominal tenderness. Her dressing was removed and demonstrated some mild incisional erythema, mid-incision with 5-6 cm in length and 2 cm in width indurated area, nonfluctant, mild tenderness, no drainage from the incision site, staples intact, no deep tenderness to palpation. A CT abd/pelvis was done to r/o abdominal hernia v. abscess  and the impression demonstrated "Status post  section, with postoperative changes within the uterus and anterior abdominal wall with mild edema of fascia; no evidence of   abscess. Small amount of pelvic free fluid and stranding/edema of the peritoneal fat which is likely postoperative."     FINAL DIAGNOSIS:  repeat  section, vertical incision     DISCHARGE CBC:                       11.0   13.21 )-----------( 168      ( 2019 23:10 )             32.6     DISCHARGE INSTRUCTIONS:  If you have severe abdominal pain, heavy vaginal bleeding, shortness of breath or chest pain please call your doctor or come to the emergency room.   DIET:  Advance as tolerated.  ACTIVITY:  Advance as tolerated.  Pelvic rest for 6 weeks.  Nothing to be inserted into the vagina for 6 weeks, i.e. no tampons, douching, sexual relations, or tub baths.   FOLLOW UP: Make an appointment to see your doctor as instructed in 1 week for staple removal and Blood pressure checks  PRESCRIPTIONS: Prescriptions:  docusate sodium 100 mg oral capsule: 1 cap(s) orally 2 times a day, As needed, Stool Softening (2019 08:38)  oxycodone-acetaminophen 5 mg-325 mg oral tablet: 1 tab(s) orally every 4 hours, As needed, Moderate Pain (4 - 6) MDD:6 (2019 08:38)  simethicone 80 mg oral tablet, chewable: 1 tab(s) orally every 6 hours, As Needed  (2019 08:38)  Trandate 200 mg oral tablet: 1 tab(s) orally 2 times a day (2019 08:39) DATE OF DISCHARGE: 19 @ 08:42    HISTORY OF PRESENT ILLNESS/HOSPITAL COURSE: HPI:  39 yo  at 38w2d by LMP c/w first trim sonogram, CHTN on labetalol 100mg BID, [facility] for severely elevated BPs and decreased FM and abdominal pain. C/o lower abdominal pain especially when standing 9/10, feels lower abd cramping when sitting that is milder. Reports vaginal spotting in the AM, that resolved. Decreased FM since yesterday. Denies LOF. Reports new swelling in feet since yesterday and swelling in the hands that she's had for weeks. Denies headache, vision changes, CP, SOB. BP in clinic today 162/86 -> 165/81 repeat. + Epigastric tenderness and + incisional tenderness. BPP 8/8, breech presentation. Forgot her BP logs, but states that they are usually 140-150s/70-80s. NPO since 1330, instructed not to eat. Was previously taking aldomet 500mg BID but was switched at 35wks due to [place] wide aldomet shortage to labetalol 100mg BID. Echo done 55-60% EF, EKG NSR, last EFW at 35wks 2846g (31%ile).     H/o C/S x 2, first in  for arrest of dilation. Second in  repeat. Scheduled for repeat C/S at 39wks. (2019 17:51)    PROCEDURES PERFORMED:  repeat  section, vertical incision   COMPLICATIONS: chtn with superimposed preeclampsia   POST PARTUM COURSE: During postpartum, patient underwent magnesium for seizure prophylaxis with multiple IV pushes of medication. After Magnesium was completed for 24 hours, her PO labetalol was gradually increased due to increasing BPs. On POD 4 her PO labetalol was increased to 300 mg BID with improvement in BPs. On  @0230 patient complained of abdominal tenderness. Her dressing was removed and demonstrated some mild incisional erythema, mid-incision with 5-6 cm in length and 2 cm in width indurated area, nonfluctant, mild tenderness, no drainage from the incision site, staples intact, no deep tenderness to palpation. A CT abd/pelvis was done to r/o abdominal hernia v. abscess  and the impression demonstrated "Status post  section, with postoperative changes within the uterus and anterior abdominal wall with mild edema of fascia; no evidence of   abscess. Small amount of pelvic free fluid and stranding/edema of the peritoneal fat which is likely postoperative." Patient continued to have elevated BPs, therefore her Labetalol was converted to 200 mg TID. BP Range improved. Staples were removed as well while in house. With improved BPs, patient was discharged home with instructions to follow up in 1 week for wound and BP check. VNS services provided.    FINAL DIAGNOSIS:  repeat  section, vertical incision     DISCHARGE CBC:                       11.0   13.21 )-----------( 168      ( 2019 23:10 )             32.6     DISCHARGE INSTRUCTIONS:  If you have severe abdominal pain, heavy vaginal bleeding, shortness of breath or chest pain please call your doctor or come to the emergency room.   DIET:  Advance as tolerated.  ACTIVITY:  Advance as tolerated.  Pelvic rest for 6 weeks.  Nothing to be inserted into the vagina for 6 weeks, i.e. no tampons, douching, sexual relations, or tub baths.   FOLLOW UP: Make an appointment to see your doctor as instructed in 1 week for staple removal and Blood pressure checks  PRESCRIPTIONS: Prescriptions:  docusate sodium 100 mg oral capsule: 1 cap(s) orally 2 times a day, As needed, Stool Softening (2019 08:38)  oxycodone-acetaminophen 5 mg-325 mg oral tablet: 1 tab(s) orally every 4 hours, As needed, Moderate Pain (4 - 6) MDD:6 (2019 08:38)  simethicone 80 mg oral tablet, chewable: 1 tab(s) orally every 6 hours, As Needed  (2019 08:38)  Trandate 200 mg oral tablet: 1 tab(s) orally 3 times a day (2019 08:39) DATE OF DISCHARGE: 19 @ 08:42    HISTORY OF PRESENT ILLNESS/HOSPITAL COURSE: HPI:  39 yo  at 38w2d by LMP c/w first trim sonogram, CHTN on labetalol 100mg BID, [facility] for severely elevated BPs and decreased FM and abdominal pain. C/o lower abdominal pain especially when standing 9/10, feels lower abd cramping when sitting that is milder. Reports vaginal spotting in the AM, that resolved. Decreased FM since yesterday. Denies LOF. Reports new swelling in feet since yesterday and swelling in the hands that she's had for weeks. Denies headache, vision changes, CP, SOB. BP in clinic today 162/86 -> 165/81 repeat. + Epigastric tenderness and + incisional tenderness. BPP 8/8, breech presentation. Forgot her BP logs, but states that they are usually 140-150s/70-80s. NPO since 1330, instructed not to eat. Was previously taking aldomet 500mg BID but was switched at 35wks due to [place] wide aldomet shortage to labetalol 100mg BID. Echo done 55-60% EF, EKG NSR, last EFW at 35wks 2846g (31%ile).     H/o C/S x 2, first in  for arrest of dilation. Second in  repeat. Scheduled for repeat C/S at 39wks. (2019 17:51)    PROCEDURES PERFORMED:  repeat  section, vertical incision   COMPLICATIONS: chtn with superimposed preeclampsia   POST PARTUM COURSE: During postpartum, patient underwent magnesium for seizure prophylaxis with multiple IV pushes of medication. After Magnesium was completed for 24 hours, her PO labetalol was gradually increased due to increasing BPs. On POD 4 her PO labetalol was increased to 300 mg BID with improvement in BPs. On  @0230 patient complained of abdominal tenderness. Her dressing was removed and demonstrated some mild incisional erythema, mid-incision with 5-6 cm in length and 2 cm in width indurated area, nonfluctant, mild tenderness, no drainage from the incision site, staples intact, no deep tenderness to palpation. A CT abd/pelvis was done to r/o abdominal hernia v. abscess  and the impression demonstrated "Status post  section, with postoperative changes within the uterus and anterior abdominal wall with mild edema of fascia; no evidence of   abscess. Small amount of pelvic free fluid and stranding/edema of the peritoneal fat which is likely postoperative." Patient continued to have elevated BPs, therefore her Labetalol was converted to 200 mg TID. BP Range improved. Staples were removed as well while in house. Pt later complained of drainage and lesa from incision and small 1x2cm non-tracking defect was noted at superior aspect of incision. Pt started on bactrim, packing placed in defect. VNS set up for wound care. With improved BPs, patient was discharged home with instructions to follow up in 1 week for wound and BP check. VNS services provided.    FINAL DIAGNOSIS:  repeat  section, vertical incision     DISCHARGE CBC:                       11.0   13.21 )-----------( 168      ( 2019 23:10 )             32.6     DISCHARGE INSTRUCTIONS:  If you have severe abdominal pain, heavy vaginal bleeding, shortness of breath or chest pain please call your doctor or come to the emergency room.   DIET:  Advance as tolerated.  ACTIVITY:  Advance as tolerated.  Pelvic rest for 6 weeks.  Nothing to be inserted into the vagina for 6 weeks, i.e. no tampons, douching, sexual relations, or tub baths.   FOLLOW UP: Make an appointment to see your doctor as instructed in 1 week for staple removal and Blood pressure checks  PRESCRIPTIONS: Prescriptions:  docusate sodium 100 mg oral capsule: 1 cap(s) orally 2 times a day, As needed, Stool Softening (2019 08:38)  oxycodone-acetaminophen 5 mg-325 mg oral tablet: 1 tab(s) orally every 4 hours, As needed, Moderate Pain (4 - 6) MDD:6 (2019 08:38)  simethicone 80 mg oral tablet, chewable: 1 tab(s) orally every 6 hours, As Needed  (2019 08:38)  Trandate 200 mg oral tablet: 1 tab(s) orally 3 times a day (2019 08:39)

## 2019-01-27 NOTE — DISCHARGE NOTE OB - PATIENT PORTAL LINK FT
You can access the [facility], offered by [facility], by registering with the following website: [email or web address]

## 2019-01-27 NOTE — DISCHARGE NOTE OB - ADDITIONAL INSTRUCTIONS
nothing in the vagina for 6 weeks, no tampons, no douching, no baths, no sex. Showers are fine.   Go to the emergency room if you have a temperature greater than 100.4, worsening abdominal pain or increased vaginal bleeding     See Dr. Cm in 1 week for a BP and incision check nothing in the vagina for 6 weeks, no tampons, no douching, no baths, no sex. Showers are fine.   Go to the emergency room if you have a temperature greater than 100.4, worsening abdominal pain or increased vaginal bleeding     See Dr. Cm in 1 week for a BP and incision check. Seen in 6 weeks for normal post partum visit.   A visiting nurse will come to your house to take your BP this week and to help you care for your wound  Please follow up with your primary doctor for blood pressure control.  A prescription for bactrim has been sent to your pharmacy, take as directed.

## 2019-01-27 NOTE — DISCHARGE NOTE OB - MEDICATION SUMMARY - MEDICATIONS TO TAKE
I will START or STAY ON the medications listed below when I get home from the hospital:    oxycodone-acetaminophen 5 mg-325 mg oral tablet  -- 1 tab(s) by mouth every 4 hours, As needed, Moderate Pain (4 - 6) MDD:6  -- Indication: For severe pain    Trandate 200 mg oral tablet  -- 1 tab(s) by mouth 2 times a day  -- Indication: For bp meds take every day    docusate sodium 100 mg oral capsule  -- 1 cap(s) by mouth 2 times a day, As needed, Stool Softening  -- Indication: For Constipation    simethicone 80 mg oral tablet, chewable  -- 1 tab(s) by mouth every 6 hours, As Needed   -- Indication: For gas pain I will START or STAY ON the medications listed below when I get home from the hospital:    oxycodone-acetaminophen 5 mg-325 mg oral tablet  -- 1 tab(s) by mouth every 4 hours, As needed, Moderate Pain (4 - 6) MDD:6  -- Indication: For severe pain    labetalol 300 mg oral tablet  -- 1 tab(s) by mouth 2 times a day  -- Indication: For Chronic hypertension with superimposed pre-eclampsia    docusate sodium 100 mg oral capsule  -- 1 cap(s) by mouth 2 times a day, As needed, Stool Softening  -- Indication: For Constipation    simethicone 80 mg oral tablet, chewable  -- 1 tab(s) by mouth every 6 hours, As Needed   -- Indication: For gas pain I will START or STAY ON the medications listed below when I get home from the hospital:    oxycodone-acetaminophen 5 mg-325 mg oral tablet  -- 1 tab(s) by mouth every 4 hours, As needed, Moderate Pain (4 - 6) MDD:6  -- Indication: For severe pain    labetalol 200 mg oral tablet  -- 1 tab(s) by mouth 3 times a day  -- Indication: For blood pressure    docusate sodium 100 mg oral capsule  -- 1 cap(s) by mouth 2 times a day, As needed, Stool Softening  -- Indication: For Constipation    simethicone 80 mg oral tablet, chewable  -- 1 tab(s) by mouth every 6 hours, As Needed   -- Indication: For gas pain I will START or STAY ON the medications listed below when I get home from the hospital:    oxycodone-acetaminophen 5 mg-325 mg oral tablet  -- 1 tab(s) by mouth every 4 hours, As needed, Moderate Pain (4 - 6) MDD:6  -- Indication: For severe pain    docusate sodium 100 mg oral capsule  -- 1 cap(s) by mouth 2 times a day, As needed, Stool Softening  -- Indication: For Constipation    simethicone 80 mg oral tablet, chewable  -- 1 tab(s) by mouth every 6 hours, As Needed   -- Indication: For gas pain    sulfamethoxazole-trimethoprim 800 mg-160 mg oral tablet  -- 1 tab(s) by mouth 2 times a day  -- Indication: For take as directed

## 2019-01-27 NOTE — DISCHARGE NOTE OB - MEDICATION SUMMARY - MEDICATIONS TO STOP TAKING
I will STOP taking the medications listed below when I get home from the hospital:    NIFEdipine 30 mg oral tablet, extended release  -- 1 tab(s) by mouth once a day   -- Avoid grapefruit and grapefruit juice while taking this medication.  It is very important that you take or use this exactly as directed.  Do not skip doses or discontinue unless directed by your doctor.  Some non-prescription drugs may aggravate your condition.  Read all labels carefully.  If a warning appears, check with your doctor before taking.  Swallow whole.  Do not crush.    labetalol 100 mg oral tablet  -- 1 tab(s) by mouth 2 times a day

## 2019-01-27 NOTE — PROGRESS NOTE ADULT - ASSESSMENT
37yo, s/p repeat  with vertical incision, POD#3, with cholelithiasis, with chronic hypertension with superimposed preeclampsia, s/p magnesium therapy, on lebatolol 200mg BID, blood pressures currently well controlled.   - encourage ambulation  - PO hydration  - Continue Diet as tolerated  - DVT ppx   -Incentive Spirometry encouraged   -Vitals [id number]   -possible discharge today     Dr. Church aware

## 2019-01-27 NOTE — DISCHARGE NOTE OB - CARE PLAN
Principal Discharge DX:	 delivery delivered  Goal:	healthy mother and baby  Assessment and plan of treatment:	vitals and labs  Secondary Diagnosis:	Chronic hypertension with superimposed pre-eclampsia  Goal:	normal blood presssures  Assessment and plan of treatment:	meds and labs

## 2019-01-27 NOTE — PROGRESS NOTE ADULT - SUBJECTIVE AND OBJECTIVE BOX
GENE NICOLEADRIANNA  39y  Female    PGY1 Note:    Patient seen and examined bedside. No overnight events. Denies heavy vaginal bleeding and reports pain well controlled. Patient reports epigastric, RUQ, and shoulder pain, intermittent, that occurs after eating food. Ambulating without difficulty. Tolerating Diet. Reports +Flatus, denies BM. Denies HA, vision changes, CP, SOB, N/V, LE pain/ swelling, diarrhea, pain/difficulty with urination, fevers, chills.       MEDICATIONS  (STANDING):  acetaminophen   Tablet .. 650 milliGRAM(s) Oral every 6 hours  enoxaparin Injectable 40 milliGRAM(s) SubCutaneous daily  ferrous    sulfate 325 milliGRAM(s) Oral daily  labetalol 200 milliGRAM(s) Oral two times a day  prenatal multivitamin 1 Tablet(s) Oral daily  simethicone 80 milliGRAM(s) Chew every 6 hours    MEDICATIONS  (PRN):  diphenhydrAMINE 25 milliGRAM(s) Oral every 6 hours PRN Itching  docusate sodium 100 milliGRAM(s) Oral two times a day PRN Stool Softening  glycerin Suppository - Adult 1 Suppository(s) Rectal at bedtime PRN Constipation  ibuprofen  Tablet. 600 milliGRAM(s) Oral every 6 hours PRN Moderate Pain (4 - 6)  lanolin Ointment 1 Application(s) Topical every 3 hours PRN Sore Nipples  ondansetron Injectable 4 milliGRAM(s) IV Push every 6 hours PRN Nausea  oxyCODONE    5 mG/acetaminophen 325 mG 1 Tablet(s) Oral every 4 hours PRN Moderate Pain (4 - 6)  oxyCODONE    5 mG/acetaminophen 325 mG 2 Tablet(s) Oral every 6 hours PRN Severe Pain (7 - 10)      PAST MEDICAL & SURGICAL HISTORY:  Obesity  Hypertension  H/O left inguinal hernia repair   delivery delivered      Physical Exam  Vital Signs Last 24 Hrs  T(C): 37 (2019 03:14), Max: 37.2 (2019 19:25)  T(F): 98.6 (2019 03:14), Max: 99 (2019 19:25)  HR: 81 (2019 06:04) (65 - 94)  BP: 143/83 (2019 06:04) (124/64 - 144/65)  RR: 20 (2019 03:14) (18 - 20)    Gen: AAOx3, NAD  CV: RRR. No murmors gallops or rubs.  Pulm: CTAB. No wheezes or rales.  Ext: No calf tenderness, no swelling. SCDs in place  Abd: Soft, nontender, nondistended, BS+  Fundus firm, and below umbilicus.   Lochia: Minimal  Wound: Vertical incision, CHANG dressing inplace and functional. Mild saturation at midline point.        Labs:                          11.0   13.21 )-----------( 168      ( 2019 23:10 )             32.6                         12.2   19.02 )-----------( 183      ( 2019 09:30 )             36.3

## 2019-01-28 RX ORDER — NIFEDIPINE 30 MG
10 TABLET, EXTENDED RELEASE 24 HR ORAL ONCE
Qty: 0 | Refills: 0 | Status: COMPLETED | OUTPATIENT
Start: 2019-01-28 | End: 2019-01-28

## 2019-01-28 RX ORDER — LABETALOL HCL 100 MG
300 TABLET ORAL
Qty: 0 | Refills: 0 | Status: DISCONTINUED | OUTPATIENT
Start: 2019-01-28 | End: 2019-01-29

## 2019-01-28 RX ADMIN — Medication 1 TABLET(S): at 12:23

## 2019-01-28 RX ADMIN — Medication 650 MILLIGRAM(S): at 06:09

## 2019-01-28 RX ADMIN — ENOXAPARIN SODIUM 40 MILLIGRAM(S): 100 INJECTION SUBCUTANEOUS at 12:27

## 2019-01-28 RX ADMIN — SIMETHICONE 80 MILLIGRAM(S): 80 TABLET, CHEWABLE ORAL at 06:08

## 2019-01-28 RX ADMIN — SIMETHICONE 80 MILLIGRAM(S): 80 TABLET, CHEWABLE ORAL at 18:21

## 2019-01-28 RX ADMIN — Medication 300 MILLIGRAM(S): at 18:20

## 2019-01-28 RX ADMIN — Medication 650 MILLIGRAM(S): at 18:21

## 2019-01-28 RX ADMIN — Medication 10 MILLIGRAM(S): at 04:01

## 2019-01-28 RX ADMIN — SIMETHICONE 80 MILLIGRAM(S): 80 TABLET, CHEWABLE ORAL at 12:28

## 2019-01-28 RX ADMIN — Medication 200 MILLIGRAM(S): at 06:08

## 2019-01-28 RX ADMIN — Medication 600 MILLIGRAM(S): at 08:04

## 2019-01-28 RX ADMIN — Medication 650 MILLIGRAM(S): at 12:26

## 2019-01-28 RX ADMIN — Medication 325 MILLIGRAM(S): at 12:28

## 2019-01-28 NOTE — PROGRESS NOTE ADULT - SUBJECTIVE AND OBJECTIVE BOX
PGY 1 Post Partum note   #: [id number]    Subjective:    Physical exam:    Vital Signs Last 24 Hrs  T(C): 36.7 (28 Jan 2019 03:12), Max: 36.8 (27 Jan 2019 23:37)  T(F): 98.1 (28 Jan 2019 03:12), Max: 98.3 (27 Jan 2019 23:37)  HR: 80 (28 Jan 2019 05:15) (68 - 80)  BP: 149/72 (28 Jan 2019 05:15) (134/70 - 162/74)  BP(mean): --  RR: 20 (28 Jan 2019 03:12) (18 - 20)  SpO2: --    Gen: NAD  CVS: s1s2, rrr  Lungs: ctab, no rhonchi or rales  Abdomen: Soft, nontender, no distension , firm uterine fundus at umbilicus.  Pelvic: Normal lochia noted  Ext: No calf tenderness    Diet:  meds:   acetaminophen   Tablet ..   650 milliGRAM(s) Oral (01-28-19 @ 06:09)   650 milliGRAM(s) Oral (01-27-19 @ 23:42)   650 milliGRAM(s) Oral (01-27-19 @ 18:09)   650 milliGRAM(s) Oral (01-27-19 @ 11:58)    enoxaparin Injectable   40 milliGRAM(s) SubCutaneous (01-27-19 @ 12:40)    ferrous    sulfate   325 milliGRAM(s) Oral (01-27-19 @ 12:01)    labetalol   200 milliGRAM(s) Oral (01-28-19 @ 06:08)   200 milliGRAM(s) Oral (01-27-19 @ 18:12)    NIFEdipine IR   10 milliGRAM(s) Oral (01-28-19 @ 04:01)    prenatal multivitamin   1 Tablet(s) Oral (01-27-19 @ 12:01)    simethicone   80 milliGRAM(s) Chew (01-28-19 @ 06:08)   80 milliGRAM(s) Chew (01-27-19 @ 23:42)   80 milliGRAM(s) Chew (01-27-19 @ 18:12)   80 milliGRAM(s) Chew (01-27-19 @ 12:01)        LABS:      01-25-19 @ 23:10      138  |  98  |  4<L>  ----------------------------<  100<H>  4.1   |  24  |  0.5<L>    01-25-19 @ 09:30      134<L>  |  95<L>  |  6<L>  ----------------------------<  86  4.3   |  23  |  0.5<L>        Ca    7.4<L>      25 Jan 2019 23:10  Ca    8.4<L>      25 Jan 2019 09:30  Mg     3.5<HH>     01-25    TPro  5.6<L>  /  Alb  3.0<L>  /  TBili  0.2  /  DBili  x   /  AST  17  /  ALT  8   /  AlkPhos  99  01-25-19 @ 23:10  TPro  5.6<L>  /  Alb  3.0<L>  /  TBili  0.2  /  DBili  x   /  AST  21  /  ALT  9   /  AlkPhos  109  01-25-19 @ 09:30 PGY 1 Post Partum note   #: [id number]    Subjective: Pt seen and examined at bedside. Doing well, pain controlled w/ meds. Pt had severely elevated BPs overnight, required stat dose procardia 10mg PO. Pt is voiding without difficulty, passing flatus no BM, tolerating regular diet, ambulating, bottle feeding. Denies HA, blurry vision, CP, SOB, N/V, RUQ/epigastric pain, LE pain.    Physical exam:    Vital Signs Last 24 Hrs  T(F): 98.1 (28 Jan 2019 03:12), Max: 98.3 (27 Jan 2019 23:37)  HR: 80 (28 Jan 2019 05:15) (68 - 80)  BP: 149/72 (28 Jan 2019 05:15) (134/70 - 162/74)  RR: 20 (28 Jan 2019 03:12) (18 - 20)    Gen: NAD  CVS: s1s2, rrr  Lungs: ctab, no r/r/w  Abdomen: Soft, appropriately tender, firm uterine fundus below umbilicus, +BS, no RUQ/epigastric pain  Incision: CHANG changed, staples in place, c/d/i  Pelvic: minimal lochia rubra noted  Ext: No calf tenderness    Diet: Regular  meds:   acetaminophen   Tablet ..   650 milliGRAM(s) Oral (01-28-19 @ 06:09)   650 milliGRAM(s) Oral (01-27-19 @ 23:42)   650 milliGRAM(s) Oral (01-27-19 @ 18:09)   650 milliGRAM(s) Oral (01-27-19 @ 11:58)    enoxaparin Injectable   40 milliGRAM(s) SubCutaneous (01-27-19 @ 12:40)    ferrous    sulfate   325 milliGRAM(s) Oral (01-27-19 @ 12:01)    labetalol   200 milliGRAM(s) Oral (01-28-19 @ 06:08)   200 milliGRAM(s) Oral (01-27-19 @ 18:12)    NIFEdipine IR   10 milliGRAM(s) Oral (01-28-19 @ 04:01)    prenatal multivitamin   1 Tablet(s) Oral (01-27-19 @ 12:01)    simethicone   80 milliGRAM(s) Chew (01-28-19 @ 06:08)   80 milliGRAM(s) Chew (01-27-19 @ 23:42)   80 milliGRAM(s) Chew (01-27-19 @ 18:12)   80 milliGRAM(s) Chew (01-27-19 @ 12:01)        LABS:      01-25-19 @ 23:10      138  |  98  |  4<L>  ----------------------------<  100<H>  4.1   |  24  |  0.5<L>    01-25-19 @ 09:30      134<L>  |  95<L>  |  6<L>  ----------------------------<  86  4.3   |  23  |  0.5<L>        Ca    7.4<L>      25 Jan 2019 23:10  Ca    8.4<L>      25 Jan 2019 09:30  Mg     3.5<HH>     01-25    TPro  5.6<L>  /  Alb  3.0<L>  /  TBili  0.2  /  DBili  x   /  AST  17  /  ALT  8   /  AlkPhos  99  01-25-19 @ 23:10  TPro  5.6<L>  /  Alb  3.0<L>  /  TBili  0.2  /  DBili  x   /  AST  21  /  ALT  9   /  AlkPhos  109  01-25-19 @ 09:30

## 2019-01-28 NOTE — PROGRESS NOTE ADULT - SUBJECTIVE AND OBJECTIVE BOX
PGY 4 Note    received call from nursing staff regarding patient's BP, elevated 161/80 and repeat 162/74. Stat procardia 10 mg ordered and adminsitered. Awaiting repeat BP. If BP continues to be elevated, should consider increasing BP meds to Labetalol 300 mg BID or adding additional medication such a procardia.     Will inform Dr. pardo

## 2019-01-28 NOTE — PROGRESS NOTE ADULT - ASSESSMENT
37 yo now , s/p repeat  w/ vertical incision, POD#4, w/ cHTN w/ superimposed preeclampsia, s/p Mg, on labetalol 200mg BID, s/p IVP hydralazine + stat procardia, w/ BPs oscillating between elevated and severe.    - will consider changing med regimen to obtain better BP control  - pain management PRN  - c/w regular diet, PO hydration  - encourage ambulation, incentive spirometry  - lovenox for DVT ppx  - monitor BPs  - possible d/c today pending BP control    Dr. Costello aware and Dr. Cerna to be made aware.

## 2019-01-29 DIAGNOSIS — O99.213 OBESITY COMPLICATING PREGNANCY, THIRD TRIMESTER: ICD-10-CM

## 2019-01-29 DIAGNOSIS — Z3A.38 38 WEEKS GESTATION OF PREGNANCY: ICD-10-CM

## 2019-01-29 DIAGNOSIS — O10.013 PRE-EXISTING ESSENTIAL HYPERTENSION COMPLICATING PREGNANCY, THIRD TRIMESTER: ICD-10-CM

## 2019-01-29 DIAGNOSIS — O09.93 SUPERVISION OF HIGH RISK PREGNANCY, UNSPECIFIED, THIRD TRIMESTER: ICD-10-CM

## 2019-01-29 LAB
ANION GAP SERPL CALC-SCNC: 16 MMOL/L — HIGH (ref 7–14)
BUN SERPL-MCNC: 10 MG/DL — SIGNIFICANT CHANGE UP (ref 10–20)
CALCIUM SERPL-MCNC: 9.1 MG/DL — SIGNIFICANT CHANGE UP (ref 8.5–10.1)
CHLORIDE SERPL-SCNC: 102 MMOL/L — SIGNIFICANT CHANGE UP (ref 98–110)
CO2 SERPL-SCNC: 22 MMOL/L — SIGNIFICANT CHANGE UP (ref 17–32)
CREAT SERPL-MCNC: 0.6 MG/DL — LOW (ref 0.7–1.5)
GLUCOSE SERPL-MCNC: 77 MG/DL — SIGNIFICANT CHANGE UP (ref 70–99)
HCT VFR BLD CALC: 33.3 % — LOW (ref 37–47)
HGB BLD-MCNC: 10.7 G/DL — LOW (ref 12–16)
MCHC RBC-ENTMCNC: 27.9 PG — SIGNIFICANT CHANGE UP (ref 27–31)
MCHC RBC-ENTMCNC: 32.1 G/DL — SIGNIFICANT CHANGE UP (ref 32–37)
MCV RBC AUTO: 86.7 FL — SIGNIFICANT CHANGE UP (ref 81–99)
NRBC # BLD: 0 /100 WBCS — SIGNIFICANT CHANGE UP (ref 0–0)
PLATELET # BLD AUTO: 265 K/UL — SIGNIFICANT CHANGE UP (ref 130–400)
POTASSIUM SERPL-MCNC: 4 MMOL/L — SIGNIFICANT CHANGE UP (ref 3.5–5)
POTASSIUM SERPL-SCNC: 4 MMOL/L — SIGNIFICANT CHANGE UP (ref 3.5–5)
RBC # BLD: 3.84 M/UL — LOW (ref 4.2–5.4)
RBC # FLD: 15.7 % — HIGH (ref 11.5–14.5)
SODIUM SERPL-SCNC: 140 MMOL/L — SIGNIFICANT CHANGE UP (ref 135–146)
SURGICAL PATHOLOGY STUDY: SIGNIFICANT CHANGE UP
WBC # BLD: 8.55 K/UL — SIGNIFICANT CHANGE UP (ref 4.8–10.8)
WBC # FLD AUTO: 8.55 K/UL — SIGNIFICANT CHANGE UP (ref 4.8–10.8)

## 2019-01-29 RX ORDER — LABETALOL HCL 100 MG
200 TABLET ORAL THREE TIMES A DAY
Qty: 0 | Refills: 0 | Status: DISCONTINUED | OUTPATIENT
Start: 2019-01-30 | End: 2019-01-30

## 2019-01-29 RX ORDER — LABETALOL HCL 100 MG
300 TABLET ORAL ONCE
Qty: 0 | Refills: 0 | Status: COMPLETED | OUTPATIENT
Start: 2019-01-29 | End: 2019-01-29

## 2019-01-29 RX ORDER — LABETALOL HCL 100 MG
1 TABLET ORAL
Qty: 60 | Refills: 0 | OUTPATIENT
Start: 2019-01-29 | End: 2019-02-27

## 2019-01-29 RX ADMIN — Medication 600 MILLIGRAM(S): at 11:46

## 2019-01-29 RX ADMIN — SIMETHICONE 80 MILLIGRAM(S): 80 TABLET, CHEWABLE ORAL at 00:12

## 2019-01-29 RX ADMIN — OXYCODONE AND ACETAMINOPHEN 1 TABLET(S): 5; 325 TABLET ORAL at 00:42

## 2019-01-29 RX ADMIN — ENOXAPARIN SODIUM 40 MILLIGRAM(S): 100 INJECTION SUBCUTANEOUS at 11:46

## 2019-01-29 RX ADMIN — Medication 300 MILLIGRAM(S): at 17:37

## 2019-01-29 RX ADMIN — OXYCODONE AND ACETAMINOPHEN 1 TABLET(S): 5; 325 TABLET ORAL at 05:54

## 2019-01-29 RX ADMIN — Medication 1 TABLET(S): at 11:48

## 2019-01-29 RX ADMIN — OXYCODONE AND ACETAMINOPHEN 1 TABLET(S): 5; 325 TABLET ORAL at 00:12

## 2019-01-29 RX ADMIN — SIMETHICONE 80 MILLIGRAM(S): 80 TABLET, CHEWABLE ORAL at 05:56

## 2019-01-29 RX ADMIN — Medication 325 MILLIGRAM(S): at 11:45

## 2019-01-29 RX ADMIN — Medication 650 MILLIGRAM(S): at 17:39

## 2019-01-29 RX ADMIN — Medication 300 MILLIGRAM(S): at 05:55

## 2019-01-29 RX ADMIN — SIMETHICONE 80 MILLIGRAM(S): 80 TABLET, CHEWABLE ORAL at 17:39

## 2019-01-29 RX ADMIN — Medication 100 MILLIGRAM(S): at 11:45

## 2019-01-29 NOTE — PROGRESS NOTE ADULT - SUBJECTIVE AND OBJECTIVE BOX
PGY 4 Post Partum note    Subjective: pt seen and evaluated at bedside s/p CT abd/pelvis (no acute abdominal abnormality, see below). Patient abdomen re-evaluated, pain mildly improved, but . Denies CP, SOB, nausea, vomiting, fevers or chills. Ambulating without difficulty. Breast feeding. Voided. No Bowel movement. Denies headaches. blurry vision, RUQ/epigastric or vaginal bleeding.    Physical exam:    Vital Signs Last 24 Hrs  T(C): 36.9 (2019 06:04), Max: 36.9 (2019 06:04)  T(F): 98.5 (2019 06:04), Max: 98.5 (2019 06:04)  HR: 70 (2019 06:04) (70 - 76)  BP: 139/77 (2019 06:04) (122/60 - 173/79)  RR: 20 (2019 06:04) (18 - 20)  SpO2: 95% (2019 06:04) (95% - 95%)    Gen: NAD, AAOx3   CVS: s1s2, rrr  Lungs: ctab, no rhonchi or rales  Abdomen: Soft, appropriate postoperative tenderness, staples at bottom of incision removed, no drainage noted from site. Mild erythema along incision, no fluctuance or crepitus.   Pelvic: Normal lochia noted  Ext: No calf tenderness    Diet: regular  meds:   acetaminophen   Tablet ..   650 milliGRAM(s) Oral (19 @ 18:21)   650 milliGRAM(s) Oral (19 @ 12:26)    enoxaparin Injectable   40 milliGRAM(s) SubCutaneous (19 @ 12:27)    ferrous    sulfate   325 milliGRAM(s) Oral (19 @ 12:28)    ibuprofen  Tablet.   600 milliGRAM(s) Oral (19 @ 08:04)    labetalol   300 milliGRAM(s) Oral (19 @ 05:55)   300 milliGRAM(s) Oral (19 @ 18:20)    oxyCODONE    5 mG/acetaminophen 325 mG   1 Tablet(s) Oral (19 @ 05:54)   1 Tablet(s) Oral (19 @ 00:12)    prenatal multivitamin   1 Tablet(s) Oral (19 @ 12:23)    simethicone   80 milliGRAM(s) Chew (19 @ 05:56)   80 milliGRAM(s) Chew (19 @ 00:12)   80 milliGRAM(s) Chew (19 @ 18:21)   80 milliGRAM(s) Chew (19 @ 12:28)        LABS:                        10.7   8.55  )-----------( 265      ( 2019 02:50 )             33.3       19 @ 02:50      140  |  102  |  10  ----------------------------<  77  4.0   |  22  |  0.6<L>    Ca    9.1      2019 02:50    Imaging:    < from: CT Abdomen and Pelvis w/ IV Cont (19 @ 03:58) >    ******PRELIMINARY REPORT******    ******PRELIMINARY REPORT******          EXAM:  CT ABDOMEN AND PELVIS IC            PROCEDURE DATE:  2019          ******PRELIMINARY REPORT******    ******PRELIMINARY REPORT******          INTERPRETATION:  CLINICAL STATEMENT: Abdominal pain. Recent post   .      TECHNIQUE: Contiguous axial CT images were obtained from the lower chest   to the pubic symphysis following administration of 100cc Optiray 320   intravenous contrast.  Oral contrast was not administered.  Reformatted   images in the coronal and sagittal planes were acquired.    COMPARISON CT: None.      FINDINGS:    LOWER CHEST: Bibasilar subsegmental atelectasis.    HEPATOBILIARY: Unremarkable.    SPLEEN: Unremarkable.    PANCREAS: Unremarkable.    ADRENAL GLANDS: Unremarkable.    KIDNEYS: Symmetric enhancement bilaterally. No hydronephrosis.    ABDOMINOPELVIC NODES: No lymphadenopathy.    PELVIC ORGANS: Status post  section, with postoperative changes   within the uterusand anterior abdominal wall. There is small amount of   pelvic free fluid and stranding/edema of the peritoneal fat which is   likely postoperative. No evidence of abscess.    PERITONEUM/MESENTERY/BOWEL: No evidence of bowel obstruction, ascites or   pneumoperitoneum.    BONES/SOFT TISSUES: Mild degenerative changes of the spine.      IMPRESSION:        Status post  section, with postoperative changes within the   uterus and anterior abdominal wall with mild edema of fascia; no evidence   of abscess.    Small amount of pelvic free fluid and stranding/edema of the peritoneal   fat which is likely postoperative.            ******PRELIMINARY REPORT******    ******PRELIMINARY REPORT******          NANCY SHARPE M.D., RESIDENT RADIOLOGIST                      < end of copied text >

## 2019-01-29 NOTE — PROGRESS NOTE ADULT - SUBJECTIVE AND OBJECTIVE BOX
PGY 4 Post Partum note    Pt seen and evaluated at bedside, pt c/o abdominal pain, sharp in nature, localized to mid incision, started after shower this afternoon. Did not have this prior to today. Denies headaches, blurry vision, nausea, vomiting, confusion, dizziness, fevers, or chills. BPs better controlled with PO meds.     Physical exam:    Vital Signs Last 24 Hrs  T(C): 36.4 (28 Jan 2019 23:32), Max: 36.8 (28 Jan 2019 21:05)  T(F): 97.6 (28 Jan 2019 23:32), Max: 98.2 (28 Jan 2019 21:05)  HR: 70 (28 Jan 2019 23:32) (68 - 80)  BP: 132/63 (28 Jan 2019 23:32) (122/60 - 169/81)  RR: 20 (28 Jan 2019 23:32) (18 - 20)    Gen: NAD, AAOx3  Abdomen: dressing removed mild incisional erythema, mid incision with 5-6 cm in length and 2 cm in width indurated area, nonfluctuant mild tenderness, no drainage from the incision site, staples intact, no deep tenderness to palpation    Given patients recent surgical procedure, h/o umbilical hernia, and medical comorbidities, concern for abdominal abscess v. hernia. Pt is presently afebrile. Will attempt to place new IV line and redraw labs, will send for CT abd/pelvis.     Will inform OB attending

## 2019-01-29 NOTE — PROGRESS NOTE ADULT - ASSESSMENT
39 yo P3 s/p repeat  w/ vertical incision, POD#4, w/ cHTN w/ superimposed preeclampsia, s/p Mg, on labetalol 300mg BID, s/p IVP hydralazine + stat procardia, w/ BPs oscillating between elevated and severe, no evidence for abscess or intraabdominal pathology, now stable    - will consider changing med regimen to obtain better BP control  - pain management PRN  - c/w regular diet, PO hydration  - encourage ambulation, incentive spirometry  - lovenox for DVT ppx  - monitor BPs  - possible d/c home with VNS if BPs stable today    will inform OB attending

## 2019-01-30 VITALS
SYSTOLIC BLOOD PRESSURE: 132 MMHG | TEMPERATURE: 98 F | DIASTOLIC BLOOD PRESSURE: 74 MMHG | HEART RATE: 67 BPM | RESPIRATION RATE: 20 BRPM

## 2019-01-30 RX ORDER — LABETALOL HCL 100 MG
1 TABLET ORAL
Qty: 90 | Refills: 0 | OUTPATIENT
Start: 2019-01-30 | End: 2019-02-28

## 2019-01-30 RX ORDER — LABETALOL HCL 100 MG
1 TABLET ORAL
Qty: 90 | Refills: 0
Start: 2019-01-30 | End: 2019-02-28

## 2019-01-30 RX ORDER — AZTREONAM 2 G
1 VIAL (EA) INJECTION
Qty: 12 | Refills: 0 | OUTPATIENT
Start: 2019-01-30 | End: 2019-02-04

## 2019-01-30 RX ADMIN — SIMETHICONE 80 MILLIGRAM(S): 80 TABLET, CHEWABLE ORAL at 12:54

## 2019-01-30 RX ADMIN — Medication 650 MILLIGRAM(S): at 05:23

## 2019-01-30 RX ADMIN — Medication 1 TABLET(S): at 09:04

## 2019-01-30 RX ADMIN — Medication 650 MILLIGRAM(S): at 05:53

## 2019-01-30 RX ADMIN — Medication 200 MILLIGRAM(S): at 14:07

## 2019-01-30 RX ADMIN — Medication 650 MILLIGRAM(S): at 00:46

## 2019-01-30 RX ADMIN — Medication 1 TABLET(S): at 12:49

## 2019-01-30 RX ADMIN — ENOXAPARIN SODIUM 40 MILLIGRAM(S): 100 INJECTION SUBCUTANEOUS at 12:49

## 2019-01-30 RX ADMIN — Medication 325 MILLIGRAM(S): at 12:48

## 2019-01-30 RX ADMIN — Medication 650 MILLIGRAM(S): at 14:08

## 2019-01-30 RX ADMIN — SIMETHICONE 80 MILLIGRAM(S): 80 TABLET, CHEWABLE ORAL at 00:47

## 2019-01-30 RX ADMIN — SIMETHICONE 80 MILLIGRAM(S): 80 TABLET, CHEWABLE ORAL at 05:23

## 2019-01-30 RX ADMIN — Medication 650 MILLIGRAM(S): at 01:16

## 2019-01-30 RX ADMIN — Medication 200 MILLIGRAM(S): at 05:20

## 2019-01-30 NOTE — PROGRESS NOTE ADULT - SUBJECTIVE AND OBJECTIVE BOX
PGY 4 Post Partum note    Subjective: pt seen and evaluated at bedside due to elevated BP (164/77). Advised nursing staff to start Labetalol 200 mg TID now. Reassessed patient and BP wa 153/72. Patient re-examined and incision has 1x1 cm defect in the upper portion of the incision, some mild, dark discharge, non foul smelling to patient, denies abdominal tenderness, improving post operative pain; denies fevers, chills, nausea, vomiting, confusion, dizziness. Reports breast feeding. Ambulating without difficulty. Voided and had BM    Physical exam:    Vital Signs Last 24 Hrs  T(C): 36.3 (30 Jan 2019 00:57), Max: 36.9 (29 Jan 2019 11:29)  T(F): 97.3 (30 Jan 2019 00:57), Max: 98.5 (29 Jan 2019 11:29)  HR: 63 (30 Jan 2019 04:06) (63 - 76)  BP: 153/72 (30 Jan 2019 05:35) (124/60 - 164/77)  RR: 19 (30 Jan 2019 00:57) (18 - 20)    Gen: NAD, AAOx3  CVS: s1s2, rrr  Lungs: ctab, no rhonchi or rales  Abdomen: staples removed, minimal clear drainage from lower segment, but 1x1 cm defect in upper part of incision has some clear mixed with dark blood drainage, non foul smelling. Qtip used to assess wound defect, 2 cm in depth, under lying fascia intact. Remainder of incision intact. Packing placed (1/2" packing). Cellulitic reaction around incision, spread 3-4 cm around, non tender, mildly erythematous. No purulent discharge. Fundus below umbilicus. Remainder of incision with granulation tissue.   Pelvic: Normal lochia noted  Ext: No calf tenderness    Diet: regular     meds:   acetaminophen   Tablet ..   650 milliGRAM(s) Oral (01-30-19 @ 05:23)   650 milliGRAM(s) Oral (01-30-19 @ 00:46)   650 milliGRAM(s) Oral (01-29-19 @ 17:39)    docusate sodium   100 milliGRAM(s) Oral (01-29-19 @ 11:45)    enoxaparin Injectable   40 milliGRAM(s) SubCutaneous (01-29-19 @ 11:46)    ferrous    sulfate   325 milliGRAM(s) Oral (01-29-19 @ 11:45)    ibuprofen  Tablet.   600 milliGRAM(s) Oral (01-29-19 @ 11:46)    labetalol   200 milliGRAM(s) Oral (01-30-19 @ 05:20)    labetalol   300 milliGRAM(s) Oral (01-29-19 @ 17:37)    prenatal multivitamin   1 Tablet(s) Oral (01-29-19 @ 11:48)    simethicone   80 milliGRAM(s) Chew (01-30-19 @ 05:23)   80 milliGRAM(s) Chew (01-30-19 @ 00:47)   80 milliGRAM(s) Chew (01-29-19 @ 17:39)        LABS:                        10.7   8.55  )-----------( 265      ( 29 Jan 2019 02:50 )             33.3       01-29-19 @ 02:50      140  |  102  |  10  ----------------------------<  77  4.0   |  22  |  0.6<L>        Ca    9.1      29 Jan 2019 02:50

## 2019-01-30 NOTE — PROGRESS NOTE ADULT - ASSESSMENT
37 yo P3 s/p repeat  w/ vertical incision, POD#6, w/ cHTN w/ superimposed preeclampsia, s/p Mg, on labetalol 300mg BID, s/p IVP hydralazine + stat procardia, w/ BPs oscillating between elevated and severe, no evidence for abscess or intraabdominal pathology, with incisional defect and cellulitic reaction, presently hemodynamically and clinically stable,   - starting Labetalol 200 mg TID  - pain management PRN  - c/w regular diet, PO hydration  - encourage ambulation, incentive spirometry  - lovenox for DVT ppx  - monitor BPs  - will start Bactrim DS BID for 7 days for wound infection; requires incision dressing changes, will require VNS management for wound in outpatient  - possible d/c home with VNS if BPs stable today    will inform OB attending

## 2019-01-31 ENCOUNTER — OTHER (OUTPATIENT)
Age: 39
End: 2019-01-31

## 2019-02-04 PROBLEM — E66.9 OBESITY, UNSPECIFIED: Chronic | Status: ACTIVE | Noted: 2019-01-24

## 2019-02-05 DIAGNOSIS — O34.211 MATERNAL CARE FOR LOW TRANSVERSE SCAR FROM PREVIOUS CESAREAN DELIVERY: ICD-10-CM

## 2019-02-05 DIAGNOSIS — Z33.1 PREGNANT STATE, INCIDENTAL: ICD-10-CM

## 2019-02-05 DIAGNOSIS — K80.20 CALCULUS OF GALLBLADDER WITHOUT CHOLECYSTITIS WITHOUT OBSTRUCTION: ICD-10-CM

## 2019-02-05 DIAGNOSIS — Z3A.38 38 WEEKS GESTATION OF PREGNANCY: ICD-10-CM

## 2019-02-05 DIAGNOSIS — I16.1 HYPERTENSIVE EMERGENCY: ICD-10-CM

## 2019-02-05 DIAGNOSIS — K46.9 UNSPECIFIED ABDOMINAL HERNIA WITHOUT OBSTRUCTION OR GANGRENE: ICD-10-CM

## 2019-02-06 ENCOUNTER — APPOINTMENT (OUTPATIENT)
Dept: ANTEPARTUM | Facility: CLINIC | Age: 39
End: 2019-02-06

## 2019-02-06 ENCOUNTER — OUTPATIENT (OUTPATIENT)
Dept: OUTPATIENT SERVICES | Facility: HOSPITAL | Age: 39
LOS: 1 days | Discharge: HOME | End: 2019-02-06

## 2019-02-06 VITALS
TEMPERATURE: 98.1 F | HEART RATE: 73 BPM | DIASTOLIC BLOOD PRESSURE: 76 MMHG | OXYGEN SATURATION: 99 % | SYSTOLIC BLOOD PRESSURE: 127 MMHG

## 2019-02-06 DIAGNOSIS — Z98.890 OTHER SPECIFIED POSTPROCEDURAL STATES: Chronic | ICD-10-CM

## 2019-02-08 NOTE — HISTORY OF PRESENT ILLNESS
[Postpartum Follow Up] : postpartum follow up [Delivery Date: ___] : on [unfilled] [Repeat C/S] : delivered by  section (repeat) [Male] : Delivery History: baby boy [Wt. ___] : weighing [unfilled] [Discharge HCT: ___] : hematocrit level was [unfilled] [Discharge HGB: ___] : hemoglobin level was [unfilled] [Resumed Menses] : has not resumed her menses [Resumed [place]] : has not resumed intercourse [S/Sx PP Depression] : no signs/symptoms of postpartum depression [Chills] : no chills [Fever] : no fever [Headache] : no headache [Nausea] : no nausea [Vomiting] : no vomiting [FreeTextEntry8] : Wound check [FreeTextEntry9] : ERICTN on labetalol [de-identified] : CHTN with superimposed severe preeclampsia s/p postpartum magnesium, C/S #3 with vertical skin incision with low transverse uterine incision complicated with wound separation s/p bactrim course [de-identified] : VNS for BP checks (140-150s/80-90s), BP today WNL, and wound care (daily packing and dressing changes) [de-identified] : PP 1/25/19 s/p C/S #3 complicated by wound separation (VNS to come now QOD), CHTN w superimposed severe preeclampsia s/p magnesium, now on Labetalol 200mg TID [de-identified] : Continue BP meds as is, VNS to come QOD for dressing changes, RTC 1 wk for wound check, precautions given

## 2019-02-13 ENCOUNTER — APPOINTMENT (OUTPATIENT)
Dept: ANTEPARTUM | Facility: CLINIC | Age: 39
End: 2019-02-13

## 2019-02-13 ENCOUNTER — OUTPATIENT (OUTPATIENT)
Dept: OUTPATIENT SERVICES | Facility: HOSPITAL | Age: 39
LOS: 1 days | Discharge: HOME | End: 2019-02-13

## 2019-02-13 VITALS
SYSTOLIC BLOOD PRESSURE: 154 MMHG | TEMPERATURE: 97.9 F | HEART RATE: 68 BPM | OXYGEN SATURATION: 97 % | DIASTOLIC BLOOD PRESSURE: 75 MMHG

## 2019-02-13 DIAGNOSIS — O10.913 UNSPECIFIED PRE-EXISTING HYPERTENSION COMPLICATING PREGNANCY, THIRD TRIMESTER: ICD-10-CM

## 2019-02-13 DIAGNOSIS — Z98.890 OTHER SPECIFIED POSTPROCEDURAL STATES: Chronic | ICD-10-CM

## 2019-03-06 ENCOUNTER — APPOINTMENT (OUTPATIENT)
Dept: ANTEPARTUM | Facility: CLINIC | Age: 39
End: 2019-03-06

## 2019-03-06 ENCOUNTER — OUTPATIENT (OUTPATIENT)
Dept: OUTPATIENT SERVICES | Facility: HOSPITAL | Age: 39
LOS: 1 days | Discharge: HOME | End: 2019-03-06

## 2019-03-06 VITALS
SYSTOLIC BLOOD PRESSURE: 149 MMHG | OXYGEN SATURATION: 97 % | HEART RATE: 63 BPM | DIASTOLIC BLOOD PRESSURE: 78 MMHG | BODY MASS INDEX: 45.49 KG/M2 | WEIGHT: 265 LBS | TEMPERATURE: 97.9 F

## 2019-03-06 DIAGNOSIS — Z98.890 OTHER SPECIFIED POSTPROCEDURAL STATES: Chronic | ICD-10-CM

## 2019-03-06 NOTE — HISTORY OF PRESENT ILLNESS
[Postpartum Follow Up] : postpartum follow up [Delivery Date: ___] : on [unfilled] [Repeat C/S] : delivered by  section (repeat) [Resumed Menses] : has resumed her menses [Intended Contraception] : Intended Contraception: [IUD] : intrauterine device [Clean/Dry/Intact] : clean, dry and intact [Erythema] : erythematous [Back to Normal] : is back to normal in size [None] : no vaginal bleeding [Normal] : the vagina was normal [Examination Of The Breasts] : breasts are normal [Doing Well] : is doing well [No Sign of Infection] : is showing no signs of infection [Sutures Removed] : sutures were removed [Complications:___] : no complications [Breastfeeding] : not currently nursing [Resumed [place]] : has not resumed intercourse [Abdominal Pain] : no abdominal pain [Breast Pain] : no breast pain [Chest Pain] : no chest pain [S/Sx PP Depression] : no signs/symptoms of postpartum depression [Heavy Bleeding] : no heavy bleeding [Incisional Drainage] : no incisional drainage [Incisional Pain] : no incisional pain [Irregular Bleeding] : no irregular bleeding [Leg Pain] : no leg pain [Shortness of Breath] : no shortness of breath [Suicidal Ideation] : no suicidal ideation [Vaginal Discharge] : no vaginal discharge [Chills] : no chills [Fatigue] : no fatigue [Dysuria] : no dysuria [Fever] : no fever [Headache] : no headache [Nausea] : no nausea [Vomiting] : no vomiting [Swelling] : not swollen [Dehiscence] : not dehisced [Hematoma] : no vaginal hematoma [Abscess Formation] : no vaginal abscess [de-identified] : Paragard [de-identified] : abd nontender,no r/g/r, vertical incision closed without evidence of cellulitis or defect/dehiscence. Some erythema around in lower 1/3 of incision with piece of monocryl suture sticking out. NO flunctuance.  [de-identified] : s/p repeat c/s with vertical incision complicated by CHTN with superimposed severe preeclampsia, now on labetalol 200mg TID, incision healing well, no wound separation or signs of cellulitis, small piece of suture removed. Desires paragard IUD.  [de-identified] : continue with VNS, continue BP meds and f/u with PCP, RTC in low risk clinic on friday for paragard placement and final wound check.

## 2019-03-25 ENCOUNTER — APPOINTMENT (OUTPATIENT)
Dept: OBGYN | Facility: CLINIC | Age: 39
End: 2019-03-25

## 2019-09-06 ENCOUNTER — EMERGENCY (EMERGENCY)
Facility: HOSPITAL | Age: 39
LOS: 0 days | Discharge: HOME | End: 2019-09-07
Attending: EMERGENCY MEDICINE | Admitting: EMERGENCY MEDICINE
Payer: SUBSIDIZED

## 2019-09-06 VITALS
OXYGEN SATURATION: 98 % | TEMPERATURE: 98 F | DIASTOLIC BLOOD PRESSURE: 96 MMHG | WEIGHT: 270.07 LBS | SYSTOLIC BLOOD PRESSURE: 203 MMHG | HEART RATE: 87 BPM | RESPIRATION RATE: 16 BRPM

## 2019-09-06 DIAGNOSIS — R10.9 UNSPECIFIED ABDOMINAL PAIN: ICD-10-CM

## 2019-09-06 DIAGNOSIS — N83.201 UNSPECIFIED OVARIAN CYST, RIGHT SIDE: ICD-10-CM

## 2019-09-06 DIAGNOSIS — R11.2 NAUSEA WITH VOMITING, UNSPECIFIED: ICD-10-CM

## 2019-09-06 DIAGNOSIS — R10.11 RIGHT UPPER QUADRANT PAIN: ICD-10-CM

## 2019-09-06 DIAGNOSIS — R10.12 LEFT UPPER QUADRANT PAIN: ICD-10-CM

## 2019-09-06 DIAGNOSIS — Z98.890 OTHER SPECIFIED POSTPROCEDURAL STATES: Chronic | ICD-10-CM

## 2019-09-06 LAB
APPEARANCE UR: CLEAR — SIGNIFICANT CHANGE UP
BACTERIA # UR AUTO: ABNORMAL
BASOPHILS # BLD AUTO: 0.04 K/UL — SIGNIFICANT CHANGE UP (ref 0–0.2)
BASOPHILS NFR BLD AUTO: 0.3 % — SIGNIFICANT CHANGE UP (ref 0–1)
BILIRUB UR-MCNC: NEGATIVE — SIGNIFICANT CHANGE UP
COLOR SPEC: SIGNIFICANT CHANGE UP
DIFF PNL FLD: NEGATIVE — SIGNIFICANT CHANGE UP
EOSINOPHIL # BLD AUTO: 0.2 K/UL — SIGNIFICANT CHANGE UP (ref 0–0.7)
EOSINOPHIL NFR BLD AUTO: 1.6 % — SIGNIFICANT CHANGE UP (ref 0–8)
EPI CELLS # UR: 0 /HPF — SIGNIFICANT CHANGE UP (ref 0–5)
GLUCOSE UR QL: NEGATIVE — SIGNIFICANT CHANGE UP
HCT VFR BLD CALC: 39.3 % — SIGNIFICANT CHANGE UP (ref 37–47)
HGB BLD-MCNC: 13.1 G/DL — SIGNIFICANT CHANGE UP (ref 12–16)
HYALINE CASTS # UR AUTO: 0 /LPF — SIGNIFICANT CHANGE UP (ref 0–7)
IMM GRANULOCYTES NFR BLD AUTO: 0.4 % — HIGH (ref 0.1–0.3)
KETONES UR-MCNC: NEGATIVE — SIGNIFICANT CHANGE UP
LEUKOCYTE ESTERASE UR-ACNC: NEGATIVE — SIGNIFICANT CHANGE UP
LYMPHOCYTES # BLD AUTO: 26.9 % — SIGNIFICANT CHANGE UP (ref 20.5–51.1)
LYMPHOCYTES # BLD AUTO: 3.29 K/UL — SIGNIFICANT CHANGE UP (ref 1.2–3.4)
MCHC RBC-ENTMCNC: 29 PG — SIGNIFICANT CHANGE UP (ref 27–31)
MCHC RBC-ENTMCNC: 33.3 G/DL — SIGNIFICANT CHANGE UP (ref 32–37)
MCV RBC AUTO: 87.1 FL — SIGNIFICANT CHANGE UP (ref 81–99)
MONOCYTES # BLD AUTO: 0.78 K/UL — HIGH (ref 0.1–0.6)
MONOCYTES NFR BLD AUTO: 6.4 % — SIGNIFICANT CHANGE UP (ref 1.7–9.3)
NEUTROPHILS # BLD AUTO: 7.88 K/UL — HIGH (ref 1.4–6.5)
NEUTROPHILS NFR BLD AUTO: 64.4 % — SIGNIFICANT CHANGE UP (ref 42.2–75.2)
NITRITE UR-MCNC: NEGATIVE — SIGNIFICANT CHANGE UP
NRBC # BLD: 0 /100 WBCS — SIGNIFICANT CHANGE UP (ref 0–0)
PH UR: 8 — SIGNIFICANT CHANGE UP (ref 5–8)
PLATELET # BLD AUTO: 215 K/UL — SIGNIFICANT CHANGE UP (ref 130–400)
PROT UR-MCNC: ABNORMAL
RBC # BLD: 4.51 M/UL — SIGNIFICANT CHANGE UP (ref 4.2–5.4)
RBC # FLD: 14.2 % — SIGNIFICANT CHANGE UP (ref 11.5–14.5)
RBC CASTS # UR COMP ASSIST: 0 /HPF — SIGNIFICANT CHANGE UP (ref 0–4)
SP GR SPEC: 1.02 — SIGNIFICANT CHANGE UP (ref 1.01–1.02)
UROBILINOGEN FLD QL: SIGNIFICANT CHANGE UP
WBC # BLD: 12.24 K/UL — HIGH (ref 4.8–10.8)
WBC # FLD AUTO: 12.24 K/UL — HIGH (ref 4.8–10.8)
WBC UR QL: 1 /HPF — SIGNIFICANT CHANGE UP (ref 0–5)

## 2019-09-06 PROCEDURE — 99285 EMERGENCY DEPT VISIT HI MDM: CPT

## 2019-09-06 RX ORDER — ONDANSETRON 8 MG/1
4 TABLET, FILM COATED ORAL ONCE
Refills: 0 | Status: COMPLETED | OUTPATIENT
Start: 2019-09-06 | End: 2019-09-06

## 2019-09-06 RX ORDER — MORPHINE SULFATE 50 MG/1
4 CAPSULE, EXTENDED RELEASE ORAL ONCE
Refills: 0 | Status: DISCONTINUED | OUTPATIENT
Start: 2019-09-06 | End: 2019-09-06

## 2019-09-06 RX ADMIN — MORPHINE SULFATE 4 MILLIGRAM(S): 50 CAPSULE, EXTENDED RELEASE ORAL at 23:59

## 2019-09-06 RX ADMIN — ONDANSETRON 4 MILLIGRAM(S): 8 TABLET, FILM COATED ORAL at 23:59

## 2019-09-06 NOTE — ED PROVIDER NOTE - CLINICAL SUMMARY MEDICAL DECISION MAKING FREE TEXT BOX
pt s/o to me by Dr. Nassar - 39y f p/w abd pain, found with thickened endometrium & 5cm ovarian cyst - ObGyn consulted, rec outpt f/u - all results d/w pt & copies given, strict return precautions discussed, rec outpt ObGyn f/u

## 2019-09-06 NOTE — ED PROVIDER NOTE - ATTENDING CONTRIBUTION TO CARE
pt co n, v, nbnb, diffuse ab pain. x2 days. non rad. no fever, chills. no cp or sob. pt in nad, diffusely tender but soft, nd, no guarding or rebound. no cvat.  no vaginal bleeding or dc.   will get labs and imaging.

## 2019-09-06 NOTE — ED PROVIDER NOTE - PROGRESS NOTE DETAILS
Patient sleeping comfortably Spoke to yesenia PIMENETL to follow up outpatient in the clinic. Pt has been seen in the clinic before Authored by Dr. Preciado. Discussed with patient labs and imaging. Discussed need to follow up with OBGYN CLINIC. Discussed signs and symptoms to return to the ED for. Pt understands and agrees with discharge plan.

## 2019-09-06 NOTE — ED PROVIDER NOTE - NSFOLLOWUPINSTRUCTIONS_ED_ALL_ED_FT
Quiste ovárico  Ovarian Cyst  Introducción  Un quiste ovárico es miranda bolsa llena de líquido que se forma en el ovario. Los ovarios son órganos de las mujeres que producen óvulos. La mayoría de los quistes ováricos desaparecen por sí solos y no son cancerosos (son benignos). Otros quistes necesitan tratamiento.    Siga estas indicaciones en tatum casa:  [place] los medicamentos de venta airam y los recetados solamente claudia se lo haya indicado el médico.  No conduzca ni use maquinaria pesada mientras malik analgésicos recetados.  Realícese exámenes pélvicos y pruebas de Papanicolaou con la frecuencia que le haya indicado el médico.  Retome chantell actividades habituales claudia se lo haya indicado el médico. Pregúntele al médico qué actividades son seguras para usted.  No consuma ningún producto que contenga nicotina o tabaco, claudia cigarrillos y cigarrillos electrónicos. Si necesita ayuda para dejar de fumar, consulte al médico.  Concurra a todas las visitas de control claudia se lo haya indicado el médico. [place] es importante.  Comuníquese con un médico si:  Los períodos menstruales:Image  Se retrasan.  Son irregulares.  Son dolorosos.  Chantell períodos cesan.  Tiene dolor pélvico que no desaparece.  Siente presión en la vejiga.  Tiene dificultad para vaciar la vejiga cuando orina.  Siente dolor gael las relaciones sexuales.  Le aparece alguno de los siguientes síntomas en el abdomen:  Sensación de tener el estómago lleno.  Presión.  Molestias.  Dolor que persiste.  Hinchazón.  Se siente mal constantemente.  Tiene dificultades para defecar (estreñimiento).  No tiene el apetito habitual (pierde el apetito).  Tiene un acné muy grave.  Nota un aumento del vello corporal y facial.  Aumenta o disminuye de peso sin hacer modificaciones en tatum actividad física y en tatum dieta habitual.  Lou que puede estar embarazada.  Solicite ayuda de inmediato si:  Siente en el abdomen un dolor muy intenso o que empeora.  No puede comer ni beber sin vomitar.  Súbitamente tiene fiebre.  Los períodos menstruales son más abundantes de lo habitual.  Esta información no tiene claudia fin reemplazar el consejo del médico. Asegúrese de hacerle al médico cualquier pregunta que tenga.    Dolor abdominal en los adultos  Abdominal Pain, Adult  Introducción  Image   El dolor de estómago (abdominal) puede tener muchas causas. La mayoría de las veces, el dolor de estómago no es peligroso. Muchos de estos casos de dolor de estómago pueden controlarse y tratarse en casa. Sin embargo, a veces, el dolor de estómago puede ser grave. El médico intentará descubrir la causa del dolor de estómago.    Siga estas indicaciones en tatum casa:  [place] los medicamentos de venta airam y los recetados solamente claudia se lo haya indicado el médico. No tome medicamentos que lo ayuden a defecar (laxantes), jj que el médico se lo indique.  Sharonda suficiente líquido para mantener el pis (orina) gautam o de color amarillo pálido.  Esté atento al dolor de estómago para detectar cualquier cambio.  Concurra a todas las visitas de control claudia se lo haya indicado el médico. [place] es importante.  Comuníquese con un médico si:  El dolor de estómago cambia o empeora.  No tiene apetito o baja de peso sin proponérselo.  Tiene dificultades para defecar (está estreñido) o heces líquidas (diarrea) gael más de 2 o 3 días.  Siente dolor al orinar o defecar.  El dolor de estómago lo despierta de noche.  El dolor empeora con las comidas, después de comer o con determinados alimentos.  Vomita y no puede retener nada de lo que ingiere.  Tiene fiebre.  Solicite ayuda de inmediato si:  El dolor no desaparece en el tiempo indicado por el médico.  No puede detener los vómitos.  Siente dolor solamente en zonas específicas del abdomen, claudia el lado derecho o la parte inferior izquierda.  Tiene heces con paolo, de color lillian o con aspecto alquitranado.  Tiene dolor muy intenso en el vientre, cólicos o meteorismo.  Presenta signos de no tener suficientes líquidos o agua en el cuerpo (deshidratación), por ejemplo:  La orina es oscura, es muy escasa o no orina.  Labios agrietados.  Boca seca.  Ojos hundidos.  Somnolencia.  Debilidad.  Esta información no tiene claudia fin reemplazar el consejo del médico. Asegúrese de hacerle al médico cualquier pregunta que tenga.

## 2019-09-06 NOTE — ED PROVIDER NOTE - NS ED ROS FT
Eyes:  No visual changes, eye pain or discharge.  ENMT:  No hearing changes, pain, discharge or infections. No neck pain or stiffness.  Cardiac:  No chest pain, SOB or edema.   Respiratory:  No cough or respiratory distress.   GI:  Abd pain, nausea, vomiting. No constipation, diarrhea, obstipation.   :  No dysuria, frequency or burning.  MS:  No myalgia, muscle weakness, joint pain or back pain.  Neuro:  No headache or weakness.  No LOC.  Skin:  No skin rash.   Endocrine: No history of thyroid disease or diabetes.

## 2019-09-06 NOTE — ED PROVIDER NOTE - PATIENT PORTAL LINK FT
You can access the FollowMyHealth Patient Portal offered by [facility] by registering at the following website: [email or web address] By joining [facility]’s FollowMyHealth portal, you will also be able to view your health information using other applications (apps) compatible with our system.

## 2019-09-06 NOTE — ED PROVIDER NOTE - OBJECTIVE STATEMENT
1 on 1 observation.   39 y f no pmh pw abd pain. Diffuse abd pain for the past 2 days, sharp, intermittent. Worse over RUQ with radiation to LUQ and lower quadrants. Associated with 2 episodes nbnb vomiting. Worse with eating. Denies fever, chills, back pain, urinary sx, vaginal discharge, diarrhea, constipation, cp, sob.

## 2019-09-06 NOTE — ED ADULT NURSE NOTE - OBJECTIVE STATEMENT
pt complaining of right side abdominal pain that gets worse after she eats. pt also complaining of nausea and vomiting.

## 2019-09-06 NOTE — ED PROVIDER NOTE - NSFOLLOWUPCLINICS_GEN_ALL_ED_FT
[facility]  OB/GYN  [address] [place] [address]  Phone: [phone]  Fax:   Follow Up Time: 4-6 Days

## 2019-09-06 NOTE — ED PROVIDER NOTE - PHYSICAL EXAMINATION
CONSTITUTIONAL: Well-developed; well-nourished; in no acute distress.   SKIN: warm, dry  HEAD: Normocephalic; atraumatic.  EYES: PERRL, EOMI, normal sclera and conjunctiva   ENT: No nasal discharge; airway clear.  NECK: Supple; non tender.  CARD: S1, S2 normal; no murmurs, gallops, or rubs. Regular rate and rhythm.   RESP: No wheezes, rales or rhonchi.  ABD: Soft, nondistended. TTP diffusely with no rebound/guarding. +gonzales's sign. No cva tenderness.   EXT: Normal ROM.  No clubbing, cyanosis or edema.   LYMPH: No acute cervical adenopathy.  NEURO: Alert, oriented, grossly unremarkable  PSYCH: Cooperative, appropriate.

## 2019-09-06 NOTE — ED ADULT NURSE NOTE - NSIMPLEMENTINTERV_GEN_ALL_ED
Implemented All Universal Safety Interventions:  [place] to call system. Call bell, personal items and telephone within reach. Instruct patient to call for assistance. Room bathroom lighting operational. Non-slip footwear when patient is off stretcher. Physically safe environment: no spills, clutter or unnecessary equipment. Stretcher in lowest position, wheels locked, appropriate side rails in place.

## 2019-09-07 VITALS
OXYGEN SATURATION: 99 % | DIASTOLIC BLOOD PRESSURE: 93 MMHG | TEMPERATURE: 97 F | RESPIRATION RATE: 18 BRPM | SYSTOLIC BLOOD PRESSURE: 166 MMHG | HEART RATE: 65 BPM

## 2019-09-07 LAB
ALBUMIN SERPL ELPH-MCNC: 4.2 G/DL — SIGNIFICANT CHANGE UP (ref 3.5–5.2)
ALP SERPL-CCNC: 92 U/L — SIGNIFICANT CHANGE UP (ref 30–115)
ALT FLD-CCNC: 36 U/L — SIGNIFICANT CHANGE UP (ref 0–41)
ANION GAP SERPL CALC-SCNC: 14 MMOL/L — SIGNIFICANT CHANGE UP (ref 7–14)
AST SERPL-CCNC: 23 U/L — SIGNIFICANT CHANGE UP (ref 0–41)
BILIRUB SERPL-MCNC: 0.5 MG/DL — SIGNIFICANT CHANGE UP (ref 0.2–1.2)
BUN SERPL-MCNC: 11 MG/DL — SIGNIFICANT CHANGE UP (ref 10–20)
CALCIUM SERPL-MCNC: 9.1 MG/DL — SIGNIFICANT CHANGE UP (ref 8.5–10.1)
CHLORIDE SERPL-SCNC: 102 MMOL/L — SIGNIFICANT CHANGE UP (ref 98–110)
CO2 SERPL-SCNC: 23 MMOL/L — SIGNIFICANT CHANGE UP (ref 17–32)
CREAT SERPL-MCNC: 0.6 MG/DL — LOW (ref 0.7–1.5)
GLUCOSE SERPL-MCNC: 109 MG/DL — HIGH (ref 70–99)
LACTATE SERPL-SCNC: 1.1 MMOL/L — SIGNIFICANT CHANGE UP (ref 0.5–2.2)
LIDOCAIN IGE QN: 14 U/L — SIGNIFICANT CHANGE UP (ref 7–60)
POTASSIUM SERPL-MCNC: 3.8 MMOL/L — SIGNIFICANT CHANGE UP (ref 3.5–5)
POTASSIUM SERPL-SCNC: 3.8 MMOL/L — SIGNIFICANT CHANGE UP (ref 3.5–5)
PROT SERPL-MCNC: 7.4 G/DL — SIGNIFICANT CHANGE UP (ref 6–8)
SODIUM SERPL-SCNC: 139 MMOL/L — SIGNIFICANT CHANGE UP (ref 135–146)

## 2019-09-07 PROCEDURE — 76830 TRANSVAGINAL US NON-OB: CPT | Mod: 26

## 2019-09-07 PROCEDURE — 76856 US EXAM PELVIC COMPLETE: CPT | Mod: 26

## 2019-09-07 PROCEDURE — 74177 CT ABD & PELVIS W/CONTRAST: CPT | Mod: 26

## 2019-09-07 RX ORDER — ACETAMINOPHEN 500 MG
650 TABLET ORAL ONCE
Refills: 0 | Status: COMPLETED | OUTPATIENT
Start: 2019-09-07 | End: 2019-09-07

## 2019-09-07 RX ADMIN — Medication 650 MILLIGRAM(S): at 07:35

## 2019-09-07 RX ADMIN — MORPHINE SULFATE 4 MILLIGRAM(S): 50 CAPSULE, EXTENDED RELEASE ORAL at 00:15

## 2019-09-07 NOTE — ED ADULT NURSE REASSESSMENT NOTE - NS ED NURSE REASSESS COMMENT FT1
patient assessed, patient still complains of mild abdominal pain, no complaints of nausea or vomiting. Vital signs stable, will continue to reassess and monitor.

## 2020-01-29 NOTE — OB RN TRIAGE NOTE - PRO MENTAL HEALTH SX RECENT
[General Appearance - Well Developed] : well developed [Normal Appearance] : normal appearance [Well Groomed] : well groomed [General Appearance - Well Nourished] : well nourished [No Deformities] : no deformities [Normal Conjunctiva] : the conjunctiva exhibited no abnormalities [General Appearance - In No Acute Distress] : no acute distress [Normal Oropharynx] : normal oropharynx [Eyelids - No Xanthelasma] : the eyelids demonstrated no xanthelasmas [I] : I [Neck Appearance] : the appearance of the neck was normal [Jugular Venous Distention Increased] : there was no jugular-venous distention [Neck Cervical Mass (___cm)] : no neck mass was observed [Thyroid Nodule] : there were no palpable thyroid nodules [Thyroid Diffuse Enlargement] : the thyroid was not enlarged [Murmurs] : no murmurs present [Heart Sounds] : normal S1 and S2 [Heart Rate And Rhythm] : heart rate and rhythm were normal [Arterial Pulses Normal] : the arterial pulses were normal [Edema] : no peripheral edema present [Veins - Varicosity Changes] : no varicosital changes were noted in the lower extremities [Respiration, Rhythm And Depth] : normal respiratory rhythm and effort [Chest Palpation] : palpation of the chest revealed no abnormalities [Exaggerated Use Of Accessory Muscles For Inspiration] : no accessory muscle use [Auscultation Breath Sounds / Voice Sounds] : lungs were clear to auscultation bilaterally [Abdomen Soft] : soft [Lungs Percussion] : the lungs were normal to percussion [Bowel Sounds] : normal bowel sounds [Abdomen Tenderness] : non-tender [Abdomen Mass (___ Cm)] : no abdominal mass palpated [Skin Color & Pigmentation] : normal skin color and pigmentation [Abnormal Walk] : normal gait [Gait - Sufficient For Exercise Testing] : the gait was sufficient for exercise testing [] : no rash [Skin Lesions] : no skin lesions [No Venous Stasis] : no venous stasis none [No Xanthoma] : no  xanthoma was observed [No Skin Ulcers] : no skin ulcer [Sensation] : the sensory exam was normal to light touch and pinprick [Deep Tendon Reflexes (DTR)] : deep tendon reflexes were 2+ and symmetric [No Focal Deficits] : no focal deficits

## 2021-07-13 NOTE — OB PROVIDER H&P - NSPRIMARYCAREPROV_OBGYN_ALL_OB
From: Jami Perez  To: Christofer Najera  Sent: 7/13/2021 4:15 PM CDT  Subject: Other    Dr. Najera,  I started taking the Alendronate, 70 mg. tab. on Monday the 12th. of July. I will continue taking one every week on Mondays.   It has been a week since I returned home from the Rehab. ([facility]) and a month since my surgery. I came home with a urinary catheter and hope to get it removed on July 22nd. Just wanted to inform you on the starting of the medication that was prescribed. Thank You, Phuong Perez  
MD//BECCA/NIKUNJ

## 2022-01-08 ENCOUNTER — EMERGENCY (EMERGENCY)
Facility: HOSPITAL | Age: 42
LOS: 0 days | Discharge: HOME | End: 2022-01-08
Attending: STUDENT IN AN ORGANIZED HEALTH CARE EDUCATION/TRAINING PROGRAM | Admitting: STUDENT IN AN ORGANIZED HEALTH CARE EDUCATION/TRAINING PROGRAM
Payer: SELF-PAY

## 2022-01-08 VITALS
HEART RATE: 99 BPM | DIASTOLIC BLOOD PRESSURE: 88 MMHG | OXYGEN SATURATION: 100 % | WEIGHT: 279.99 LBS | TEMPERATURE: 98 F | HEIGHT: 62 IN | SYSTOLIC BLOOD PRESSURE: 189 MMHG | RESPIRATION RATE: 18 BRPM

## 2022-01-08 VITALS
OXYGEN SATURATION: 99 % | HEART RATE: 88 BPM | DIASTOLIC BLOOD PRESSURE: 93 MMHG | TEMPERATURE: 98 F | SYSTOLIC BLOOD PRESSURE: 186 MMHG

## 2022-01-08 DIAGNOSIS — R10.9 UNSPECIFIED ABDOMINAL PAIN: ICD-10-CM

## 2022-01-08 DIAGNOSIS — O20.0 THREATENED ABORTION: ICD-10-CM

## 2022-01-08 DIAGNOSIS — Z98.890 OTHER SPECIFIED POSTPROCEDURAL STATES: Chronic | ICD-10-CM

## 2022-01-08 DIAGNOSIS — O09.521 SUPERVISION OF ELDERLY MULTIGRAVIDA, FIRST TRIMESTER: ICD-10-CM

## 2022-01-08 DIAGNOSIS — O10.911 UNSPECIFIED PRE-EXISTING HYPERTENSION COMPLICATING PREGNANCY, FIRST TRIMESTER: ICD-10-CM

## 2022-01-08 DIAGNOSIS — O20.9 HEMORRHAGE IN EARLY PREGNANCY, UNSPECIFIED: ICD-10-CM

## 2022-01-08 DIAGNOSIS — O23.41 UNSPECIFIED INFECTION OF URINARY TRACT IN PREGNANCY, FIRST TRIMESTER: ICD-10-CM

## 2022-01-08 DIAGNOSIS — Z3A.11 11 WEEKS GESTATION OF PREGNANCY: ICD-10-CM

## 2022-01-08 DIAGNOSIS — O26.891 OTHER SPECIFIED PREGNANCY RELATED CONDITIONS, FIRST TRIMESTER: ICD-10-CM

## 2022-01-08 LAB
ALBUMIN SERPL ELPH-MCNC: 4.4 G/DL — SIGNIFICANT CHANGE UP (ref 3.5–5.2)
ALP SERPL-CCNC: 108 U/L — SIGNIFICANT CHANGE UP (ref 30–115)
ALT FLD-CCNC: 38 U/L — SIGNIFICANT CHANGE UP (ref 0–41)
ANION GAP SERPL CALC-SCNC: 18 MMOL/L — HIGH (ref 7–14)
APPEARANCE UR: ABNORMAL
AST SERPL-CCNC: 32 U/L — SIGNIFICANT CHANGE UP (ref 0–41)
BACTERIA # UR AUTO: ABNORMAL
BASOPHILS # BLD AUTO: 0.03 K/UL — SIGNIFICANT CHANGE UP (ref 0–0.2)
BASOPHILS NFR BLD AUTO: 0.3 % — SIGNIFICANT CHANGE UP (ref 0–1)
BILIRUB SERPL-MCNC: 0.3 MG/DL — SIGNIFICANT CHANGE UP (ref 0.2–1.2)
BILIRUB UR-MCNC: ABNORMAL
BLD GP AB SCN SERPL QL: SIGNIFICANT CHANGE UP
BUN SERPL-MCNC: 10 MG/DL — SIGNIFICANT CHANGE UP (ref 10–20)
CALCIUM SERPL-MCNC: 9.3 MG/DL — SIGNIFICANT CHANGE UP (ref 8.5–10.1)
CHLORIDE SERPL-SCNC: 98 MMOL/L — SIGNIFICANT CHANGE UP (ref 98–110)
CO2 SERPL-SCNC: 20 MMOL/L — SIGNIFICANT CHANGE UP (ref 17–32)
COLOR SPEC: ABNORMAL
CREAT SERPL-MCNC: 0.7 MG/DL — SIGNIFICANT CHANGE UP (ref 0.7–1.5)
DIFF PNL FLD: ABNORMAL
EOSINOPHIL # BLD AUTO: 0.22 K/UL — SIGNIFICANT CHANGE UP (ref 0–0.7)
EOSINOPHIL NFR BLD AUTO: 2.3 % — SIGNIFICANT CHANGE UP (ref 0–8)
EPI CELLS # UR: SIGNIFICANT CHANGE UP
GLUCOSE SERPL-MCNC: 251 MG/DL — HIGH (ref 70–99)
GLUCOSE UR QL: NEGATIVE — SIGNIFICANT CHANGE UP
HCG SERPL-ACNC: 1705 MIU/ML — HIGH
HCT VFR BLD CALC: 41.6 % — SIGNIFICANT CHANGE UP (ref 37–47)
HGB BLD-MCNC: 13.6 G/DL — SIGNIFICANT CHANGE UP (ref 12–16)
IMM GRANULOCYTES NFR BLD AUTO: 0.4 % — HIGH (ref 0.1–0.3)
KETONES UR-MCNC: ABNORMAL
LEUKOCYTE ESTERASE UR-ACNC: ABNORMAL
LYMPHOCYTES # BLD AUTO: 2.89 K/UL — SIGNIFICANT CHANGE UP (ref 1.2–3.4)
LYMPHOCYTES # BLD AUTO: 29.6 % — SIGNIFICANT CHANGE UP (ref 20.5–51.1)
MCHC RBC-ENTMCNC: 27.1 PG — SIGNIFICANT CHANGE UP (ref 27–31)
MCHC RBC-ENTMCNC: 32.7 G/DL — SIGNIFICANT CHANGE UP (ref 32–37)
MCV RBC AUTO: 83 FL — SIGNIFICANT CHANGE UP (ref 81–99)
MONOCYTES # BLD AUTO: 0.47 K/UL — SIGNIFICANT CHANGE UP (ref 0.1–0.6)
MONOCYTES NFR BLD AUTO: 4.8 % — SIGNIFICANT CHANGE UP (ref 1.7–9.3)
NEUTROPHILS # BLD AUTO: 6.1 K/UL — SIGNIFICANT CHANGE UP (ref 1.4–6.5)
NEUTROPHILS NFR BLD AUTO: 62.6 % — SIGNIFICANT CHANGE UP (ref 42.2–75.2)
NITRITE UR-MCNC: NEGATIVE — SIGNIFICANT CHANGE UP
NRBC # BLD: 0 /100 WBCS — SIGNIFICANT CHANGE UP (ref 0–0)
PH UR: 6 — SIGNIFICANT CHANGE UP (ref 5–8)
PLATELET # BLD AUTO: 267 K/UL — SIGNIFICANT CHANGE UP (ref 130–400)
POTASSIUM SERPL-MCNC: 3.5 MMOL/L — SIGNIFICANT CHANGE UP (ref 3.5–5)
POTASSIUM SERPL-SCNC: 3.5 MMOL/L — SIGNIFICANT CHANGE UP (ref 3.5–5)
PROT SERPL-MCNC: 7.6 G/DL — SIGNIFICANT CHANGE UP (ref 6–8)
PROT UR-MCNC: ABNORMAL
RBC # BLD: 5.01 M/UL — SIGNIFICANT CHANGE UP (ref 4.2–5.4)
RBC # FLD: 14.6 % — HIGH (ref 11.5–14.5)
RBC CASTS # UR COMP ASSIST: SIGNIFICANT CHANGE UP /HPF (ref 0–4)
SODIUM SERPL-SCNC: 136 MMOL/L — SIGNIFICANT CHANGE UP (ref 135–146)
SP GR SPEC: 1.02 — SIGNIFICANT CHANGE UP (ref 1.01–1.03)
UROBILINOGEN FLD QL: SIGNIFICANT CHANGE UP
WBC # BLD: 9.75 K/UL — SIGNIFICANT CHANGE UP (ref 4.8–10.8)
WBC # FLD AUTO: 9.75 K/UL — SIGNIFICANT CHANGE UP (ref 4.8–10.8)
WBC UR QL: SIGNIFICANT CHANGE UP /HPF (ref 0–5)

## 2022-01-08 PROCEDURE — 99285 EMERGENCY DEPT VISIT HI MDM: CPT

## 2022-01-08 PROCEDURE — 76830 TRANSVAGINAL US NON-OB: CPT | Mod: 26

## 2022-01-08 PROCEDURE — 99213 OFFICE O/P EST LOW 20 MIN: CPT

## 2022-01-08 RX ORDER — CEPHALEXIN 500 MG
1 CAPSULE ORAL
Qty: 14 | Refills: 0
Start: 2022-01-08 | End: 2022-01-14

## 2022-01-08 RX ORDER — ACETAMINOPHEN 500 MG
650 TABLET ORAL ONCE
Refills: 0 | Status: COMPLETED | OUTPATIENT
Start: 2022-01-08 | End: 2022-01-08

## 2022-01-08 RX ORDER — NITROFURANTOIN MACROCRYSTAL 50 MG
1 CAPSULE ORAL
Qty: 14 | Refills: 0
Start: 2022-01-08 | End: 2022-01-14

## 2022-01-08 RX ADMIN — Medication 650 MILLIGRAM(S): at 17:24

## 2022-01-08 NOTE — ED PROVIDER NOTE - NSFOLLOWUPINSTRUCTIONS_ED_ALL_ED_FT
Please follow up with the OB/GYN Clinic in 48 hours for repeat lab work   Please follow up with [facility] or your primary care doctor for evaluation of your elevated blood pressure.  Please be aware of any new or worsening signs or symptoms that should prompt your return to the ER.      Vaginal Bleeding in Pregnancy: Threatened     A threatened miscarriage occurs when you have vaginal bleeding during your first 20 weeks of pregnancy but the pregnancy has not ended. If you have vaginal bleeding during this time, your health care provider will do tests to make sure you are still pregnant. If the tests show you are still pregnant and the developing baby (fetus) inside your womb (uterus) is still growing, your condition is considered a threatened miscarriage. A threatened miscarriage does not mean your pregnancy will end, but it does increase the risk of losing your pregnancy.    SEEK IMMEDIATE MEDICAL CARE IF YOU HAVE THE FOLLOWING SYMPTOMS: heavy vaginal bleeding, severe low back or abdominal cramps, fever/chills, or lightheadedness/dizziness.    Urinary Tract Infection    A urinary tract infection (UTI) is an infection of any part of the urinary tract, which includes the kidneys, ureters, bladder, and urethra. Risk factors include ignoring your need to urinate, wiping back to front if female, being an uncircumcised male, and having diabetes or a weak immune system. Symptoms include frequent urination, pain or burning with urination, foul smelling urine, cloudy urine, pain in the lower abdomen, blood in the urine, and fever. If you were prescribed an antibiotic medicine, take it as told by your health care provider. Do not stop taking the antibiotic even if you start to feel better.    SEEK IMMEDIATE MEDICAL CARE IF YOU HAVE THE FOLLOWING SYMPTOMS: severe back or abdominal pain, inability to keep fluids or medicine down, dizziness/lightheadedness, or a change in mental status.        Hypertension  Hypertension, commonly called high blood pressure, is when the force of blood pumping through the arteries is too strong. The arteries are the blood vessels that carry blood from the heart throughout the body. Hypertension forces the heart to work harder to pump blood and may cause arteries to become narrow or stiff. Having untreated or uncontrolled hypertension can cause heart attacks, strokes, kidney disease, and other problems.    A blood pressure reading consists of a higher number over a lower number. Ideally, your blood pressure should be below 120/80. The first ("top") number is called the systolic pressure. It is a measure of the pressure in your arteries as your heart beats. The second ("bottom") number is called the diastolic pressure. It is a measure of the pressure in your arteries as the heart relaxes.    What are the causes?  The cause of this condition is not known.    What increases the risk?  Some risk factors for high blood pressure are under your control. Others are not.    Factors you can change     Smoking.  Having type 2 diabetes mellitus, high cholesterol, or both.  Not getting enough exercise or physical activity.  Being overweight.  Having too much fat, sugar, calories, or salt (sodium) in your diet.  Drinking too much alcohol.  Factors that are difficult or impossible to change     Having chronic kidney disease.  Having a family history of high blood pressure.  Age. Risk increases with age.  Race. You may be at higher risk if you are -American.  Gender. Men are at higher risk than women before age 45. After age 65, women are at higher risk than men.  Having obstructive sleep apnea.  Stress.  What are the signs or symptoms?  Extremely high blood pressure (hypertensive crisis) may cause:    Headache.  Anxiety.  Shortness of breath.  Nosebleed.  Nausea and vomiting.  Severe chest pain.  Jerky movements you cannot control (seizures).    How is this diagnosed?  This condition is diagnosed by measuring your blood pressure while you are seated, with your arm resting on a surface. The cuff of the blood pressure monitor will be placed directly against the skin of your upper arm at the level of your heart. It should be measured at least twice using the same arm. Certain conditions can cause a difference in blood pressure between your right and left arms.    Certain factors can cause blood pressure readings to be lower or higher than normal (elevated) for a short period of time:    When your blood pressure is higher when you are in a health care provider's office than when you are at home, this is called white coat hypertension. Most people with this condition do not need medicines.  When your blood pressure is higher at home than when you are in a health care provider's office, this is called masked hypertension. Most people with this condition may need medicines to control blood pressure.    If you have a high blood pressure reading during one visit or you have normal blood pressure with other risk factors:    You may be asked to return on a different day to have your blood pressure checked again.  You may be asked to monitor your blood pressure at home for 1 week or longer.    If you are diagnosed with hypertension, you may have other blood or imaging tests to help your health care provider understand your overall risk for other conditions.    How is this treated?  This condition is treated by making healthy lifestyle changes, such as eating healthy foods, exercising more, and reducing your alcohol intake. Your health care provider may prescribe medicine if lifestyle changes are not enough to get your blood pressure under control, and if:    Your systolic blood pressure is above 130.  Your diastolic blood pressure is above 80.    Your personal target blood pressure may vary depending on your medical conditions, your age, and other factors.    Follow these instructions at home:  Eating and drinking     Eat a diet that is high in fiber and potassium, and low in sodium, added sugar, and fat. An example eating plan is called the DASH (Dietary Approaches to Stop Hypertension) diet. To eat this way:    Eat plenty of fresh fruits and vegetables. Try to fill half of your plate at each meal with fruits and vegetables.  Eat whole grains, such as whole wheat pasta, brown rice, or whole grain bread. Fill about one quarter of your plate with whole grains.  Eat or drink low-fat dairy products, such as skim milk or low-fat yogurt.  Avoid fatty cuts of meat, processed or cured meats, and poultry with skin. Fill about one quarter of your plate with lean proteins, such as fish, chicken without skin, beans, eggs, and tofu.  Avoid premade and processed foods. These tend to be higher in sodium, added sugar, and fat.    Reduce your daily sodium intake. Most people with hypertension should eat less than 1,500 mg of sodium a day.  ImageLimit alcohol intake to no more than 1 drink a day for nonpregnant women and 2 drinks a day for men. One drink equals 12 oz of beer, 5 oz of wine, or 1½ oz of hard liquor.  Lifestyle     Work with your health care provider to maintain a healthy body weight or to lose weight. Ask what an ideal weight is for you.  Get at least 30 minutes of exercise that causes your heart to beat faster (aerobic exercise) most days of the week. Activities may include walking, swimming, or biking.  Include exercise to strengthen your muscles (resistance exercise), such as pilates or lifting weights, as part of your weekly exercise routine. Try to do these types of exercises for 30 minutes at least 3 days a week.  Do not use any products that contain nicotine or tobacco, such as cigarettes and e-cigarettes. If you need help quitting, ask your health care provider.  Monitor your blood pressure at home as told by your health care provider.  Keep all follow-up visits as told by your health care provider. This is important.  Medicines     Take over-the-counter and prescription medicines only as told by your health care provider. Follow directions carefully. Blood pressure medicines must be taken as prescribed.  Do not skip doses of blood pressure medicine. Doing this puts you at risk for problems and can make the medicine less effective.  Ask your health care provider about side effects or reactions to medicines that you should watch for.  Contact a health care provider if:  You think you are having a reaction to a medicine you are taking.  You have headaches that keep coming back (recurring).  You feel dizzy.  You have swelling in your ankles.  You have trouble with your vision.  Get help right away if:  You develop a severe headache or confusion.  You have unusual weakness or numbness.  You feel faint.  You have severe pain in your chest or abdomen.  You vomit repeatedly.  You have trouble breathing.  Summary  Hypertension is when the force of blood pumping through your arteries is too strong. If this condition is not controlled, it may put you at risk for serious complications.  Your personal target blood pressure may vary depending on your medical conditions, your age, and other factors. For most people, a normal blood pressure is less than 120/80.  Hypertension is treated with lifestyle changes, medicines, or a combination of both. Lifestyle changes include weight loss, eating a healthy, low-sodium diet, exercising more, and limiting alcohol.  This information is not intended to replace advice given to you by your health care provider. Make sure you discuss any questions you have with your health care provider.

## 2022-01-08 NOTE — ED PROVIDER NOTE - ATTENDING CONTRIBUTION TO CARE
40 yo f , 11wks pregnant, htn  pt presents for eval of lower abd pain and vaginal bleeding. LMP 10/20. pt w/ 4 days of vaginal bleeding. pt presented for worsening pain since this morning.  no fever/chills/sp/congestion/urinary sx.  p has not had confirmatory sono     vss  gen- NAD, aaox3  card-rrr  lungs-ctab, no wheezing or rhonchi  abd-sntnd, no guarding or rebound  GYN- per PA Landin- mild bleeding w/ clots, no POC,  no discharge  neuro- full str/sensation, cn ii-xii grossly intact, normal coordination     r/o ectopic vs miscarriage vs threatened miscarriage  labs, hcg, T&S, pelvic sono

## 2022-01-08 NOTE — ED PROVIDER NOTE - PROGRESS NOTE DETAILS
ck: obgyn aware. results discussed with patient and daughter at bedside. ck: patient evaluated by ob/gyn, patient to fu in 48 hours for repeat beta hcg. patient also educated on elevated BP - denies headache, chest pain or other symptoms. reports hx gestational hypertension - patient does not take anti-htn daily. patient educated on diet control and educated on importance of fu with primary care doctor as well as ob/gyn - patient and family at bedside verbalize understanding of plan.

## 2022-01-08 NOTE — ED PROVIDER NOTE - NS ED ROS FT
Review of Systems:  	•	CONSTITUTIONAL: no fever  	•	SKIN: no rash  	•	RESPIRATORY: no shortness of breath, no cough  	•	CARDIAC: no chest pain, no palpitations  	•	GI: +abd pain, no vomiting, no diarrhea, no constipation  	•	GENITO-URINARY: +vaginal bleeding, no discharge, no urinary symptoms   	•	MUSCULOSKELETAL: no joint paint, no swelling, no redness  	•	NEUROLOGIC: no weakness, no headache   	•	PSYCH: no anxiety, non suicidal, non homicidal, no hallucination, no depression

## 2022-01-08 NOTE — ED PROVIDER NOTE - NSFOLLOWUPCLINICS_GEN_ALL_ED_FT
[facility]  OB/GYN  [address] [place] [address]  Phone: [phone]  Fax:   Scheduled Appointment: 1/10/2022     [facility]  Medicine  [address], [place]   Phone: [phone]  Fax:   Follow Up Time: 1-3 Days

## 2022-01-08 NOTE — ED PROVIDER NOTE - CARE PLAN
1 Principal Discharge DX:	Vaginal bleeding in pregnancy  Secondary Diagnosis:	Threatened   Secondary Diagnosis:	Urinary tract infection

## 2022-01-08 NOTE — ED ADULT NURSE NOTE - OBJECTIVE STATEMENT
Pt 11 weeks pregnant C/O vaginal bleeding for 5 days . Pt report bleeding becoming progressively increased ,denies no fever or chills no N/V .

## 2022-01-08 NOTE — ED ADULT TRIAGE NOTE - CHIEF COMPLAINT QUOTE
Patient presents to ED c/o vaginal bleeding x 4 days that ha worsened over the past 2 days. Patient also reports mild left lower quadrant abdominal pain

## 2022-01-08 NOTE — ED PROVIDER NOTE - CLINICAL SUMMARY MEDICAL DECISION MAKING FREE TEXT BOX
ED w/u w. c/f pregnancy of unknown location, gyn c/s placed and close f/u for repeat u/s and hcg provided for pt. BP elevated but similarly high in the past. pt otherwise asymptomatic. too early for pre-eclampsia.  discussed lifestyle changes for BP management and need for BP check w/ pcp. return precautions for ectopic and htn discussed

## 2022-01-08 NOTE — ED ADULT NURSE NOTE - NSIMPLEMENTINTERV_GEN_ALL_ED
Implemented All Universal Safety Interventions:  [place] to call system. Call bell, personal items and telephone within reach. Instruct patient to call for assistance. Room bathroom lighting operational. Non-slip footwear when patient is off stretcher. Physically safe environment: no spills, clutter or unnecessary equipment. Stretcher in lowest position, wheels locked, appropriate side rails in place.

## 2022-01-08 NOTE — CONSULT NOTE ADULT - SUBJECTIVE AND OBJECTIVE BOX
PGY2 Note  Chief Complaint: vaginal bleeding    HPI: 42yo  @11w3d by LMP 10/20/21 presents to ED with vaginal bleeding since Tuesday. Patient states that bleeding began as light spotting and only being seen with wiping. In last 1-2 days bleeding has started to increase in amount with passage of 1 clot and requiring the use of 1 pad. Denies fever, chills, nausea, vomiting, abdominal pain, dysuria. Denies dizziness, lightheadedness, chest pain, SOB, palpitations. Patient has not seen GYN since 2019. Receives [facility] at [facility]. This is an unplanned but desired pregnancy. Pregnancy complicated by cHTN, patient unsure of usual BP ranges, not currently on medication.     Ob/Gyn History:                   LMP - 10/20/21                  Cycle Length - regular  Denies history of ovarian cysts, uterine fibroids, abnormal paps, or STIs  Last Pap Smear -     OBhx:  FT LTCS x3, vertical incision, cHTN with superimposed preeclampsia with severe features, discharged home on labetalol 300 BID    Denies the following: constitutional symptoms, visual symptoms, cardiovascular symptoms, respiratory symptoms, GI symptoms, musculoskeletal symptoms, skin symptoms, neurologic symptoms, hematologic symptoms, allergic symptoms, psychiatric symptoms  Except any pertinent positives listed.     PAST MEDICAL & SURGICAL HISTORY:  Hypertension  Obesity    PSHx:    delivery delivered  H/O left inguinal hernia repair    FAMILY HISTORY:  Family history of hypertension (Father)    SOCIAL HISTORY: Denies cigarette use, alcohol use, or illicit drug use    Home Medications: None    Allergies: No Known Allergies    Vital Signs Last 24 Hrs  T(F): 98.2 (2022 14:04), Max: 98.2 (2022 14:04)  HR: 88 (2022 14:04) (88 - 99)  BP: 186/93 (2022 14:04) (186/93 - 189/88)  RR: 18 (2022 13:17) (18 - 18)  Height (cm): 157.5 (22 @ 13:17)  Weight (kg): 127 (22 @ 13:17)  BMI (kg/m2): 51.2 (22 @ 13:17)  BSA (m2): 2.21 (22 @ 13:17)    General Appearance - AAOx3, NAD  Heart - S1S2 regular rate and rhythm  Lung - CTA Bilaterally  Abdomen - Soft, nontender, nondistended, no rebound, no rigidity, no guarding, bowel sounds present    GYN/Pelvis:    Labia Majora - Normal  Labia Minora - Normal  Clitoris - Normal  Urethra - Normal  Vagina - 15cc bright red blood, 1 quarter size clot evacuated  Cervix - No active bleeding, closed, no CMT    Uterus:  Size - difficult to ascertain secondary to patient body habitus   Tenderness - None  Mass - None  Freely mobile    Adnexa:  Masses - None  Tenderness - None    Meds:   acetaminophen     Tablet .. 650 milliGRAM(s) Oral once    LABS:                        13.6   9.75  )-----------( 267      ( 2022 15:15 )             41.6     HCG Quantitative, Serum: 1705.0 mIU/mL (22 @ 15:15)    ABO RH Interpretation: O POS (22 @ 15:15)  Antibody Screen: NEG (22 @ 15:15)        136  |  98  |  10  ----------------------------<  251<H>  3.5   |  20  |  0.7    Ca    9.3      2022 15:15    TPro  7.6  /  Alb  4.4  /  TBili  0.3  /  DBili  x   /  AST  32  /  ALT  38  /  AlkPhos  108        Urinalysis Basic - ( 2022 15:13 )    Color: Amanda / Appearance: Turbid / S.025 / pH: x  Gluc: x / Ketone: Small  / Bili: Small / Urobili: <2 mg/dL   Blood: x / Protein: 100 mg/dL / Nitrite: Negative   Leuk Esterase: Small / RBC: 25-50 /HPF / WBC 5-10 /HPF   Sq Epi: x / Non Sq Epi: Few / Bacteria: Moderate    RADIOLOGY & ADDITIONAL STUDIES:  < from: US Transvaginal (22 @ 17:24) >  ACC: [id number] EXAM:  US TRANSVAGINAL                          PROCEDURE DATE:  2022      INTERPRETATION:  CLINICAL INFORMATION: Vaginal bleeding. Pregnancy.    LMP: 10/20/2021    COMPARISON: Pelvic ultrasound and CT 2019.    TECHNIQUE:  Endovaginal and transabdominal pelvic sonogram. Color and Spectral   Doppler was performed.    FINDINGS:    Uterus: 13.5 cm x 7.2 cm x 10.7 cm. heterogeneous uterine parenchyma.  Intrauterine possible early gestational sac with mean sac diameter of 1.2   cm, corresponding to estimated gestational age of 5 weeks and 2 days; no   yolk sac or embryo identified at this time.  Endometrium: 27 mm.  Bilateral ovarian Doppler flow demonstrated.  Right ovary: 3.0 cm x 2.8 cm x 3.1 cm.  Left ovary: 3.0 cm x 2.5 cm x 2.7 cm.  Left paraovarian 5.2 cm cyst , unchanged since 2019.  Fluid: None.    IMPRESSION:    Intrauterine possible early gestational sac with mean sac diameter of 1.2   cm, corresponding to estimated gestational ageof 6 weeks and 2 days; no   yolk sac or embryo identified at this time. Follow-up pelvic ultrasound   and beta-hCG recommended.    Thickened endometrium, up to 2.7 cm, which may be related to pregnancy   and/or intraluminal blood products.    Left paraovarian 5.2 cm cyst , unchanged since 2019.    --- End of Report ---      < end of copied text >

## 2022-01-08 NOTE — ED PROVIDER NOTE - CARE PROVIDERS DIRECT ADDRESSES
[email or web address]

## 2022-01-08 NOTE — ED PROVIDER NOTE - CARE PROVIDER_API CALL
Hermelinda Isaac)  Family Medicine  [address] [place] [address]  Phone: [phone]  Fax: [phone]  Follow Up Time: 1-3 Days

## 2022-01-08 NOTE — ED PROVIDER NOTE - PATIENT PORTAL LINK FT
You can access the FollowMyHealth Patient Portal offered by [facility] by registering at the following website: [email or web address]. By joining [employer]’s FollowMyHealth portal, you will also be able to view your health information using other applications (apps) compatible with our system.

## 2022-01-08 NOTE — CONSULT NOTE ADULT - ATTENDING COMMENTS
42 y/o P3 with likely early abnormal IUP, cannot r/o ectopic, with uncontrolled HTN. Will trend beta hcg and follow up sonogram. Management of BPs per ED. Follow up outpatient

## 2022-01-08 NOTE — ED PROVIDER NOTE - PHYSICAL EXAMINATION
CONSTITUTIONAL: Well-developed; well-nourished; in no acute distress, nontoxic appearing  SKIN: skin exam is warm and dry  HEAD: Normocephalic; atraumatic  ENT: MMM  CARD: S1, S2 normal, no murmur  RESP: Good air movement bilaterally  ABD: soft; non-distended; non-tender.   :  Speculum: vaginal bleeding with clots in vaginal vault. Bimanual: OS fingertip, no CMT, no adnexal TTP. Chaperone: JOSE MANUEL Faustin  EXT: Normal ROM.   NEURO: awake, alert, following commands, oriented, grossly unremarkable. No Focal deficits. GCS 15.   PSYCH: Cooperative, appropriate.

## 2022-01-08 NOTE — ED PROVIDER NOTE - OBJECTIVE STATEMENT
41 year old female, , currently ~11 weeks pregnant, HTN, who presents with vaginal bleeding and abd pain. Patient LMP 10/, with gradual onset of vaginal bleeding x4 days ago with clots, with gradual onset of RLQ pain that began this morning. reports nausea, no vomiting. denies fever, chills, sore throat, back pain, urinary symptoms, bowel changes. patient has scheduled appt with obgyn, has not had an official TVUS.

## 2022-01-08 NOTE — CONSULT NOTE ADULT - ASSESSMENT
42yo  with pregnancy of unknown location, likely abnormal IUP, cannot r/o ectopic, with likely poorly controlled cHTN, currently clinically and hemodynamically stable.    -repeat bhcg on 1/10, scripts sent through [facility]  -bleeding precautions and ectopic precautions given  -recommend optimization of elevated BPs  -Dispo per ED    Dr. Doty and Dr. Lozada aware

## 2022-01-08 NOTE — ED PROVIDER NOTE - NSICDXFAMILYHX_GEN_ALL_CORE_FT
FAMILY HISTORY:  Father  Still living? No  Family history of hypertension, Age at diagnosis: Age Unknown

## 2022-01-10 NOTE — CHART NOTE - NSCHARTNOTEFT_GEN_A_CORE
Attempted to contact patient via phone today. No answer, left VM as a courtesy reminder. Will follow-up results.    Dr. Marinelli to be aware

## 2022-01-11 LAB — HCG SERPL-MCNC: 170.1 MIU/ML

## 2022-01-11 RX ORDER — CEPHALEXIN 500 MG/1
500 CAPSULE ORAL TWICE DAILY
Qty: 10 | Refills: 0 | Status: ACTIVE | COMMUNITY
Start: 2022-01-11 | End: 1900-01-01

## 2022-01-12 ENCOUNTER — LABORATORY RESULT (OUTPATIENT)
Age: 42
End: 2022-01-12

## 2022-01-14 RX ORDER — CEPHALEXIN 500 MG/1
500 CAPSULE ORAL
Qty: 10 | Refills: 0 | Status: ACTIVE | COMMUNITY
Start: 2022-01-14 | End: 1900-01-01

## 2022-01-19 ENCOUNTER — APPOINTMENT (OUTPATIENT)
Dept: OBGYN | Facility: CLINIC | Age: 42
End: 2022-01-19

## 2022-01-19 LAB — HCG SERPL-MCNC: 1.1 MIU/ML

## 2022-01-19 NOTE — CHART NOTE - NSCHARTNOTEFT_GEN_A_CORE
PGY1 Note    Ms. Wharton is a 40yo F who presented to the ED on 22.     HPI: 40yo  @11w3d by LMP 10/20/21 presented to the ED with vaginal bleeding since 22.  Patient stated that the bleeding began as light spotting. Over the next 1-2 days bleeding increased in amount with passage of 1 clot and required the use of 1 pad. Denied fever, chills, nausea, vomiting, abdominal pain, dysuria. Denied dizziness, lightheadedness, chest pain, SOB, palpitations. Patient has not seen GYN since 2019. It was an unplanned but desired pregnancy. PMHx of cHTN, patient unsure of usual BP ranges, not currently on medication.    Lab results:    9.75>13.6/41.6<267, 136/3.5/98/20/10/0.7<251, AST/ALT: 32/38, bhc, UA: small blood, O pos, ab screen neg; UCx positive for E.Coli (Keflex sent to patient's pharmacy)  1/10 bhcg 170.1     bhcg 44.8   Pos Upreg   bhcg 1.1      Imagin/8 TVUS: Intrauterine possible early gestational sac with mean sac diameter of 1.2 cm, corresponding to estimated gestational age of 5 weeks and 2 days; no yolk sac or embryo identified at this time. Follow-up pelvic ultrasound and beta-hCG recommended. Thickened endometrium, up to 2.7 cm, which may be related to pregnancy and/or intraluminal blood products. Left paraovarian 5.2 cm cyst , unchanged since 2019.     Summary:  Patient's serial beta-hcg was followed and continued to decrease appropriately by lab standards. Patient's symptoms at this time have resolved as well. Diagnosis likely of complete . Patient informed of results and diagnosis. GYN will no longer continue to follow.     Dr Marinelli and Dr Wyatt chang.

## 2022-02-03 LAB
APPEARANCE: ABNORMAL
BACTERIA UR CULT: ABNORMAL
BILIRUBIN URINE: NEGATIVE
BLOOD URINE: ABNORMAL
COLOR: ABNORMAL
GLUCOSE QUALITATIVE U: NORMAL
HCG SERPL-MCNC: 44.8 MIU/ML
KETONES URINE: NEGATIVE
LEUKOCYTE ESTERASE URINE: NEGATIVE
NITRITE URINE: NEGATIVE
PH URINE: 7
PROTEIN URINE: ABNORMAL
SPECIFIC GRAVITY URINE: 1.02
UROBILINOGEN URINE: NORMAL

## 2022-05-24 NOTE — PROGRESS NOTE ADULT - ASSESSMENT
"Anesthesia Transfer of Care Note    Patient: Karin Urrutia    Procedure(s) Performed: Procedure(s) (LRB):  CREATION, BYPASS, ARTERIAL, FEMORAL TO POPLITEAL, USING GRAFT (Right)  AMPUTATION, TOE (Right)    Patient location: ICU    Anesthesia Type: general    Transport from OR: Upon arrival to PACU/ICU, patient attached to ventilator and auscultated to confirm bilateral breath sounds and adequate TV. Continuous ECG monitoring in transport. Continuous SpO2 monitoring in transport. Transported from OR intubated on 100% O2 by AMBU with assisted ventilation    Post pain: adequate analgesia    Post assessment: no apparent anesthetic complications    Post vital signs: stable    Level of consciousness: responds to stimulation    Complications: none    Transfer of care protocol was followed      Last vitals:   Visit Vitals  BP (!) 116/53 (Patient Position: Lying)   Pulse 71   Temp 36.7 °C (98 °F) (Oral)   Resp 19   Ht 5' 2" (1.575 m)   Wt 104.1 kg (229 lb 8 oz)   LMP  (LMP Unknown)   SpO2 100%   Breastfeeding No   BMI 41.98 kg/m²     " 39 y/o P3 s/p repeat  #3 with vertical skin incision, pod1, CHTN now with superimposed preeclampsia, s/p IV pushes of labetalol and hydralazine, now with blood pressures in the non-severe range, on magnesium for seizure prophylaxis, doing well.     - Continue with magnesium  - f/u BPs  - labetalol 100 mg BID  - strict I/Os  - neuro checks q2h  - pain management PRN  - routine postop care  - incentive spirometer encouraged  - lovenox for dvt ppx    Dr. Costello and Dr. Pavon aware.

## 2022-07-07 NOTE — ED ADULT NURSE NOTE - ISOLATION TYPE:
None

## 2023-03-06 NOTE — PACU DISCHARGE NOTE - NSCLINEINSERTRD_GEN_ALL_CORE
How Severe Are Your Spot(S)?: mild What Type Of Note Output Would You Prefer (Optional)?: Standard Output What Is The Reason For Today's Visit?: Full Body Skin Examination What Is The Reason For Today's Visit? (Being Monitored For X): the re-examination of lesions previously examined Additional History: Patient has worrisome lesions on his chest No

## 2023-04-08 ENCOUNTER — INPATIENT (INPATIENT)
Facility: HOSPITAL | Age: 43
LOS: 2 days | Discharge: HOME | DRG: 193 | End: 2023-04-11
Attending: INTERNAL MEDICINE | Admitting: INTERNAL MEDICINE
Payer: COMMERCIAL

## 2023-04-08 VITALS
SYSTOLIC BLOOD PRESSURE: 110 MMHG | HEART RATE: 84 BPM | DIASTOLIC BLOOD PRESSURE: 82 MMHG | RESPIRATION RATE: 22 BRPM | TEMPERATURE: 98 F | OXYGEN SATURATION: 100 %

## 2023-04-08 DIAGNOSIS — J18.9 PNEUMONIA, UNSPECIFIED ORGANISM: ICD-10-CM

## 2023-04-08 DIAGNOSIS — Z98.890 OTHER SPECIFIED POSTPROCEDURAL STATES: Chronic | ICD-10-CM

## 2023-04-08 LAB
ALBUMIN SERPL ELPH-MCNC: 4 G/DL — SIGNIFICANT CHANGE UP (ref 3.5–5.2)
ALP SERPL-CCNC: 127 U/L — HIGH (ref 30–115)
ALT FLD-CCNC: 28 U/L — SIGNIFICANT CHANGE UP (ref 0–41)
ANION GAP SERPL CALC-SCNC: 11 MMOL/L — SIGNIFICANT CHANGE UP (ref 7–14)
AST SERPL-CCNC: 20 U/L — SIGNIFICANT CHANGE UP (ref 0–41)
BASE EXCESS BLDV CALC-SCNC: 3 MMOL/L — SIGNIFICANT CHANGE UP (ref -2–3)
BASOPHILS # BLD AUTO: 0.04 K/UL — SIGNIFICANT CHANGE UP (ref 0–0.2)
BASOPHILS NFR BLD AUTO: 0.4 % — SIGNIFICANT CHANGE UP (ref 0–1)
BILIRUB SERPL-MCNC: 0.3 MG/DL — SIGNIFICANT CHANGE UP (ref 0.2–1.2)
BUN SERPL-MCNC: 13 MG/DL — SIGNIFICANT CHANGE UP (ref 10–20)
CA-I SERPL-SCNC: 1.19 MMOL/L — SIGNIFICANT CHANGE UP (ref 1.15–1.33)
CALCIUM SERPL-MCNC: 8.9 MG/DL — SIGNIFICANT CHANGE UP (ref 8.4–10.4)
CHLORIDE SERPL-SCNC: 104 MMOL/L — SIGNIFICANT CHANGE UP (ref 98–110)
CO2 SERPL-SCNC: 24 MMOL/L — SIGNIFICANT CHANGE UP (ref 17–32)
CREAT SERPL-MCNC: 0.7 MG/DL — SIGNIFICANT CHANGE UP (ref 0.7–1.5)
D DIMER BLD IA.RAPID-MCNC: 244 NG/ML DDU — HIGH
EGFR: 110 ML/MIN/1.73M2 — SIGNIFICANT CHANGE UP
EOSINOPHIL # BLD AUTO: 0.21 K/UL — SIGNIFICANT CHANGE UP (ref 0–0.7)
EOSINOPHIL NFR BLD AUTO: 2.1 % — SIGNIFICANT CHANGE UP (ref 0–8)
GAS PNL BLDV: 135 MMOL/L — LOW (ref 136–145)
GAS PNL BLDV: SIGNIFICANT CHANGE UP
GAS PNL BLDV: SIGNIFICANT CHANGE UP
GLUCOSE SERPL-MCNC: 262 MG/DL — HIGH (ref 70–99)
HCG SERPL QL: NEGATIVE — SIGNIFICANT CHANGE UP
HCO3 BLDV-SCNC: 27 MMOL/L — SIGNIFICANT CHANGE UP (ref 22–29)
HCT VFR BLD CALC: 39.3 % — SIGNIFICANT CHANGE UP (ref 37–47)
HCT VFR BLDA CALC: 39 % — SIGNIFICANT CHANGE UP (ref 39–51)
HGB BLD CALC-MCNC: 13.1 G/DL — SIGNIFICANT CHANGE UP (ref 12.6–17.4)
HGB BLD-MCNC: 12.7 G/DL — SIGNIFICANT CHANGE UP (ref 12–16)
IMM GRANULOCYTES NFR BLD AUTO: 0.2 % — SIGNIFICANT CHANGE UP (ref 0.1–0.3)
LACTATE BLDV-MCNC: 1.8 MMOL/L — SIGNIFICANT CHANGE UP (ref 0.5–2)
LYMPHOCYTES # BLD AUTO: 2.41 K/UL — SIGNIFICANT CHANGE UP (ref 1.2–3.4)
LYMPHOCYTES # BLD AUTO: 23.6 % — SIGNIFICANT CHANGE UP (ref 20.5–51.1)
MCHC RBC-ENTMCNC: 26.3 PG — LOW (ref 27–31)
MCHC RBC-ENTMCNC: 32.3 G/DL — SIGNIFICANT CHANGE UP (ref 32–37)
MCV RBC AUTO: 81.5 FL — SIGNIFICANT CHANGE UP (ref 81–99)
MONOCYTES # BLD AUTO: 0.65 K/UL — HIGH (ref 0.1–0.6)
MONOCYTES NFR BLD AUTO: 6.4 % — SIGNIFICANT CHANGE UP (ref 1.7–9.3)
MRSA PCR RESULT.: NEGATIVE — SIGNIFICANT CHANGE UP
NEUTROPHILS # BLD AUTO: 6.88 K/UL — HIGH (ref 1.4–6.5)
NEUTROPHILS NFR BLD AUTO: 67.3 % — SIGNIFICANT CHANGE UP (ref 42.2–75.2)
NRBC # BLD: 0 /100 WBCS — SIGNIFICANT CHANGE UP (ref 0–0)
NT-PROBNP SERPL-SCNC: 283 PG/ML — SIGNIFICANT CHANGE UP (ref 0–300)
PCO2 BLDV: 39 MMHG — SIGNIFICANT CHANGE UP (ref 39–42)
PH BLDV: 7.45 — HIGH (ref 7.32–7.43)
PLATELET # BLD AUTO: 237 K/UL — SIGNIFICANT CHANGE UP (ref 130–400)
PO2 BLDV: 45 MMHG — SIGNIFICANT CHANGE UP
POTASSIUM BLDV-SCNC: 3.2 MMOL/L — LOW (ref 3.5–5.1)
POTASSIUM SERPL-MCNC: 3.5 MMOL/L — SIGNIFICANT CHANGE UP (ref 3.5–5)
POTASSIUM SERPL-SCNC: 3.5 MMOL/L — SIGNIFICANT CHANGE UP (ref 3.5–5)
PROT SERPL-MCNC: 7 G/DL — SIGNIFICANT CHANGE UP (ref 6–8)
RBC # BLD: 4.82 M/UL — SIGNIFICANT CHANGE UP (ref 4.2–5.4)
RBC # FLD: 14.7 % — HIGH (ref 11.5–14.5)
SAO2 % BLDV: 75.9 % — SIGNIFICANT CHANGE UP
SARS-COV-2 RNA SPEC QL NAA+PROBE: SIGNIFICANT CHANGE UP
SODIUM SERPL-SCNC: 139 MMOL/L — SIGNIFICANT CHANGE UP (ref 135–146)
TROPONIN T SERPL-MCNC: <0.01 NG/ML — SIGNIFICANT CHANGE UP
WBC # BLD: 10.21 K/UL — SIGNIFICANT CHANGE UP (ref 4.8–10.8)
WBC # FLD AUTO: 10.21 K/UL — SIGNIFICANT CHANGE UP (ref 4.8–10.8)

## 2023-04-08 PROCEDURE — 71045 X-RAY EXAM CHEST 1 VIEW: CPT | Mod: 26

## 2023-04-08 PROCEDURE — 87640 STAPH A DNA AMP PROBE: CPT

## 2023-04-08 PROCEDURE — 84145 PROCALCITONIN (PCT): CPT

## 2023-04-08 PROCEDURE — 85025 COMPLETE CBC W/AUTO DIFF WBC: CPT

## 2023-04-08 PROCEDURE — 87641 MR-STAPH DNA AMP PROBE: CPT

## 2023-04-08 PROCEDURE — 84100 ASSAY OF PHOSPHORUS: CPT

## 2023-04-08 PROCEDURE — 71275 CT ANGIOGRAPHY CHEST: CPT | Mod: 26,MA

## 2023-04-08 PROCEDURE — 93010 ELECTROCARDIOGRAM REPORT: CPT

## 2023-04-08 PROCEDURE — 87449 NOS EACH ORGANISM AG IA: CPT

## 2023-04-08 PROCEDURE — 82962 GLUCOSE BLOOD TEST: CPT

## 2023-04-08 PROCEDURE — 80053 COMPREHEN METABOLIC PANEL: CPT

## 2023-04-08 PROCEDURE — 94640 AIRWAY INHALATION TREATMENT: CPT

## 2023-04-08 PROCEDURE — 83735 ASSAY OF MAGNESIUM: CPT

## 2023-04-08 PROCEDURE — 87899 AGENT NOS ASSAY W/OPTIC: CPT

## 2023-04-08 PROCEDURE — 83036 HEMOGLOBIN GLYCOSYLATED A1C: CPT

## 2023-04-08 PROCEDURE — 99285 EMERGENCY DEPT VISIT HI MDM: CPT

## 2023-04-08 PROCEDURE — 94760 N-INVAS EAR/PLS OXIMETRY 1: CPT

## 2023-04-08 PROCEDURE — 99223 1ST HOSP IP/OBS HIGH 75: CPT

## 2023-04-08 PROCEDURE — 93010 ELECTROCARDIOGRAM REPORT: CPT | Mod: 77

## 2023-04-08 PROCEDURE — 36415 COLL VENOUS BLD VENIPUNCTURE: CPT

## 2023-04-08 PROCEDURE — 80061 LIPID PANEL: CPT

## 2023-04-08 RX ORDER — LANOLIN ALCOHOL/MO/W.PET/CERES
3 CREAM (GRAM) TOPICAL AT BEDTIME
Refills: 0 | Status: DISCONTINUED | OUTPATIENT
Start: 2023-04-08 | End: 2023-04-11

## 2023-04-08 RX ORDER — AMLODIPINE BESYLATE 2.5 MG/1
2.5 TABLET ORAL ONCE
Refills: 0 | Status: COMPLETED | OUTPATIENT
Start: 2023-04-08 | End: 2023-04-08

## 2023-04-08 RX ORDER — AZITHROMYCIN 500 MG/1
500 TABLET, FILM COATED ORAL EVERY 24 HOURS
Refills: 0 | Status: DISCONTINUED | OUTPATIENT
Start: 2023-04-09 | End: 2023-04-11

## 2023-04-08 RX ORDER — IBUPROFEN 200 MG
600 TABLET ORAL ONCE
Refills: 0 | Status: COMPLETED | OUTPATIENT
Start: 2023-04-08 | End: 2023-04-08

## 2023-04-08 RX ORDER — LABETALOL HCL 100 MG
100 TABLET ORAL
Refills: 0 | Status: DISCONTINUED | OUTPATIENT
Start: 2023-04-08 | End: 2023-04-09

## 2023-04-08 RX ORDER — CEFTRIAXONE 500 MG/1
1000 INJECTION, POWDER, FOR SOLUTION INTRAMUSCULAR; INTRAVENOUS EVERY 24 HOURS
Refills: 0 | Status: DISCONTINUED | OUTPATIENT
Start: 2023-04-08 | End: 2023-04-11

## 2023-04-08 RX ORDER — METRONIDAZOLE 500 MG
500 TABLET ORAL ONCE
Refills: 0 | Status: COMPLETED | OUTPATIENT
Start: 2023-04-08 | End: 2023-04-08

## 2023-04-08 RX ORDER — AMLODIPINE BESYLATE 2.5 MG/1
5 TABLET ORAL DAILY
Refills: 0 | Status: DISCONTINUED | OUTPATIENT
Start: 2023-04-08 | End: 2023-04-09

## 2023-04-08 RX ORDER — PANTOPRAZOLE SODIUM 20 MG/1
40 TABLET, DELAYED RELEASE ORAL
Refills: 0 | Status: DISCONTINUED | OUTPATIENT
Start: 2023-04-08 | End: 2023-04-11

## 2023-04-08 RX ORDER — CEFTRIAXONE 500 MG/1
1000 INJECTION, POWDER, FOR SOLUTION INTRAMUSCULAR; INTRAVENOUS ONCE
Refills: 0 | Status: COMPLETED | OUTPATIENT
Start: 2023-04-08 | End: 2023-04-08

## 2023-04-08 RX ORDER — ONDANSETRON 8 MG/1
4 TABLET, FILM COATED ORAL EVERY 8 HOURS
Refills: 0 | Status: DISCONTINUED | OUTPATIENT
Start: 2023-04-08 | End: 2023-04-11

## 2023-04-08 RX ORDER — BUDESONIDE AND FORMOTEROL FUMARATE DIHYDRATE 160; 4.5 UG/1; UG/1
2 AEROSOL RESPIRATORY (INHALATION)
Refills: 0 | Status: DISCONTINUED | OUTPATIENT
Start: 2023-04-08 | End: 2023-04-10

## 2023-04-08 RX ORDER — LABETALOL HCL 100 MG
5 TABLET ORAL ONCE
Refills: 0 | Status: COMPLETED | OUTPATIENT
Start: 2023-04-08 | End: 2023-04-08

## 2023-04-08 RX ORDER — ACETAMINOPHEN 500 MG
650 TABLET ORAL EVERY 6 HOURS
Refills: 0 | Status: DISCONTINUED | OUTPATIENT
Start: 2023-04-08 | End: 2023-04-11

## 2023-04-08 RX ORDER — ALBUTEROL 90 UG/1
1 AEROSOL, METERED ORAL EVERY 4 HOURS
Refills: 0 | Status: DISCONTINUED | OUTPATIENT
Start: 2023-04-08 | End: 2023-04-11

## 2023-04-08 RX ORDER — HYDRALAZINE HCL 50 MG
10 TABLET ORAL ONCE
Refills: 0 | Status: COMPLETED | OUTPATIENT
Start: 2023-04-08 | End: 2023-04-08

## 2023-04-08 RX ORDER — ENOXAPARIN SODIUM 100 MG/ML
40 INJECTION SUBCUTANEOUS EVERY 24 HOURS
Refills: 0 | Status: DISCONTINUED | OUTPATIENT
Start: 2023-04-08 | End: 2023-04-11

## 2023-04-08 RX ORDER — ALBUTEROL 90 UG/1
2.5 AEROSOL, METERED ORAL EVERY 6 HOURS
Refills: 0 | Status: DISCONTINUED | OUTPATIENT
Start: 2023-04-08 | End: 2023-04-11

## 2023-04-08 RX ORDER — ALBUTEROL 90 UG/1
2 AEROSOL, METERED ORAL EVERY 6 HOURS
Refills: 0 | Status: DISCONTINUED | OUTPATIENT
Start: 2023-04-08 | End: 2023-04-11

## 2023-04-08 RX ORDER — AZITHROMYCIN 500 MG/1
TABLET, FILM COATED ORAL
Refills: 0 | Status: DISCONTINUED | OUTPATIENT
Start: 2023-04-08 | End: 2023-04-11

## 2023-04-08 RX ORDER — AZITHROMYCIN 500 MG/1
500 TABLET, FILM COATED ORAL ONCE
Refills: 0 | Status: COMPLETED | OUTPATIENT
Start: 2023-04-08 | End: 2023-04-08

## 2023-04-08 RX ADMIN — AMLODIPINE BESYLATE 2.5 MILLIGRAM(S): 2.5 TABLET ORAL at 23:47

## 2023-04-08 RX ADMIN — AZITHROMYCIN 255 MILLIGRAM(S): 500 TABLET, FILM COATED ORAL at 11:37

## 2023-04-08 RX ADMIN — Medication 100 MILLIGRAM(S): at 08:37

## 2023-04-08 RX ADMIN — Medication 600 MILLIGRAM(S): at 06:51

## 2023-04-08 RX ADMIN — CEFTRIAXONE 100 MILLIGRAM(S): 500 INJECTION, POWDER, FOR SOLUTION INTRAMUSCULAR; INTRAVENOUS at 08:37

## 2023-04-08 RX ADMIN — AMLODIPINE BESYLATE 5 MILLIGRAM(S): 2.5 TABLET ORAL at 18:38

## 2023-04-08 RX ADMIN — Medication 100 MILLIGRAM(S): at 18:51

## 2023-04-08 RX ADMIN — Medication 5 MILLIGRAM(S): at 21:32

## 2023-04-08 RX ADMIN — Medication 10 MILLIGRAM(S): at 17:04

## 2023-04-08 RX ADMIN — Medication 40 MILLIGRAM(S): at 18:38

## 2023-04-08 NOTE — PATIENT PROFILE ADULT - FALL HARM RISK - UNIVERSAL INTERVENTIONS
Bed in lowest position, wheels locked, appropriate side rails in place/Call bell, personal items and telephone in reach/Instruct patient to call for assistance before getting out of bed or chair/Non-slip footwear when patient is out of bed/[place] to call system/Physically safe environment - no spills, clutter or unnecessary equipment/Purposeful Proactive Rounding/Room/bathroom lighting operational, light cord in reach

## 2023-04-08 NOTE — H&P ADULT - HISTORY OF PRESENT ILLNESS
43-year-old female with past medical history of HTN, asthma who presents for shortness of breath.  Patient states that she woke up short of breath.  Patient recently traveled from [place] yesterday.  States she is unable to take a full breath, and has a new cough.      ED vitals   T(F): 98.6 (04-08-23 @ 07:44), Max: 98.6 (04-08-23 @ 07:44)  HR: 81 (04-08-23 @ 07:44) (81 - 88)  BP: 190/99 (04-08-23 @ 07:44) (110/82 - 190/99)  RR: 20 (04-08-23 @ 07:44) (18 - 22)  SpO2: 98% (04-08-23 @ 07:44) (98% - 100%)    Labs significant for Hb 12.7, HCT 39.3, WBC 10.21, , TPro  7.0  /  Alb  4.0  /  TBili  0.3  /  DBili  x   /  AST  20  /  ALT  28  /  AlkPhos  127<H>  04-08    139  |  104  |  13  ----------------------------<  262<H>  3.5   |  24  |  0.7    Ca    8.9      08 Apr 2023 03:05    CTA chest : neg for PE. Right lung areas of groundglass attenuation, suggestive of atypical pneumonia                43-year-old female with past medical history of HTN, asthma who presents for shortness of breath and cough for 1 day.  Patient states that she woke up last night due to shortness of breath and cough. Patient recently traveled from [place] yesterday.  States she is unable to take a full breath, and has a new cough with greenish sputum production since last night. Patient is also endorsing sore throat and chills, denies any fever. Patient denies chest pain or palpitation. Patient denies any abdominal pain, n/v/d/c. Patient denies any sick contact. Patient denies any hx of alcohol use, tobacco use or any vaping hx. Patient had last asthma exacerbation 1 year ago, no hx of ICU admission or intubation. Patient was saturating 92% on RA. Patient is vaccine against COVID 19 X2. Upon arrival to the ED patient was placed on nonrebreather mask 15l AND WAS SATURATING 100%. S/P Ceftriaxone and Flagyl in the ED.     ED vitals   T(F): 98.6 (04-08-23 @ 07:44), Max: 98.6 (04-08-23 @ 07:44)  HR: 81 (04-08-23 @ 07:44) (81 - 88)  BP: 190/99 (04-08-23 @ 07:44) (110/82 - 190/99)  RR: 20 (04-08-23 @ 07:44) (18 - 22)  SpO2: 98% (04-08-23 @ 07:44) (98% - 100%) on 2L NC     Labs significant for Hb 12.7, HCT 39.3, WBC 10.21, , TPro  7.0  /  Alb  4.0  /  TBili  0.3  /  DBili  x   /  AST  20  /  ALT  28  /  AlkPhos  127<H>  04-08    139  |  104  |  13  ----------------------------<  262<H>  3.5   |  24  |  0.7    Ca    8.9      08 Apr 2023 03:05    CTA chest : neg for PE. Right lung areas of groundglass attenuation, suggestive of atypical pneumonia                43-year-old female with past medical history of HTN, asthma who presents for shortness of breath and cough for 1 day.  Patient states that she woke up last night due to shortness of breath and cough. Patient recently traveled from [place] yesterday.  States she is unable to take a full breath, and has a new cough with greenish sputum production since last night. Patient is also endorsing sore throat and chills, denies any fever. Patient denies chest pain or palpitation. Patient denies any abdominal pain, n/v/d/c. Patient denies any sick contact. Patient denies any hx of alcohol use, tobacco use or any vaping hx. Patient had last asthma exacerbation 1 year ago, no hx of ICU admission or intubation. Patient was saturating 92% on RA. Patient is vaccine against COVID 19 X2. Upon arrival to the ED patient was placed on nonrebreather mask 15l AND WAS SATURATING 100%. S/P Ceftriaxone and Flagyl in the ED.     ED vitals   T(F): 98.6 (04-08-23 @ 07:44), Max: 98.6 (04-08-23 @ 07:44)  HR: 81 (04-08-23 @ 07:44) (81 - 88)  BP: 190/99 (04-08-23 @ 07:44) (110/82 - 190/99)  RR: 20 (04-08-23 @ 07:44) (18 - 22)  SpO2: 98% (04-08-23 @ 07:44) (98% - 100%) on 2L NC     Labs significant for Hb 12.7, HCT 39.3, WBC 10.21, , TPro  7.0  /  Alb  4.0  /  TBili  0.3  /  DBili  x   /  AST  20  /  ALT  28  /  AlkPhos  127<H>  04-08    139  |  104  |  13  ----------------------------<  262<H>  3.5   |  24  |  0.7    Ca    8.9      08 Apr 2023 03:05    CTA chest : neg for PE. Right lung areas of ground glass attenuation, suggestive of atypical pneumonia

## 2023-04-08 NOTE — H&P ADULT - ATTENDING COMMENTS
HPI:  43-year-old female with past medical history of HTN, asthma who presents for shortness of breath and cough for 1 day.  Patient states that she woke up last night due to shortness of breath and cough. Patient recently traveled from [place] yesterday.  States she is unable to take a full breath, and has a new cough with greenish sputum production since last night. Patient is also endorsing sore throat and chills, denies any fever. Patient denies chest pain or palpitation. Patient denies any abdominal pain, n/v/d/c. Patient denies any sick contact. Patient denies any hx of alcohol use, tobacco use or any vaping hx. Patient had last asthma exacerbation 1 year ago, no hx of ICU admission or intubation. Patient was saturating 92% on RA. Patient is vaccine against COVID 19 X2. Upon arrival to the ED patient was placed on nonrebreather mask 15l AND WAS SATURATING 100%. S/P Ceftriaxone and Flagyl in the ED.     T(F): 98.6 (04-08-23 @ 07:44), Max: 98.6 (04-08-23 @ 07:44)  HR: 81 (04-08-23 @ 07:44) (81 - 88)  BP: 190/99 (04-08-23 @ 07:44) (110/82 - 190/99)  RR: 20 (04-08-23 @ 07:44) (18 - 22)  SpO2: 98% (04-08-23 @ 07:44) (98% - 100%)  Physical exam:   constitutional NAD, AAOX3, Respiratory  lungs CTA, CVS heart RRR, GI: abdomen Soft NT, ND, BS+, skin: intact  neuro exam Motor, sensory and CN normal, no deficit                           12.7   10.21 )-----------( 237      ( 08 Apr 2023 03:05 )             39.3       139  |  104  |  13  ----------------------------<  262<H>  3.5   |  24  |  0.7    Ca    8.9      08 Apr 2023 03:05    CTA chest : neg for PE. Right lung areas of ground glass attenuation, suggestive of atypical pneumonia     a/p  # possible acute pna, vs bronchitis, asthma exc , cont abx, steroids, nebs    # HTN cont meds    # hyperglycemia in bmp, check a1c, monitor fingersticks,     #Progress Note Handoff  Pending (specify): clinical improvement   Family discussion: martine pt   Disposition: Home_

## 2023-04-08 NOTE — ED ADULT TRIAGE NOTE - CHIEF COMPLAINT QUOTE
Pt with only hx of HTN came c/o acute and sudden onset cough and SOB started 30 minutes ago, pt was 92% on RA, pt just came from [place] and had 9 hours drive, had 3 stops on the way, not on any birth control pills. Pt c/o chest tightness.

## 2023-04-08 NOTE — ED PROVIDER NOTE - OBJECTIVE STATEMENT
43-year-old female with past medical history of HTN, asthma who presents for shortness of breath.  Patient states that she woke up short of breath.  Patient recently traveled from [place] yesterday.  States she is unable to take a full breath, and has a new cough.  Denies fevers, chills, chest pain, palpitations, nausea, vomiting, diarrhea, abdominal pain, syncope, weakness, numbness.  Denies tobacco use, alcohol use, substance use.

## 2023-04-08 NOTE — ED ADULT NURSE REASSESSMENT NOTE - NS ED NURSE REASSESS COMMENT FT1
patient awake/alert/oriented. v/s stable. no acute distress. iv patent. safety maintained.
Pt awaiting CT results. NAD. Maintained on 3L via NC. Pt c/o headache. Dr. Meraz made aware. Pain medication administered as ordered. Call bell within reach. Safety measures maintained. Will endorse accordingly.

## 2023-04-08 NOTE — H&P ADULT - NSHPLABSRESULTS_GEN_ALL_CORE
< from: CT Angio Chest PE Protocol w/ IV Cont (04.08.23 @ 06:45) >    FINDINGS:    PULMONARY EMBOLUS: No evidence of acute pulmonary embolism.    LUNGS, PLEURA, AIRWAYS: Right lung areas of groundglass attenuation,   suggestive of atypical pneumonia in the appropriate clinical setting   central airway patent. No lobar consolidation, pleural effusion or   pneumothorax.    THORACIC NODES: No mediastinal, hilar, supraclavicular, or axillary   lymphadenopathy.    MEDIASTINUM/GREAT VESSELS: No pericardial effusion. Heart size is within   normal limits. The aorta and main pulmonary artery are of normal caliber.    BONES/SOFT TISSUES: Unremarkable.    VISUALIZED UPPER ABDOMEN: Unremarkable.      IMPRESSION:  1. Right lung areas of groundglass attenuation, suggestive of atypical   pneumonia in the appropriate clinical setting central airway patent.  2. No evidence of acute pulmonary embolism.    --- End of Report ---    < end of copied text >

## 2023-04-08 NOTE — ED PROVIDER NOTE - PHYSICAL EXAMINATION
VITAL SIGNS: I have reviewed nursing notes and confirm.  CONSTITUTIONAL: ill-appearing, non-toxic, in acute respiratory distress.   SKIN: Warm dry, normal skin turgor  HEAD: NCAT  EYES: EOMI, PERRLA, no scleral icterus  ENT: Moist mucous membranes, normal pharynx with no erythema or exudates  NECK: Supple; non tender. Full ROM. No cervical LAD  CARD: Tachycardic, regular rhythm, no murmurs, rubs or gallops  RESP: Bilateral rales on auscultation.   ABD: soft, + BS, non-tender, non-distended, no rebound or guarding. No CVA tenderness  EXT: Full ROM, no bony tenderness, no pedal edema, no calf tenderness  NEURO: normal motor. normal sensory. CN II-XII intact. Cerebellar testing normal. Normal gait.  PSYCH: Cooperative, appropriate.

## 2023-04-08 NOTE — ED ADULT NURSE NOTE - NSIMPLEMENTINTERV_GEN_ALL_ED
Implemented All Universal Safety Interventions:  [place] to call system. Call bell, personal items and telephone within reach. Instruct patient to call for assistance. Room bathroom lighting operational. Non-slip footwear when patient is off stretcher. Physically safe environment: no spills, clutter or unnecessary equipment. Stretcher in lowest position, wheels locked, appropriate side rails in place.

## 2023-04-08 NOTE — ED PROVIDER NOTE - ATTENDING CONTRIBUTION TO CARE
I personally evaluated the patient. I reviewed the Resident’s or Physician Assistant’s note (as assigned above), and agree with the findings and plan except as documented in my note.    43 female with no significant past medical history, states that she was never formally diagnosed with hypertension, presented with complaints of acute onset shortness of breath.  Patient reports associated chills traveling from left lower leg.  He was on antibiotic prescribed and she tolerated it well.  He stated that when she was trying to go to sleep about 3 hours she became acutely short of breath and developed a cough.  Associated with chest pain.  Denies dizziness, VSS, non toxic appearing, NAD, Head NCAT, MMM, neck supple, normal ROM, normal s1s2, lungs with bilateral crackles, mildly increased work of breathing, abd s/nt/nd, no guarding or rebound, extremities wnl, AAO x 3, GCS 15, neuro grossly normal. No acute skin lesions. Plan is EKG, meds, labs, imaging, and reassess.

## 2023-04-08 NOTE — ED ADULT NURSE NOTE - OBJECTIVE STATEMENT
42 y/o female presented to ED c/o chest pain,SOB, and cough that started 30 minutes ago. Pt given one nebulizer treatment POA. Pt with hx of asthma. Pt just arrived from 9 hour long drive from [place].

## 2023-04-08 NOTE — ED PROVIDER NOTE - CLINICAL SUMMARY MEDICAL DECISION MAKING FREE TEXT BOX
Patient presents with acute shortness o of breath.  Required IV, O2, cardiac monitor, labs, imaging, EKG.  CT chest showed likely pneumonia.  Patient received antibiotics.  Will be admitted for further management.

## 2023-04-08 NOTE — ED PROVIDER NOTE - PROGRESS NOTE DETAILS
[place]: BiPAP ordered for patient.  Respiratory therapist attempted to place patient on it however she did not tolerated.  Patient currently on oxygen by nasal cannula.

## 2023-04-08 NOTE — H&P ADULT - ASSESSMENT
43-year-old female with past medical history of HTN, asthma who presents for shortness of breath and cough for 1 day.  Patient states that she woke up last night due to shortness of breath and cough. Patient recently traveled from [place] yesterday.  CTA neg for PE but showing Right lung areas of ground glass attenuation, suggestive of atypical pneumonia.     #Acute hypoxic respiratory failure 2/2 PNA   #Hx of Asthma   - Afebrile, no leukocytosis   - CTA chest : neg for PE. Right lung areas of ground glass attenuation, suggestive of atypical pneumonia   - D-Dimer 244  - Troponin 0.01, proBNP 283  - Patient is saturating 100% on 2L NC, wean off oxygen   - F/u Bcx, Sputum cx  - F/u Procal   - F/u RVP panel, COVID PCR  - F/u MRSA nares   - F/u Urine strep and Legionella   - S/P Ceftriaxone and Flagyl in the ED.   - c/w Ceftriaxone and azithromycin, pending infectious w/u.   - c/w albuterol PRN and Symbicort    - c/w Prednisone 40mg for 5 days   - Patient will need outpatient pulmonary follow up after discharge       #HTN   - not on any meds at home   - Monitor BP  - Diet and lifestyle modifications   - can start antihypertensives if BP remains elevated      #Obesity   - Diet and lifestyle modifications   - F/u Lipid profile and A1C    #Misc  - DVT prophylaxis: Lovenox   - GI prophylaxis: not indicated  - Diet: DASH   - Activity: IAT   - Disposition: admit to medicine      43-year-old female with past medical history of HTN, asthma who presents for shortness of breath and cough for 1 day.  Patient states that she woke up last night due to shortness of breath and cough. Patient recently traveled from [place] yesterday.  CTA neg for PE but showing Right lung areas of ground glass attenuation, suggestive of atypical pneumonia.     #Acute hypoxic respiratory failure 2/2 PNA   #Hx of Asthma   - Afebrile, no leukocytosis   - CTA chest : neg for PE. Right lung areas of ground glass attenuation, suggestive of atypical pneumonia   - D-Dimer 244  - Troponin 0.01, proBNP 283  - Patient is saturating 100% on 2L NC, wean off oxygen   - F/u Bcx, Sputum cx  - F/u Procal   - F/u RVP panel, COVID PCR  - F/u MRSA nares   - F/u Urine strep and Legionella   - S/P Ceftriaxone and Flagyl in the ED.   - c/w Ceftriaxone and azithromycin, pending infectious w/u.   - c/w albuterol PRN and Symbicort    - c/w Prednisone 40mg for 5 days   - Patient will need outpatient pulmonary follow up after discharge       #HTN   - not on any meds at home   - Monitor BP  - Diet and lifestyle modifications   - can start antihypertensives if BP remains elevated      #Obesity   - Diet and lifestyle modifications   - F/u Lipid profile and A1C    #Misc  - DVT prophylaxis: Lovenox   - GI prophylaxis: PPI  - Diet: DASH   - Activity: IAT   - Disposition: admit to medicine      43-year-old female with past medical history of HTN, asthma who presents for shortness of breath and cough for 1 day.  Patient states that she woke up last night due to shortness of breath and cough. Patient recently traveled from [place] yesterday.  CTA neg for PE but showing Right lung areas of ground glass attenuation, suggestive of atypical pneumonia.     #Acute hypoxic respiratory failure 2/2 PNA   #Hx of Asthma   - Afebrile, no leukocytosis   - CTA chest : neg for PE. Right lung areas of ground glass attenuation, suggestive of atypical pneumonia   - D-Dimer 244  - Troponin 0.01, proBNP 283  - Patient is saturating 100% on 2L NC, wean off oxygen   - F/u Bcx, Sputum cx  - F/u Procal   - F/u RVP panel, COVID PCR  - F/u MRSA nares   - F/u Urine strep and Legionella   - S/P Ceftriaxone and Flagyl in the ED.   - c/w Ceftriaxone and azithromycin, pending infectious w/u.   - c/w albuterol PRN and Symbicort    - c/w Prednisone 40mg for 5 days   - Deana Bills for cough   - Patient will need outpatient pulmonary follow up after discharge       #HTN   - not on any meds at home   - Monitor BP  - Diet and lifestyle modifications   - can start antihypertensives if BP remains elevated      #Obesity   - Diet and lifestyle modifications   - F/u Lipid profile and A1C    #Misc  - DVT prophylaxis: Lovenox   - GI prophylaxis: PPI  - Diet: DASH   - Activity: IAT   - Disposition: admit to medicine      43-year-old female with past medical history of HTN, asthma who presents for shortness of breath and cough for 1 day.  Patient states that she woke up last night due to shortness of breath and cough. Patient recently traveled from [place] yesterday.  CTA neg for PE but showing Right lung areas of ground glass attenuation, suggestive of atypical pneumonia.     #Acute hypoxic respiratory failure 2/2 PNA   #Hx of Asthma   - Afebrile, no leukocytosis   - CTA chest : neg for PE. Right lung areas of ground glass attenuation, suggestive of atypical pneumonia   - D-Dimer 244  - Troponin 0.01, proBNP 283  - Patient is saturating 100% on 2L NC, wean off oxygen   - F/u Sputum cx, Procal   - F/u RVP panel, COVID PCR  - F/u MRSA nares   - F/u Urine strep and Legionella   - S/P Ceftriaxone and Flagyl in the ED.   - c/w Ceftriaxone and azithromycin, pending infectious w/u.   - c/w albuterol PRN and Symbicort    - c/w Prednisone 40mg for 5 days   - Deana Bills for cough   - Patient will need outpatient pulmonary follow up after discharge       #HTN   - not on any meds at home   - Monitor BP  - Diet and lifestyle modifications   - can start antihypertensives if BP remains elevated      #Obesity   - Diet and lifestyle modifications   - F/u Lipid profile and A1C    #Misc  - DVT prophylaxis: Lovenox   - GI prophylaxis: PPI  - Diet: DASH   - Activity: IAT   - Disposition: admit to medicine

## 2023-04-08 NOTE — H&P ADULT - NSHPPHYSICALEXAM_GEN_ALL_CORE
LOS:     VITALS:   T(C): 37 (04-08-23 @ 07:44), Max: 37 (04-08-23 @ 07:44)  HR: 81 (04-08-23 @ 07:44) (81 - 88)  BP: 190/99 (04-08-23 @ 07:44) (110/82 - 190/99)  RR: 20 (04-08-23 @ 07:44) (18 - 22)  SpO2: 98% (04-08-23 @ 07:44) (98% - 100%)    GENERAL: NAD, lying in bed comfortably  HEAD:  Atraumatic, Normocephalic  EYES: EOMI, PERRLA, conjunctiva and sclera clear  ENT: Moist mucous membranes  NECK: Supple, No JVD  CHEST/LUNG: Clear to auscultation bilaterally; No rales, rhonchi, wheezing, or rubs. Unlabored respirations  HEART: Regular rate and rhythm; No murmurs, rubs, or gallops  ABDOMEN: BSx4; Soft, nontender, nondistended  EXTREMITIES:  2+ Peripheral Pulses, brisk capillary refill. No clubbing, cyanosis, or edema  NERVOUS SYSTEM:  A&Ox3, no focal deficits   SKIN: No rashes or lesions

## 2023-04-09 LAB
ALBUMIN SERPL ELPH-MCNC: 4.3 G/DL — SIGNIFICANT CHANGE UP (ref 3.5–5.2)
ALP SERPL-CCNC: 115 U/L — SIGNIFICANT CHANGE UP (ref 30–115)
ALT FLD-CCNC: 26 U/L — SIGNIFICANT CHANGE UP (ref 0–41)
ANION GAP SERPL CALC-SCNC: 17 MMOL/L — HIGH (ref 7–14)
AST SERPL-CCNC: 19 U/L — SIGNIFICANT CHANGE UP (ref 0–41)
BASOPHILS # BLD AUTO: 0.03 K/UL — SIGNIFICANT CHANGE UP (ref 0–0.2)
BASOPHILS NFR BLD AUTO: 0.2 % — SIGNIFICANT CHANGE UP (ref 0–1)
BILIRUB SERPL-MCNC: 0.3 MG/DL — SIGNIFICANT CHANGE UP (ref 0.2–1.2)
BUN SERPL-MCNC: 15 MG/DL — SIGNIFICANT CHANGE UP (ref 10–20)
CALCIUM SERPL-MCNC: 9.7 MG/DL — SIGNIFICANT CHANGE UP (ref 8.4–10.5)
CHLORIDE SERPL-SCNC: 102 MMOL/L — SIGNIFICANT CHANGE UP (ref 98–110)
CHOLEST SERPL-MCNC: 227 MG/DL — HIGH
CO2 SERPL-SCNC: 19 MMOL/L — SIGNIFICANT CHANGE UP (ref 17–32)
CREAT SERPL-MCNC: 0.7 MG/DL — SIGNIFICANT CHANGE UP (ref 0.7–1.5)
EGFR: 110 ML/MIN/1.73M2 — SIGNIFICANT CHANGE UP
EOSINOPHIL # BLD AUTO: 0 K/UL — SIGNIFICANT CHANGE UP (ref 0–0.7)
EOSINOPHIL NFR BLD AUTO: 0 % — SIGNIFICANT CHANGE UP (ref 0–8)
GLUCOSE BLDC GLUCOMTR-MCNC: 300 MG/DL — HIGH (ref 70–99)
GLUCOSE BLDC GLUCOMTR-MCNC: 308 MG/DL — HIGH (ref 70–99)
GLUCOSE BLDC GLUCOMTR-MCNC: 334 MG/DL — HIGH (ref 70–99)
GLUCOSE BLDC GLUCOMTR-MCNC: 337 MG/DL — HIGH (ref 70–99)
GLUCOSE BLDC GLUCOMTR-MCNC: 367 MG/DL — HIGH (ref 70–99)
GLUCOSE SERPL-MCNC: 256 MG/DL — HIGH (ref 70–99)
HCT VFR BLD CALC: 40.9 % — SIGNIFICANT CHANGE UP (ref 37–47)
HDLC SERPL-MCNC: 36 MG/DL — LOW
HGB BLD-MCNC: 13.4 G/DL — SIGNIFICANT CHANGE UP (ref 12–16)
IMM GRANULOCYTES NFR BLD AUTO: 0.2 % — SIGNIFICANT CHANGE UP (ref 0.1–0.3)
LIPID PNL WITH DIRECT LDL SERPL: 157 MG/DL — HIGH
LYMPHOCYTES # BLD AUTO: 1.33 K/UL — SIGNIFICANT CHANGE UP (ref 1.2–3.4)
LYMPHOCYTES # BLD AUTO: 10.1 % — LOW (ref 20.5–51.1)
MAGNESIUM SERPL-MCNC: 2.1 MG/DL — SIGNIFICANT CHANGE UP (ref 1.8–2.4)
MCHC RBC-ENTMCNC: 26.2 PG — LOW (ref 27–31)
MCHC RBC-ENTMCNC: 32.8 G/DL — SIGNIFICANT CHANGE UP (ref 32–37)
MCV RBC AUTO: 79.9 FL — LOW (ref 81–99)
MONOCYTES # BLD AUTO: 0.16 K/UL — SIGNIFICANT CHANGE UP (ref 0.1–0.6)
MONOCYTES NFR BLD AUTO: 1.2 % — LOW (ref 1.7–9.3)
NEUTROPHILS # BLD AUTO: 11.67 K/UL — HIGH (ref 1.4–6.5)
NEUTROPHILS NFR BLD AUTO: 88.3 % — HIGH (ref 42.2–75.2)
NON HDL CHOLESTEROL: 191 MG/DL — HIGH
NRBC # BLD: 0 /100 WBCS — SIGNIFICANT CHANGE UP (ref 0–0)
PLATELET # BLD AUTO: 243 K/UL — SIGNIFICANT CHANGE UP (ref 130–400)
POTASSIUM SERPL-MCNC: 4.2 MMOL/L — SIGNIFICANT CHANGE UP (ref 3.5–5)
POTASSIUM SERPL-SCNC: 4.2 MMOL/L — SIGNIFICANT CHANGE UP (ref 3.5–5)
PROCALCITONIN SERPL-MCNC: 0.05 NG/ML — SIGNIFICANT CHANGE UP (ref 0.02–0.1)
PROT SERPL-MCNC: 7.6 G/DL — SIGNIFICANT CHANGE UP (ref 6–8)
RBC # BLD: 5.12 M/UL — SIGNIFICANT CHANGE UP (ref 4.2–5.4)
RBC # FLD: 15 % — HIGH (ref 11.5–14.5)
SODIUM SERPL-SCNC: 138 MMOL/L — SIGNIFICANT CHANGE UP (ref 135–146)
TRIGL SERPL-MCNC: 168 MG/DL — HIGH
WBC # BLD: 13.22 K/UL — HIGH (ref 4.8–10.8)
WBC # FLD AUTO: 13.22 K/UL — HIGH (ref 4.8–10.8)

## 2023-04-09 RX ORDER — SODIUM CHLORIDE 9 MG/ML
1000 INJECTION, SOLUTION INTRAVENOUS
Refills: 0 | Status: DISCONTINUED | OUTPATIENT
Start: 2023-04-09 | End: 2023-04-11

## 2023-04-09 RX ORDER — DEXTROSE 50 % IN WATER 50 %
12.5 SYRINGE (ML) INTRAVENOUS ONCE
Refills: 0 | Status: DISCONTINUED | OUTPATIENT
Start: 2023-04-09 | End: 2023-04-11

## 2023-04-09 RX ORDER — DEXTROSE 50 % IN WATER 50 %
15 SYRINGE (ML) INTRAVENOUS ONCE
Refills: 0 | Status: DISCONTINUED | OUTPATIENT
Start: 2023-04-09 | End: 2023-04-11

## 2023-04-09 RX ORDER — DEXTROSE 50 % IN WATER 50 %
25 SYRINGE (ML) INTRAVENOUS ONCE
Refills: 0 | Status: DISCONTINUED | OUTPATIENT
Start: 2023-04-09 | End: 2023-04-11

## 2023-04-09 RX ORDER — GLUCAGON INJECTION, SOLUTION 0.5 MG/.1ML
1 INJECTION, SOLUTION SUBCUTANEOUS ONCE
Refills: 0 | Status: DISCONTINUED | OUTPATIENT
Start: 2023-04-09 | End: 2023-04-11

## 2023-04-09 RX ORDER — NIFEDIPINE 30 MG
60 TABLET, EXTENDED RELEASE 24 HR ORAL DAILY
Refills: 0 | Status: DISCONTINUED | OUTPATIENT
Start: 2023-04-09 | End: 2023-04-11

## 2023-04-09 RX ORDER — INSULIN LISPRO 100/ML
10 VIAL (ML) SUBCUTANEOUS ONCE
Refills: 0 | Status: COMPLETED | OUTPATIENT
Start: 2023-04-09 | End: 2023-04-09

## 2023-04-09 RX ORDER — INSULIN GLARGINE 100 [IU]/ML
10 INJECTION, SOLUTION SUBCUTANEOUS AT BEDTIME
Refills: 0 | Status: DISCONTINUED | OUTPATIENT
Start: 2023-04-09 | End: 2023-04-10

## 2023-04-09 RX ORDER — INSULIN LISPRO 100/ML
VIAL (ML) SUBCUTANEOUS
Refills: 0 | Status: DISCONTINUED | OUTPATIENT
Start: 2023-04-09 | End: 2023-04-11

## 2023-04-09 RX ADMIN — Medication 10 UNIT(S): at 19:07

## 2023-04-09 RX ADMIN — CEFTRIAXONE 100 MILLIGRAM(S): 500 INJECTION, POWDER, FOR SOLUTION INTRAMUSCULAR; INTRAVENOUS at 09:40

## 2023-04-09 RX ADMIN — ALBUTEROL 2.5 MILLIGRAM(S): 90 AEROSOL, METERED ORAL at 14:22

## 2023-04-09 RX ADMIN — Medication 40 MILLIGRAM(S): at 05:13

## 2023-04-09 RX ADMIN — PANTOPRAZOLE SODIUM 40 MILLIGRAM(S): 20 TABLET, DELAYED RELEASE ORAL at 05:12

## 2023-04-09 RX ADMIN — Medication 60 MILLIGRAM(S): at 12:57

## 2023-04-09 RX ADMIN — AZITHROMYCIN 255 MILLIGRAM(S): 500 TABLET, FILM COATED ORAL at 11:49

## 2023-04-09 RX ADMIN — INSULIN GLARGINE 10 UNIT(S): 100 INJECTION, SOLUTION SUBCUTANEOUS at 22:51

## 2023-04-09 RX ADMIN — ALBUTEROL 2.5 MILLIGRAM(S): 90 AEROSOL, METERED ORAL at 21:11

## 2023-04-09 RX ADMIN — Medication 10: at 17:57

## 2023-04-09 RX ADMIN — ENOXAPARIN SODIUM 40 MILLIGRAM(S): 100 INJECTION SUBCUTANEOUS at 09:40

## 2023-04-09 RX ADMIN — Medication 100 MILLIGRAM(S): at 05:13

## 2023-04-09 RX ADMIN — Medication 40 MILLIGRAM(S): at 17:58

## 2023-04-09 RX ADMIN — AMLODIPINE BESYLATE 5 MILLIGRAM(S): 2.5 TABLET ORAL at 05:13

## 2023-04-09 RX ADMIN — ALBUTEROL 2.5 MILLIGRAM(S): 90 AEROSOL, METERED ORAL at 09:19

## 2023-04-09 NOTE — CHART NOTE - NSCHARTNOTEFT_GEN_A_CORE
Called to evaluate patient for hypertension to 194/86. Per chart review, patient previously taking labetalol but patient denies currently taking any medications for hypertension. Chart review also w/ history of asthma, patient endorses hx of asthma but states that it has resolved.    Yesterday evening patient started on labetalol 100mg PO BID, amlodipine 5mg PO QD.    Medication changes as follows:  - Stop amlodipine (BP not responding quickly enough to long-acting CCB)  - Stop labetalol (should attempt BP control w/ nifedipine + chlorthalidone prior to initiating non-selective BB in pt w/ hx of asthma)  - Will not start ACE or ARB as patient states that she is pre-menopausal  - Start nifedipine XL 60mg PO QD -> titrate up to 90mg if necessary  - Assess response; if BP not responding -> start chlorthalidone 12.5mg PO QD, titrate up to 25mg if necessary

## 2023-04-10 LAB
A1C WITH ESTIMATED AVERAGE GLUCOSE RESULT: 9.7 % — HIGH (ref 4–5.6)
A1C WITH ESTIMATED AVERAGE GLUCOSE RESULT: 9.7 % — HIGH (ref 4–5.6)
ALBUMIN SERPL ELPH-MCNC: 4.3 G/DL — SIGNIFICANT CHANGE UP (ref 3.5–5.2)
ALP SERPL-CCNC: 117 U/L — HIGH (ref 30–115)
ALT FLD-CCNC: 25 U/L — SIGNIFICANT CHANGE UP (ref 0–41)
ANION GAP SERPL CALC-SCNC: 13 MMOL/L — SIGNIFICANT CHANGE UP (ref 7–14)
AST SERPL-CCNC: 17 U/L — SIGNIFICANT CHANGE UP (ref 0–41)
BASOPHILS # BLD AUTO: 0.03 K/UL — SIGNIFICANT CHANGE UP (ref 0–0.2)
BASOPHILS NFR BLD AUTO: 0.2 % — SIGNIFICANT CHANGE UP (ref 0–1)
BILIRUB SERPL-MCNC: 0.5 MG/DL — SIGNIFICANT CHANGE UP (ref 0.2–1.2)
BUN SERPL-MCNC: 17 MG/DL — SIGNIFICANT CHANGE UP (ref 10–20)
CALCIUM SERPL-MCNC: 9.3 MG/DL — SIGNIFICANT CHANGE UP (ref 8.4–10.5)
CHLORIDE SERPL-SCNC: 99 MMOL/L — SIGNIFICANT CHANGE UP (ref 98–110)
CO2 SERPL-SCNC: 22 MMOL/L — SIGNIFICANT CHANGE UP (ref 17–32)
CREAT SERPL-MCNC: 0.6 MG/DL — LOW (ref 0.7–1.5)
EGFR: 114 ML/MIN/1.73M2 — SIGNIFICANT CHANGE UP
EOSINOPHIL # BLD AUTO: 0 K/UL — SIGNIFICANT CHANGE UP (ref 0–0.7)
EOSINOPHIL NFR BLD AUTO: 0 % — SIGNIFICANT CHANGE UP (ref 0–8)
ESTIMATED AVERAGE GLUCOSE: 232 MG/DL — HIGH (ref 68–114)
ESTIMATED AVERAGE GLUCOSE: 232 MG/DL — HIGH (ref 68–114)
GLUCOSE BLDC GLUCOMTR-MCNC: 295 MG/DL — HIGH (ref 70–99)
GLUCOSE BLDC GLUCOMTR-MCNC: 295 MG/DL — HIGH (ref 70–99)
GLUCOSE BLDC GLUCOMTR-MCNC: 314 MG/DL — HIGH (ref 70–99)
GLUCOSE BLDC GLUCOMTR-MCNC: 372 MG/DL — HIGH (ref 70–99)
GLUCOSE SERPL-MCNC: 275 MG/DL — HIGH (ref 70–99)
HCT VFR BLD CALC: 39.7 % — SIGNIFICANT CHANGE UP (ref 37–47)
HGB BLD-MCNC: 12.9 G/DL — SIGNIFICANT CHANGE UP (ref 12–16)
IMM GRANULOCYTES NFR BLD AUTO: 0.7 % — HIGH (ref 0.1–0.3)
LEGIONELLA AG UR QL: NEGATIVE — SIGNIFICANT CHANGE UP
LYMPHOCYTES # BLD AUTO: 1.86 K/UL — SIGNIFICANT CHANGE UP (ref 1.2–3.4)
LYMPHOCYTES # BLD AUTO: 10.1 % — LOW (ref 20.5–51.1)
MAGNESIUM SERPL-MCNC: 2.3 MG/DL — SIGNIFICANT CHANGE UP (ref 1.8–2.4)
MCHC RBC-ENTMCNC: 26.7 PG — LOW (ref 27–31)
MCHC RBC-ENTMCNC: 32.5 G/DL — SIGNIFICANT CHANGE UP (ref 32–37)
MCV RBC AUTO: 82 FL — SIGNIFICANT CHANGE UP (ref 81–99)
MONOCYTES # BLD AUTO: 0.38 K/UL — SIGNIFICANT CHANGE UP (ref 0.1–0.6)
MONOCYTES NFR BLD AUTO: 2.1 % — SIGNIFICANT CHANGE UP (ref 1.7–9.3)
NEUTROPHILS # BLD AUTO: 15.97 K/UL — HIGH (ref 1.4–6.5)
NEUTROPHILS NFR BLD AUTO: 86.9 % — HIGH (ref 42.2–75.2)
NRBC # BLD: 0 /100 WBCS — SIGNIFICANT CHANGE UP (ref 0–0)
PLATELET # BLD AUTO: 245 K/UL — SIGNIFICANT CHANGE UP (ref 130–400)
POTASSIUM SERPL-MCNC: 4.4 MMOL/L — SIGNIFICANT CHANGE UP (ref 3.5–5)
POTASSIUM SERPL-SCNC: 4.4 MMOL/L — SIGNIFICANT CHANGE UP (ref 3.5–5)
PROT SERPL-MCNC: 7.5 G/DL — SIGNIFICANT CHANGE UP (ref 6–8)
RBC # BLD: 4.84 M/UL — SIGNIFICANT CHANGE UP (ref 4.2–5.4)
RBC # FLD: 15.3 % — HIGH (ref 11.5–14.5)
S PNEUM AG UR QL: NEGATIVE — SIGNIFICANT CHANGE UP
SODIUM SERPL-SCNC: 134 MMOL/L — LOW (ref 135–146)
WBC # BLD: 18.36 K/UL — HIGH (ref 4.8–10.8)
WBC # FLD AUTO: 18.36 K/UL — HIGH (ref 4.8–10.8)

## 2023-04-10 PROCEDURE — 99233 SBSQ HOSP IP/OBS HIGH 50: CPT

## 2023-04-10 RX ORDER — INSULIN GLARGINE 100 [IU]/ML
5 INJECTION, SOLUTION SUBCUTANEOUS ONCE
Refills: 0 | Status: COMPLETED | OUTPATIENT
Start: 2023-04-10 | End: 2023-04-10

## 2023-04-10 RX ORDER — INSULIN GLARGINE 100 [IU]/ML
15 INJECTION, SOLUTION SUBCUTANEOUS AT BEDTIME
Refills: 0 | Status: DISCONTINUED | OUTPATIENT
Start: 2023-04-10 | End: 2023-04-11

## 2023-04-10 RX ORDER — DEXTROSE 50 % IN WATER 50 %
15 SYRINGE (ML) INTRAVENOUS ONCE
Refills: 0 | Status: DISCONTINUED | OUTPATIENT
Start: 2023-04-10 | End: 2023-04-11

## 2023-04-10 RX ORDER — INSULIN LISPRO 100/ML
5 VIAL (ML) SUBCUTANEOUS
Refills: 0 | Status: DISCONTINUED | OUTPATIENT
Start: 2023-04-10 | End: 2023-04-11

## 2023-04-10 RX ADMIN — PANTOPRAZOLE SODIUM 40 MILLIGRAM(S): 20 TABLET, DELAYED RELEASE ORAL at 05:21

## 2023-04-10 RX ADMIN — CEFTRIAXONE 100 MILLIGRAM(S): 500 INJECTION, POWDER, FOR SOLUTION INTRAMUSCULAR; INTRAVENOUS at 08:19

## 2023-04-10 RX ADMIN — Medication 5 UNIT(S): at 11:53

## 2023-04-10 RX ADMIN — ALBUTEROL 2.5 MILLIGRAM(S): 90 AEROSOL, METERED ORAL at 13:45

## 2023-04-10 RX ADMIN — AZITHROMYCIN 255 MILLIGRAM(S): 500 TABLET, FILM COATED ORAL at 08:19

## 2023-04-10 RX ADMIN — ALBUTEROL 2.5 MILLIGRAM(S): 90 AEROSOL, METERED ORAL at 07:41

## 2023-04-10 RX ADMIN — Medication 10: at 16:56

## 2023-04-10 RX ADMIN — Medication 60 MILLIGRAM(S): at 05:22

## 2023-04-10 RX ADMIN — Medication 60 MILLIGRAM(S): at 11:53

## 2023-04-10 RX ADMIN — Medication 40 MILLIGRAM(S): at 05:22

## 2023-04-10 RX ADMIN — Medication 6: at 08:10

## 2023-04-10 RX ADMIN — INSULIN GLARGINE 15 UNIT(S): 100 INJECTION, SOLUTION SUBCUTANEOUS at 21:55

## 2023-04-10 RX ADMIN — ENOXAPARIN SODIUM 40 MILLIGRAM(S): 100 INJECTION SUBCUTANEOUS at 08:16

## 2023-04-10 RX ADMIN — Medication 8: at 11:53

## 2023-04-10 RX ADMIN — INSULIN GLARGINE 5 UNIT(S): 100 INJECTION, SOLUTION SUBCUTANEOUS at 11:53

## 2023-04-10 RX ADMIN — Medication 5 UNIT(S): at 16:57

## 2023-04-11 ENCOUNTER — TRANSCRIPTION ENCOUNTER (OUTPATIENT)
Age: 43
End: 2023-04-11

## 2023-04-11 VITALS
SYSTOLIC BLOOD PRESSURE: 156 MMHG | TEMPERATURE: 97 F | DIASTOLIC BLOOD PRESSURE: 82 MMHG | HEART RATE: 98 BPM | RESPIRATION RATE: 18 BRPM

## 2023-04-11 LAB
A1C WITH ESTIMATED AVERAGE GLUCOSE RESULT: 9.6 % — HIGH (ref 4–5.6)
ALBUMIN SERPL ELPH-MCNC: 4.2 G/DL — SIGNIFICANT CHANGE UP (ref 3.5–5.2)
ALP SERPL-CCNC: 97 U/L — SIGNIFICANT CHANGE UP (ref 30–115)
ALT FLD-CCNC: 24 U/L — SIGNIFICANT CHANGE UP (ref 0–41)
ANION GAP SERPL CALC-SCNC: 11 MMOL/L — SIGNIFICANT CHANGE UP (ref 7–14)
AST SERPL-CCNC: 18 U/L — SIGNIFICANT CHANGE UP (ref 0–41)
BASOPHILS # BLD AUTO: 0.03 K/UL — SIGNIFICANT CHANGE UP (ref 0–0.2)
BASOPHILS NFR BLD AUTO: 0.2 % — SIGNIFICANT CHANGE UP (ref 0–1)
BILIRUB SERPL-MCNC: 0.5 MG/DL — SIGNIFICANT CHANGE UP (ref 0.2–1.2)
BUN SERPL-MCNC: 17 MG/DL — SIGNIFICANT CHANGE UP (ref 10–20)
CALCIUM SERPL-MCNC: 8.8 MG/DL — SIGNIFICANT CHANGE UP (ref 8.4–10.5)
CHLORIDE SERPL-SCNC: 101 MMOL/L — SIGNIFICANT CHANGE UP (ref 98–110)
CO2 SERPL-SCNC: 23 MMOL/L — SIGNIFICANT CHANGE UP (ref 17–32)
CREAT SERPL-MCNC: 0.6 MG/DL — LOW (ref 0.7–1.5)
EGFR: 114 ML/MIN/1.73M2 — SIGNIFICANT CHANGE UP
EOSINOPHIL # BLD AUTO: 0.01 K/UL — SIGNIFICANT CHANGE UP (ref 0–0.7)
EOSINOPHIL NFR BLD AUTO: 0.1 % — SIGNIFICANT CHANGE UP (ref 0–8)
ESTIMATED AVERAGE GLUCOSE: 229 MG/DL — HIGH (ref 68–114)
GLUCOSE BLDC GLUCOMTR-MCNC: 176 MG/DL — HIGH (ref 70–99)
GLUCOSE BLDC GLUCOMTR-MCNC: 185 MG/DL — HIGH (ref 70–99)
GLUCOSE BLDC GLUCOMTR-MCNC: 249 MG/DL — HIGH (ref 70–99)
GLUCOSE SERPL-MCNC: 205 MG/DL — HIGH (ref 70–99)
HCT VFR BLD CALC: 39.4 % — SIGNIFICANT CHANGE UP (ref 37–47)
HGB BLD-MCNC: 12.8 G/DL — SIGNIFICANT CHANGE UP (ref 12–16)
IMM GRANULOCYTES NFR BLD AUTO: 0.4 % — HIGH (ref 0.1–0.3)
LYMPHOCYTES # BLD AUTO: 16.9 % — LOW (ref 20.5–51.1)
LYMPHOCYTES # BLD AUTO: 2.29 K/UL — SIGNIFICANT CHANGE UP (ref 1.2–3.4)
MAGNESIUM SERPL-MCNC: 2.3 MG/DL — SIGNIFICANT CHANGE UP (ref 1.8–2.4)
MCHC RBC-ENTMCNC: 26.6 PG — LOW (ref 27–31)
MCHC RBC-ENTMCNC: 32.5 G/DL — SIGNIFICANT CHANGE UP (ref 32–37)
MCV RBC AUTO: 81.9 FL — SIGNIFICANT CHANGE UP (ref 81–99)
MONOCYTES # BLD AUTO: 0.49 K/UL — SIGNIFICANT CHANGE UP (ref 0.1–0.6)
MONOCYTES NFR BLD AUTO: 3.6 % — SIGNIFICANT CHANGE UP (ref 1.7–9.3)
NEUTROPHILS # BLD AUTO: 10.66 K/UL — HIGH (ref 1.4–6.5)
NEUTROPHILS NFR BLD AUTO: 78.8 % — HIGH (ref 42.2–75.2)
NRBC # BLD: 0 /100 WBCS — SIGNIFICANT CHANGE UP (ref 0–0)
PHOSPHATE SERPL-MCNC: 2.5 MG/DL — SIGNIFICANT CHANGE UP (ref 2.1–4.9)
PLATELET # BLD AUTO: 226 K/UL — SIGNIFICANT CHANGE UP (ref 130–400)
PMV BLD: 11.7 FL — HIGH (ref 7.4–10.4)
POTASSIUM SERPL-MCNC: 4.1 MMOL/L — SIGNIFICANT CHANGE UP (ref 3.5–5)
POTASSIUM SERPL-SCNC: 4.1 MMOL/L — SIGNIFICANT CHANGE UP (ref 3.5–5)
PROT SERPL-MCNC: 7.2 G/DL — SIGNIFICANT CHANGE UP (ref 6–8)
RBC # BLD: 4.81 M/UL — SIGNIFICANT CHANGE UP (ref 4.2–5.4)
RBC # FLD: 15.3 % — HIGH (ref 11.5–14.5)
SODIUM SERPL-SCNC: 135 MMOL/L — SIGNIFICANT CHANGE UP (ref 135–146)
WBC # BLD: 13.53 K/UL — HIGH (ref 4.8–10.8)
WBC # FLD AUTO: 13.53 K/UL — HIGH (ref 4.8–10.8)

## 2023-04-11 PROCEDURE — 99239 HOSP IP/OBS DSCHRG MGMT >30: CPT

## 2023-04-11 RX ORDER — ALBUTEROL 90 UG/1
2 AEROSOL, METERED ORAL
Qty: 6 | Refills: 0
Start: 2023-04-11 | End: 2023-05-10

## 2023-04-11 RX ORDER — CEFPODOXIME PROXETIL 100 MG
1 TABLET ORAL
Qty: 8 | Refills: 0
Start: 2023-04-11 | End: 2023-04-14

## 2023-04-11 RX ORDER — NIFEDIPINE 30 MG
1 TABLET, EXTENDED RELEASE 24 HR ORAL
Qty: 90 | Refills: 0
Start: 2023-04-11 | End: 2023-07-09

## 2023-04-11 RX ORDER — AZITHROMYCIN 500 MG/1
1 TABLET, FILM COATED ORAL
Qty: 5 | Refills: 0
Start: 2023-04-11 | End: 2023-04-15

## 2023-04-11 RX ORDER — GLIMEPIRIDE 1 MG
1 TABLET ORAL
Qty: 60 | Refills: 0
Start: 2023-04-11 | End: 2023-06-09

## 2023-04-11 RX ORDER — METFORMIN HYDROCHLORIDE 850 MG/1
1 TABLET ORAL
Qty: 180 | Refills: 0
Start: 2023-04-11 | End: 2023-07-09

## 2023-04-11 RX ADMIN — Medication 5 UNIT(S): at 11:25

## 2023-04-11 RX ADMIN — Medication 60 MILLIGRAM(S): at 05:10

## 2023-04-11 RX ADMIN — Medication 2: at 08:09

## 2023-04-11 RX ADMIN — Medication 5 UNIT(S): at 08:09

## 2023-04-11 RX ADMIN — AZITHROMYCIN 255 MILLIGRAM(S): 500 TABLET, FILM COATED ORAL at 11:15

## 2023-04-11 RX ADMIN — ENOXAPARIN SODIUM 40 MILLIGRAM(S): 100 INJECTION SUBCUTANEOUS at 08:14

## 2023-04-11 RX ADMIN — Medication 50 MILLIGRAM(S): at 05:09

## 2023-04-11 RX ADMIN — CEFTRIAXONE 100 MILLIGRAM(S): 500 INJECTION, POWDER, FOR SOLUTION INTRAMUSCULAR; INTRAVENOUS at 08:14

## 2023-04-11 RX ADMIN — PANTOPRAZOLE SODIUM 40 MILLIGRAM(S): 20 TABLET, DELAYED RELEASE ORAL at 05:10

## 2023-04-11 RX ADMIN — Medication 4: at 11:25

## 2023-04-11 NOTE — DISCHARGE NOTE PROVIDER - HOSPITAL COURSE
HPI:    43-year-old female with past medical history of HTN, asthma who presents for shortness of breath and cough for 1 day.  Patient stated that she woke up 1 day PTP due to shortness of breath and cough. Patient recently traveled from [place] 1 day PTP.  States she is unable to take a full breath, and has a new cough with greenish sputum production PTP.  Patient is also endorsed sore throat and chills, denies any fever. Patient denies chest pain or palpitation. Patient denied any abdominal pain, n/v/d/c. Patient denied any sick contact. Patient denies any hx of alcohol use, tobacco use or any vaping hx. Patient had last asthma exacerbation 1 year ago, no hx of ICU admission or intubation. Patient was saturating 92% on RA. Patient is vaccine against COVID 19 X2. Upon arrival to the ED patient was placed on nonrebreather mask 15l and was saturating 100%. S/P Ceftriaxone and Flagyl in the ED.     ED vitals   T(F): 98.6 (04-08-23 @ 07:44), Max: 98.6 (04-08-23 @ 07:44)  HR: 81 (04-08-23 @ 07:44) (81 - 88)  BP: 190/99 (04-08-23 @ 07:44) (110/82 - 190/99)  RR: 20 (04-08-23 @ 07:44) (18 - 22)  SpO2: 98% (04-08-23 @ 07:44) (98% - 100%) on 2L NC     Labs significant for Hb 12.7, HCT 39.3, WBC 10.21, , TPro  7.0  /  Alb  4.0  /  TBili  0.3  /  DBili  x   /  AST  20  /  ALT  28  /  AlkPhos  127<H>  04-08    139  |  104  |  13  ----------------------------<  262<H>  3.5   |  24  |  0.7    Ca    8.9      08 Apr 2023 03:05    CTA chest : neg for PE. Right lung areas of ground glass attenuation, suggestive of atypical pneumonia     SOB 2/2 intermittent asthma exacerbation 2/2 sm RUL Atypical PNA  given: rocephin and azith - will finish 7 days w/ vantin and azith  steroids: Pred 60mg po q24 and taper by 10mg q24 til off  nebs for now  pt not usual on inhalers and exac are < 1/yr  does NOT need ICS or LABA; just rescue albuterol or combivent   urine legionella and streptococcus neg  tessalon for cough    # HTN  c/w nifed xl 60 q24  c/w DASH diet     # severe hyperglycemia - likely undiagnosed DM; certainly worse w/ steroids  a1c 9.7  diabetic diet  FS qac/hs - goal 100-180  lantus 15 hS  lisp 5/m w/ +1 scale  oral meds on d/c: dc on metformin 500mg po q12 + amaryl 2mg po qam  outpt [facility]     Leukocytosis due to steroid use - clinically better     DVT ppx: LMWH        Patient was seen and examined this morning at bedside and is stable for discharge.  He is to be discharged on the prescribed medications  He is to follow-up with his PCP

## 2023-04-11 NOTE — DISCHARGE NOTE NURSING/CASE MANAGEMENT/SOCIAL WORK - PATIENT PORTAL LINK FT
You can access the FollowMyHealth Patient Portal offered by [facility] by registering at the following website: [email or web address]. By joining [facility]’s FollowMyHealth portal, you will also be able to view your health information using other applications (apps) compatible with our system.

## 2023-04-11 NOTE — PROGRESS NOTE ADULT - ASSESSMENT
44 yo F w/ PMH of HTN and asthma presented w/ SOB and cough x 1 day. CTA neg for PE but showed R lung ground glass attenuation, suggestive of atypical pneumonia. Currently being treated for asthma exacerbation secondary to atypical PNA.    # SOB 2/2 intermittent asthma exacerbation 2/2 sm RUL Atypical PNA  abx: on rocephin and azith - will finish 7 days w/ vantin and azith  steroids: Pred 60mg po q24 and taper by 10mg q24 til off  nebs for now  pt not usual on inhalers and exac are < 1/yr  does NOT need ICS or LABA; just rescue albuterol or combivent - can d/c symbicort  f/u urine legionella and streptococcus ag  tessalon for cough    # HTN  c/w nifed xl 60 q24  c/w DASH diet     # severe hyperglycemia - likely undiagnosed DM; certainly worse w/ steroids  a1c 9.7  diabetic diet  FS qac/hs - goal 100-180  lantus 15 hS  lisp 5/m w/ +1 scale  oral meds on d/c: prob metformin 500mg po q12 + amaryl 2mg po qam  outpt refer [facility]    # Leukocytosis due to steroid use - clinically better    # DVT ppx: LMWH    # GI ppx: PPI po    # Activity: independent    # Dispo: abx - oral tommy; taper pred; fix DM; tx BP;   eventually, pt will go home w/ no needs - anticipate tommy
44 yo F w/ PMH of HTN and asthma presented w/ SOB and cough x 1 day. CTA neg for PE but showed R lung ground glass attenuation, suggestive of atypical pneumonia. Currently being treated for asthma exacerbation secondary to atypical PNA.    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 - spoke to her how cold can trigger asthma  does NOT need ICS or LABA; just rescue albuterol or combivent - can d/c symbicort  f/u urine legionella and streptococcus ag  tessalon for cough    # HTN  c/w nifed xl 60 q24  c/w DASH diet     # severe hyperglycemia - likely undiagnosed DM; certainly worse w/ steroids  a1c 9.7  diabetic diet  FS qac/hs - goal 100-180  lantus 15 hS  lisp 5/m w/ +1 scale  oral meds on d/c: metformin 500mg po q12 + amaryl 2mg po qam  outpt refer [facility]    # Leukocytosis due to steroid use - clinically better    # DVT ppx: LMWH    # GI ppx: PPI po    # Activity: independent    # Pharm: [facility]     # Dispo: abx - oral; taper pred; fix DM; tx BP;   pt will go home w/ no needs - today  give MAP appt for IM f/u
42 yo F w/ PMH of HTN and asthma presented w/ SOB and cough x 1 day. CTA neg for PE but showed R lung ground glass attenuation, suggestive of atypical pneumonia. Currently being treated for asthma exacerbation secondary to atypical PNA.    # SOB r/o Atypical PNA vs bronchitis, asthma exacerbation  - c/w abx, steroids, nebs for now  - no wheezing on my PE and SOB improved; started on prednisone taper 60 > 50>40>30>20>10 over 6 days   - f/u urine legionella and streptococcus ag    # HTN, uncontrolled  - cont meds and increase if needed  - c/w DASH diet     # hyperglycemia in bmp likely undiagnosed DM  - a1c 9.7, monitor fingersticks,   - started on lantus 15 and lispro 5 TID     # Leukocytosis due to steroid use and possibly from infection, increased today to 18 K, neutrophilic predominance     # Disposition: anticipate d/c in 24-48 hrs    
44 yo F w/ PMH of HTN and asthma presented w/ SOB and cough x 1 day. CTA neg for PE but showed R lung ground glass attenuation, suggestive of atypical pneumonia. Currently being treated for asthma exacerbation secondary to atypical PNA.    # SOB r/o Atypical PNA vs bronchitis, asthma exacerbation  - c/w abx, steroids, nebs for now  - no wheezing on my PE and SOB improved; consider tapering prednisone tomorrow  - f/u urine legionella and streptococcus ag    # HTN, uncontrolled  - cont meds and increase if needed  - c/w DASH diet     # hyperglycemia in bmp likely undiagnosed DM  - check a1c, monitor fingersticks,     # Leukocytosis due to steroid use and possibly from infection, monitor for now    # Disposition: anticipate d/c in 24-48 hrs    Rosmery Vyas   Covering hospitalist  I can be reached directly on MS Teams. My personal cell number is [phone].

## 2023-04-11 NOTE — DISCHARGE NOTE PROVIDER - PROVIDER TOKENS
PROVIDER:[TOKEN:[[id number]],FOLLOWUP:[1 week]] PROVIDER:[TOKEN:[[id number]],FOLLOWUP:[1 week]],PROVIDER:[TOKEN:[[id number]],FOLLOWUP:[1 week]]

## 2023-04-11 NOTE — DISCHARGE NOTE PROVIDER - CARE PROVIDER_API CALL
Susanna Dominique)  Internal Medicine  [address] [place] [address], [address] [place] [address]  Phone: [phone]  Fax: [phone]  Follow Up Time: 1 week   Susanna Dominique)  Internal Medicine  [address] [place] [address], [address]st [place] [address]  Phone: [phone]  Fax: [phone]  Follow Up Time: 1 week    Arthur Kurtz)  Internal Medicine  [address] [place] [address], Admin - [address]  [place] [address]  Phone: [phone]  Fax: [phone]  Follow Up Time: 1 week

## 2023-04-11 NOTE — DISCHARGE NOTE PROVIDER - NSDCCPCAREPLAN_GEN_ALL_CORE_FT
PRINCIPAL DISCHARGE DIAGNOSIS  Diagnosis: Atypical pneumonia  Assessment and Plan of Treatment: You came to the hospital for shortness of breath. you had pneumonia. you were treted with antibtiotics.   Please take your medications as directed. Don’t skip doses. Follow up with your primary care physician within 3 days. Continue taking your antibiotics as directed until they are all gone—even if you start to feel better. This will prevent the pneumonia from reoccuring. Coughing up mucus is normal. Don’t use medicines to suppress your cough unless your cough is dry, painful, or interferes with your sleep. Get plenty of rest until your fever, shortness of breath, and chest pain go away. Plan to get a flu shot every year. Ask your primary care doctor about pneumonia vaccines.  Seek immediate medical attention if you experience chest pain, trouble breathing, blue lips or fingernails, fever of 100.4°F  (38°C) or higher, yellow, green, bloody, or smelly sputum, more than normal mucus production, vomiting or diarrhea.

## 2023-04-11 NOTE — DISCHARGE NOTE PROVIDER - NSDCMRMEDTOKEN_GEN_ALL_CORE_FT
Albuterol (Eqv-Proventil HFA) 90 mcg/inh inhalation aerosol: 2 puff(s) inhaled 3 times a day as needed for  shortness of breath and/or wheezing  Amaryl 2 mg oral tablet: 1 tab(s) orally once a day  azithromycin 500 mg oral tablet: 1 tab(s) orally once a day  cefpodoxime 200 mg oral tablet: 1 tab(s) orally 2 times a day  MetFORMIN (Eqv-Fortamet) 500 mg oral tablet, extended release: 1 tab(s) orally 2 times a day  NIFEdipine 60 mg oral tablet, extended release: 1 tab(s) orally once a day  predniSONE 10 mg oral tablet: 10 tab(s) orally 40 mg for 1 day  30 mg for 1 day  20 mg for 1 day  10 mg for 1 day   Albuterol (Eqv-Proventil HFA) 90 mcg/inh inhalation aerosol: 2 puff(s) inhaled 3 times a day as needed for  shortness of breath and/or wheezing  Amaryl 2 mg oral tablet: 1 tab(s) orally once a day  azithromycin 500 mg oral tablet: 1 tab(s) orally once a day  cefpodoxime 200 mg oral tablet: 1 tab(s) orally 2 times a day  MetFORMIN (Eqv-Fortamet) 500 mg oral tablet, extended release: 1 tab(s) orally 2 times a day  NIFEdipine 60 mg oral tablet, extended release: 1 tab(s) orally once a day  predniSONE 10 mg oral tablet: 4 tab(s) orally once a day  predniSONE 10 mg oral tablet: 3 tab(s) orally once a day  predniSONE 10 mg oral tablet: 2 tab(s) orally once a day  predniSONE 10 mg oral tablet: 1 tab(s) orally once a day

## 2023-04-11 NOTE — DISCHARGE NOTE NURSING/CASE MANAGEMENT/SOCIAL WORK - NSDCPEFALRISK_GEN_ALL_CORE
For information on Fall & Injury Prevention, visit: [email or web address] OR  [email or web address] OR  https://www.cdc.gov/steadi/patient.html

## 2023-04-11 NOTE — DISCHARGE NOTE PROVIDER - CARE PROVIDERS DIRECT ADDRESSES
,[email or web address]

## 2023-04-11 NOTE — DISCHARGE NOTE PROVIDER - NSDCFUADDAPPT_GEN_ALL_CORE_FT
Please followup with you primary care doctor in 2 weeks and update on the hospitalization and the recent events. Go over with your primary care doctor over the medications you were discharged on

## 2023-04-14 DIAGNOSIS — E66.9 OBESITY, UNSPECIFIED: ICD-10-CM

## 2023-04-14 DIAGNOSIS — J18.9 PNEUMONIA, UNSPECIFIED ORGANISM: ICD-10-CM

## 2023-04-14 DIAGNOSIS — E11.65 TYPE 2 DIABETES MELLITUS WITH HYPERGLYCEMIA: ICD-10-CM

## 2023-04-14 DIAGNOSIS — J45.901 UNSPECIFIED ASTHMA WITH (ACUTE) EXACERBATION: ICD-10-CM

## 2023-04-14 DIAGNOSIS — J96.01 ACUTE RESPIRATORY FAILURE WITH HYPOXIA: ICD-10-CM

## 2023-04-14 DIAGNOSIS — Z48.1 ENCOUNTER FOR PLANNED POSTPROCEDURAL WOUND CLOSURE: ICD-10-CM

## 2023-04-14 DIAGNOSIS — I10 ESSENTIAL (PRIMARY) HYPERTENSION: ICD-10-CM

## 2023-07-05 NOTE — PATIENT PROFILE ADULT - NSPROPOAURINARYCATHETER_GEN_A_NUR
Greta Sanders COT    
no

## 2023-11-21 NOTE — OB RN DELIVERY SUMMARY - NS_GENERALBABYACOMMENTA_OBGYN_ALL_OB_FT
REFILL: Rosuvastatin (prescribed by Dr. Summer German)   Phenergan (Dr. Phyllis Robles) Report given to JOSE MANUEL Vee in high risk nursery

## 2024-05-02 NOTE — ED PROVIDER NOTE - IV ALTEPLASE EXCL REL HIDDEN
Pt clear for DC from case management standpoint. Discharging to home.       05/02/24 1353   Final Note   Assessment Type Final Discharge Note   Anticipated Discharge Disposition Home        show

## 2024-06-10 NOTE — OB PROVIDER H&P - NSHPSOCIALHISTORY_GEN_ALL_CORE
Hand Surgery Clinic Follow Up Note    Chief Complaint  Chief Complaint   Patient presents with    Right Elbow - Follow-up, Pain       History of Present Illness  51-year-old right-hand dominant female presents for follow up.  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  She was seen by me in April of 2024.  She was found to have a partial tear of the common extensor tendon as well as a partial tear to the lateral collateral ligament.  She has been working with therapy but has been having some pain.  She was recently prescribed diclofenac by Dr. Srinivasan within the last week which has significantly helped with her pain control issues.  She thinks that she will now be able to participate better with therapy now that she was taking this medication.  She has been taking it with food.  She was no numbness or tingling.  The pain and symptoms she is experiencing are the same as her last visit.    Review of Systems  Review of systems negative for chest pain, shortness of breath, fevers, chills, nausea/vomiting.    Vital Signs  There were no vitals filed for this visit.    Physical Exam  Constitutional: Appears well-developed and well-nourished. No distress.   HENT:   Head: Normocephalic.   Eyes: EOM are normal.   Pulmonary/Chest: Effort normal.   Neurological: Oriented to person, place, and time.   Psychiatric: Normal mood and affect.     Right Upper Extremity:  No abrasions, lacerations, wounds.  No swelling.  No erythema.  No drainage.  Patient is able to make a fist and extend all her fingers.  Patient has full active elbow flexion and extension.  Full pronation and supination.  No pain or mechanical symptoms with chair rise test.  No subluxation/instability with lateral pivot shift test or posterior drawer test.  Negative apprehension test.  Patient has tenderness over the lateral epicondyle as well as over the radiocapitellar articulation.  Patient has pain with resisted wrist extension.  No tenderness  over the medial epicondyle.  No pain with resisted wrist flexion.  Sensation is intact in the median, radial, ulnar nerve distributions.  Palpable radial pulse.    Imaging  No new imaging obtained today.    Assessment and Plan  51-year-old right-hand dominant female presents for follow up.  She sustained a right elbow injury 3 months ago resulting in a partial tear to the common extensor tendon as well as a partial tear to the lateral collateral ligament.  She has been working with therapy.  She has been taking diclofenac as needed for pain control.  She started the diclofenac within the last week and thinks that she will be able to do better with therapy now that her pain is much better controlled.  She is not having any instability symptoms.  I discussed that I would have expected her symptoms be a little bit better at this point unfortunately.  I would like for her to continue therapy.  She is going to follow up in 5 weeks for re-evaluation.  Discussed that I may obtain stress x-rays if needed if she is not doing better at that visit to determine if we may want to consider surgical intervention as an option.    Jeannie Cárdenas MD  Orthopaedic Hand Surgery       Denies tobacco, etoh, illicit use

## 2024-12-11 NOTE — ED ADULT TRIAGE NOTE - NS ED NURSE BANDS TYPE
Name band;
Procedure To Be Performed At Next Visit: Skin Tag removal
Detail Level: Simple
Size Of Lesion In Cm (Optional): 0
Introduction Text (Please End With A Colon): The following procedure was deferred:
Instructions (Optional): SKs and skin tag removal
Keshia London)  Pediatrics  [address] [place] [address]  Phone: [phone]  Fax: [phone]  Follow Up Time: